# Patient Record
Sex: FEMALE | Race: WHITE | Employment: OTHER | URBAN - METROPOLITAN AREA
[De-identification: names, ages, dates, MRNs, and addresses within clinical notes are randomized per-mention and may not be internally consistent; named-entity substitution may affect disease eponyms.]

---

## 2017-06-23 PROBLEM — I89.0 LYMPHEDEMA OF BOTH LOWER EXTREMITIES: Status: ACTIVE | Noted: 2017-06-23

## 2017-07-10 ENCOUNTER — HOSPITAL ENCOUNTER (OUTPATIENT)
Dept: INFUSION THERAPY | Age: 82
Discharge: HOME | End: 2017-07-10
Attending: FAMILY MEDICINE | Admitting: FAMILY MEDICINE

## 2017-07-10 VITALS
WEIGHT: 193 LBS | HEART RATE: 68 BPM | HEIGHT: 65 IN | OXYGEN SATURATION: 97 % | DIASTOLIC BLOOD PRESSURE: 59 MMHG | RESPIRATION RATE: 18 BRPM | SYSTOLIC BLOOD PRESSURE: 140 MMHG | BODY MASS INDEX: 32.15 KG/M2 | TEMPERATURE: 98.5 F

## 2017-07-10 RX ORDER — ZOLEDRONIC ACID 5 MG/100ML
5 INJECTION, SOLUTION INTRAVENOUS ONCE
Status: COMPLETED | OUTPATIENT
Start: 2017-07-10 | End: 2017-07-10

## 2017-07-10 RX ORDER — SODIUM CHLORIDE 0.9 % (FLUSH) 0.9 %
10 SYRINGE (ML) INJECTION PRN
Status: DISCONTINUED | OUTPATIENT
Start: 2017-07-10 | End: 2017-07-11 | Stop reason: HOSPADM

## 2017-07-10 RX ADMIN — ZOLEDRONIC ACID 5 MG: 5 INJECTION, SOLUTION INTRAVENOUS at 13:30

## 2017-10-03 ENCOUNTER — HOSPITAL ENCOUNTER (OUTPATIENT)
Dept: WOMENS IMAGING | Age: 82
Discharge: HOME | End: 2017-10-03
Attending: FAMILY MEDICINE | Admitting: FAMILY MEDICINE

## 2017-10-03 DIAGNOSIS — M81.0 AGE-RELATED OSTEOPOROSIS WITHOUT CURRENT PATHOLOGICAL FRACTURE: ICD-10-CM

## 2017-10-03 DIAGNOSIS — M81.0 OSTEOPOROSIS, UNSPECIFIED OSTEOPOROSIS TYPE, UNSPECIFIED PATHOLOGICAL FRACTURE PRESENCE: ICD-10-CM

## 2017-10-03 DIAGNOSIS — Z12.31 SCREENING MAMMOGRAM, ENCOUNTER FOR: ICD-10-CM

## 2017-12-22 ENCOUNTER — HOSPITAL ENCOUNTER (OUTPATIENT)
Dept: PHYSICAL THERAPY | Age: 82
Discharge: HOME | End: 2017-12-31
Attending: PHYSICAL MEDICINE & REHABILITATION | Admitting: FAMILY MEDICINE

## 2017-12-22 NOTE — FLOWSHEET NOTE
Physical Therapy Daily Treatment Note  Date:  2017    Patient Name:  Jose Martin Diamond    :  1931  MRN: [id number]  Restrictions/Precautions:  none  Diagnosis: imbalance      Insurance/Certification information:  [facility] Medicare Aetna  Next Physician Visit:  Referring Physician:  Dr. Dina Nazario  Visit# / total visits:1  / 10                 Initial Pain level: 4 to 5/10 Back and left foot if she stands too long      Subjective:      Clinical Presentation: evolving:  Pt presents with decreased balance and unsteady gait that has resulted in several falls with fxs of her hips and ankle/ foot . Her malalignment of her left foot fracture greatly impairs her steadiness and gait. Bone is protruding from her foot. Custom shoe is uncomfortable. She also has sensory loss of her feet. Today she started with strengthening to her LE mm and balance exs. Any changes to Ambulatory Summary Sheet? Goals:     Long term goals  Time Frame for Long term goals : 60 days  Long term goal 1: no falls  Long term goal 2: indep with home program  Long term goal 3: improve steadiness of gait by 20%  Patient's goal:  Improve balance and no falls  Skilled PT activities: Date 17 Date Date Date     Outcome measure used          On visit#         Hip abd sitting  BLACK tband 2x10        LAQ        standing ankle sways front to back and side to side  10x, each , easily falls backwards, and substitutes by flexing her knees        Punches.  Reaching          Check custom shoes when she brings it in         Balance exs in bars                 asked to eat more healthy, ie protein breakfast and bedtime snack as she has episodes of lightheadedness advised        Nu step   Seat/arms 9  Level 2  6 min                                             Progress/goals assessment (every 10 visits) by  PT           HEP: Above exs      Objective   findings: See presentation      Provider interaction:        Response to

## 2018-01-01 ENCOUNTER — HOSPITAL ENCOUNTER (OUTPATIENT)
Dept: OTHER | Age: 83
Discharge: HOME | End: 2018-01-31
Attending: FAMILY MEDICINE | Admitting: FAMILY MEDICINE

## 2018-01-09 ENCOUNTER — HOSPITAL ENCOUNTER (OUTPATIENT)
Dept: PHYSICAL THERAPY | Age: 83
Discharge: HOME | End: 2018-01-09
Attending: PHYSICAL MEDICINE & REHABILITATION | Admitting: FAMILY MEDICINE

## 2018-01-09 NOTE — FLOWSHEET NOTE
Physical Therapy Daily Treatment Note  Date:  2018    Patient Name:  Mili Ornelas    :  1931  MRN: [id number]  Restrictions/Precautions:  none        Insurance/Certification information:  [facility] Medicare Aetna  Next Physician Visit:  Referring Physician:  Dr. Jinny Cordova  Visit# / total visits:  2  / 10                 Initial Pain level: 0/10     Subjective: Pt states the pain is not really her problem but her balance is. Clinical Presentation: evolving:  Pt presents with decreased balance and unsteady gait that has resulted in several falls with fxs of her hips and ankle/ foot . Her malalignment of her left foot fracture greatly impairs her steadiness and gait. Bone is protruding from her foot. Custom shoe is uncomfortable. She also has sensory loss of her feet. Today she started with strengthening to her LE mm and balance exs. Any changes to Ambulatory Summary Sheet? Goals:     Long term goals  Time Frame for Long term goals : 60 days  Long term goal 1: no falls  Long term goal 2: indep with home program  Long term goal 3: improve steadiness of gait by 20%  Patient's goal:  Improve balance and no falls  Skilled PT activities: Date 17 Date 18 #2 Date Date     Outcome measure used          On visit#         Hip abd sitting  BLACK tband 2x10 BLACK tband x 30 w /3 ct       LAQ  5 ct 2 x 10      standing ankle sways front to back and side to side  10x, each , easily falls backwards, and substitutes by flexing her knees X 10 with posterior LOB and compensation of flexing the knees while standing on blue foam       Punches.  Reaching   2 x 10 ea of cross punches at various levels and ball reach at several levels while standing on blue foam       Check custom shoes when she brings it in  Pt wearing custom stable shoes today       Balance exs in bars                 asked to eat more healthy, ie protein breakfast and bedtime snack as she has episodes of lightheadedness advised        Nu step   Seat/arms 9  Level 2  6 min Seat/arms 9  Level 2  12 min                                            Progress/goals assessment (every 10 visits) by  PT           HEP: Above exs New addition above with HO given     Objective   findings: See presentation Pt with several episodes of posterior LOB.   Some LOB to the R but nothing like posterior     Provider interaction:   Verbal and manual cueing on proper performance of the prescribed exercise or of the designated task     Response to intervention:   comfrtable, No pain     Plan for Next Session: Advance exs to her tolerance Advance exs to her tolerance       Modality/intervention used:  [x] Therapeutic Exercise  [] Modalities:  [] Therapeutic Activity     [] Ultrasound  [] Elec  Stim  [] Gait Training      [] Cervical Traction [] Lumbar Traction  [x] Neuromuscular Re-education    [] Cold/hotpack [] Iontophoresis   [x] Instruction in HEP      [] Vasopneumatic     [] Manual Therapy               [] Self care home management                    (    ) Dry needling    Post Tx Pain Ratin/10    Plan:(Fequency/duration/# of visits) 1 to 2 x a week for 10 visits  [x] Continue per plan of care [] Alter current plan   [] Plan of care initiated [] Hold pending MD visit [] Discharge         Time In: 3605  Time Out:1111  Timed Code Treatment Minutes: 46  Total Treatment Minutes:  46    Electronically signed by:  Blake Suárez 2018, 10:36 AM

## 2018-01-11 ENCOUNTER — HOSPITAL ENCOUNTER (OUTPATIENT)
Dept: PHYSICAL THERAPY | Age: 83
Discharge: HOME | End: 2018-01-11
Attending: PHYSICAL MEDICINE & REHABILITATION | Admitting: FAMILY MEDICINE

## 2018-01-11 NOTE — FLOWSHEET NOTE
Physical Therapy Daily Treatment Note  Date:  2018    Patient Name:  Clarissa Tipton    :  1931  MRN: [id number]  Restrictions/Precautions:  none        Insurance/Certification information:  [facility] Medicare Aetna  Next Physician Visit:  Referring Physician:  Dr. Kami Ching  Visit# / total visits: 3  / 10                 Initial Pain level: 0/10  Low back hurts     Subjective: Pt states  She has  More swelling with her legs and uses home use home massager--   Clinical Presentation: evolving:  Pt presents with decreased balance and unsteady gait that has resulted in several falls with fxs of her hips and ankle/ foot . Her malalignment of her left foot fracture greatly impairs her steadiness and gait. Bone is protruding from her foot. Custom shoe is uncomfortable. She also has sensory loss of her feet. Today she started with strengthening to her LE mm and balance exs. Any changes to Ambulatory Summary Sheet? Goals:     Long term goals  Time Frame for Long term goals : 60 days  Long term goal 1: no falls  Long term goal 2: indep with home program  Long term goal 3: improve steadiness of gait by 20%  Patient's goal:  Improve balance and no falls         Prefers the name : Gladis Jenkins PT activities: Date 17 Date 18 #2 Date  18 #3 Date     Outcome measure used          On visit#         Hip abd sitting  BLACK tband 2x10 BLACK tband x 30 w /3 ct Black 30x      LAQ  5 ct 2 x 10      standing ankle sways front to back and side to side  10x, each , easily falls backwards, and substitutes by flexing her knees X 10 with posterior LOB and compensation of flexing the knees while standing on blue foam Side to side 10x    Front to back 10x      Punches.  Reaching   2 x 10 ea of cross punches at various levels and ball reach at several levels while standing on blue foam Reach forward 5x , loses balance     Punches 20x loses balance 7 x       Check custom shoes when she

## 2018-01-18 ENCOUNTER — HOSPITAL ENCOUNTER (OUTPATIENT)
Dept: PHYSICAL THERAPY | Age: 83
Discharge: HOME | End: 2018-01-18
Attending: PHYSICAL MEDICINE & REHABILITATION | Admitting: FAMILY MEDICINE

## 2018-01-23 ENCOUNTER — HOSPITAL ENCOUNTER (OUTPATIENT)
Dept: PHYSICAL THERAPY | Age: 83
Discharge: HOME | End: 2018-01-23
Attending: PHYSICAL MEDICINE & REHABILITATION | Admitting: FAMILY MEDICINE

## 2018-01-23 NOTE — FLOWSHEET NOTE
Reach forward 5x , loses balance     Punches 20x loses balance 7 x  Reach forward 5x , loses balance     Punches 20x    Reach forward touching ball 5x , loses balance     Punches 20x     Check custom shoes when she brings it in  Pt wearing custom stable shoes today Wearing custom shoes, good shoes,  But pt does not like then        Balance exs in bars        Arm raises standing    10 x , easily loses balance 3 x.  x 10 reps Holding ball, arm raises x 10 reps, cw and ccw x 10 reps each   Sitting heel raises    10x sitting, unable to do this standing  10x sitting, unable to do this standing  x 10 reps sitting    toe raises holding on to bar    10x holding on bar 10x holding on bar 10x holding on bar    asked to eat more healthy, ie protein breakfast and bedtime snack as she has episodes of lightheadedness advised  No lightheadness since was medication was changed        Nu step   Seat/arms 9  Level 2  6 min Seat/arms 9  Level 2  12 min Seat/ 10  Arms 11   level 3   20  min Seat/ 10  Arms 11   level 3   15  min Seat/ 10  Arms 11   level 3   15  min          Sidestepping in parallel without UE support x 2 laps          Ball activities in parallel bars, standing tossing R<>L hand, bouncing ball R<>L, intermittent LOB. Attempted tossing ball up with 1 clap - pt unable Ball activities in parallel bars, standing tossing R<>L hand, bouncing ball, toss/catching with 1 clap and combo of bouncing and then toss with 1 clap. Progress/goals assessment (every 10 visits) by  PT            HEP: Above exs New addition above with HO given Added 4 new exs and advised pt to hold on or be close to counter so she can regain her balance. Continue with HEP as instructed continue   Objective   findings: See presentation Pt with several episodes of posterior LOB.   Some LOB to the R but nothing like posterior Increased swelling of legs, and advised to lie down 20  Min with legs elevated    Intermittent LOB with balance activities Pt loses balance easily posteriorly   Provider interaction:   Verbal and manual cueing on proper performance of the prescribed exercise or of the designated task Teaching, cues and close guard for balance exs  Monitoring to ensure safe and effective activity performance Monitoring to ensure safe and effective activity performance   Response to intervention:   comfrtable, No pain  No pain, min fatigue No pain, min fatigue   Plan for Next Session: Advance exs to her tolerance Advance exs to her tolerance Advance exs  Cont. Progress as tolerated Cont.  Progress as tolerated     Modality/intervention used:  [x] Therapeutic Exercise  [] Modalities:  [] Therapeutic Activity     [] Ultrasound  [] Elec  Stim  [] Gait Training      [] Cervical Traction [] Lumbar Traction  [x] Neuromuscular Re-education    [] Cold/hotpack [] Iontophoresis   [x] Instruction in HEP      [] Vasopneumatic     [] Manual Therapy               [] Self care home management                    (    ) Dry needling    Post Tx Pain Ratin/10    Plan:(Fequency/duration/# of visits) 1 to 2 x a week for 10 visits  [x] Continue per plan of care [] Alter current plan   [] Plan of care initiated [] Hold pending MD visit [] Discharge         Time In: 6422  Time Out:1200  Timed Code Treatment Minutes:  45  Total Treatment Minutes:  45    Electronically signed by:  Val Pinto 2018, 11:15 AM

## 2018-01-25 ENCOUNTER — HOSPITAL ENCOUNTER (OUTPATIENT)
Dept: PHYSICAL THERAPY | Age: 83
Discharge: HOME | End: 2018-01-25
Attending: PHYSICAL MEDICINE & REHABILITATION | Admitting: FAMILY MEDICINE

## 2018-01-25 NOTE — FLOWSHEET NOTE
Physical Therapy Daily Treatment Note  Date:  2018    Patient Name:  Rut Arzola by Violeta Yee    :  1931  MRN: [id number]  Restrictions/Precautions:  none        Insurance/Certification information:  [facility] Medicare Aetna  Next Physician Visit:  Referring Physician:  Dr. Ree Motley  Visit# / total visits: 6/ 10                 Initial Pain level: 0/10  Low back     Subjective: Pt states she is tired today. Clinical Presentation: evolving:  Pt presents with decreased balance and unsteady gait that has resulted in several falls with fxs of her hips and ankle/ foot . Her malalignment of her left foot fracture greatly impairs her steadiness and gait. Bone is protruding from her foot. Custom shoe is uncomfortable. She also has sensory loss of her feet. Today she started with strengthening to her LE mm and balance exs. Any changes to Ambulatory Summary Sheet?              Goals:     Long term goals  Time Frame for Long term goals : 60 days  Long term goal 1: no falls  Long term goal 2: indep with home program  Long term goal 3: improve steadiness of gait by 20%  Patient's goal:  Improve balance and no falls         Prefers the name : Bessie Rivera PT activities: Date 17 Date 18 #2 Date  18 #3 Date 18 (4) Date 18 (5) 18 #6     Outcome measure used          On visit#      No foot sensation and old left ankle fx     Hip abd sitting  BLACK tband 2x10 BLACK tband x 30 w /3 ct Black 30x Black 30x Black 30x 30x black     LAQ  5 ct 2 x 10  2x10 reps 2x10 R/L     standing ankle sways front to back and side to side  10x, each , easily falls backwards, and substitutes by flexing her knees X 10 with posterior LOB and compensation of flexing the knees while standing on blue foam Side to side 10x    Front to back 10x Side to side 10x    Front to back 10x Side to side 10x    Front to back 10x 10x eyes open   10x eyes closed with tactile cues  For both motions, very then toss with 1 clap. Ball tossing sitting 20x  Ball tossing   Standing 20x    Bouncing to PT while standing in bars. Did well 20x                            Progress/goals assessment (every 10 visits) by  PT             HEP: Above exs New addition above with HO given Added 4 new exs and advised pt to hold on or be close to counter so she can regain her balance. Continue with HEP as instructed continue Continue    Objective   findings: See presentation Pt with several episodes of posterior LOB. Some LOB to the R but nothing like posterior Increased swelling of legs, and advised to lie down 20  Min with legs elevated    Intermittent LOB with balance activities Pt loses balance easily posteriorly Pt has improved with doing more difficult balance exs    Provider interaction:   Verbal and manual cueing on proper performance of the prescribed exercise or of the designated task Teaching, cues and close guard for balance exs  Monitoring to ensure safe and effective activity performance Monitoring to ensure safe and effective activity performance Close guard for safety , and needs many cues   Response to intervention:   comfrtable, No pain  No pain, min fatigue No pain, min fatigue No pain min fatigue   Plan for Next Session: Advance exs to her tolerance Advance exs to her tolerance Advance exs  Cont. Progress as tolerated Cont.  Progress as tolerated Continue to progress     Modality/intervention used:  [x] Therapeutic Exercise  [] Modalities:  [] Therapeutic Activity     [] Ultrasound  [] Elec  Stim  [] Gait Training      [] Cervical Traction [] Lumbar Traction  [x] Neuromuscular Re-education    [] Cold/hotpack [] Iontophoresis   [x] Instruction in HEP      [] Vasopneumatic     [] Manual Therapy               [] Self care home management                    (    ) Dry needling    Post Tx Pain Ratin/10    Plan:(Fequency/duration/# of visits) 1 to 2 x a week for 10 visits  [x] Continue per plan of care [] Alter

## 2018-02-01 ENCOUNTER — HOSPITAL ENCOUNTER (OUTPATIENT)
Dept: OTHER | Age: 83
Discharge: HOME | End: 2018-02-23
Attending: FAMILY MEDICINE | Admitting: FAMILY MEDICINE

## 2018-02-15 ENCOUNTER — HOSPITAL ENCOUNTER (OUTPATIENT)
Dept: PHYSICAL THERAPY | Age: 83
Discharge: HOME | End: 2018-02-15
Attending: PHYSICAL MEDICINE & REHABILITATION | Admitting: FAMILY MEDICINE

## 2018-02-15 NOTE — FLOWSHEET NOTE
ensure safe and effective activity performance Monitoring to ensure safe and effective activity performance Close guard for safety , and needs many cues     Response to intervention:   comfrtable, No pain  No pain, min fatigue No pain, min fatigue No pain min fatigue No pain minimal fatigue Minimal increase in pain with each foot   Plan for Next Session: Advance exs to her tolerance Advance exs to her tolerance Advance exs  Cont. Progress as tolerated Cont.  Progress as tolerated Continue to progress Continue to progress Continue 2  More visits     Modality/intervention used:  [x] Therapeutic Exercise  [] Modalities:  [] Therapeutic Activity     [] Ultrasound  [] Elec  Stim  [] Gait Training      [] Cervical Traction [] Lumbar Traction  [x] Neuromuscular Re-education    [] Cold/hotpack [] Iontophoresis   [x] Instruction in HEP      [] Vasopneumatic     [] Manual Therapy               [] Self care home management                    (    ) Dry needling    Post Tx Pain Ratin/10    Plan:(Fequency/duration/# of visits) 1 to 2 x a week for 10 visits  [x] Continue per plan of care [] Alter current plan   [] Plan of care initiated [] Hold pending MD visit [] Discharge         Time In:   8306  Time Out: 1130Timed Code Treatment Minutes: 45 Total Treatment Minutes:   45  Electronically signed by:  Colt Tamez 2/15/2018, 11:02 AM

## 2018-02-22 ENCOUNTER — HOSPITAL ENCOUNTER (OUTPATIENT)
Dept: PHYSICAL THERAPY | Age: 83
Discharge: HOME | End: 2018-02-22
Attending: PHYSICAL MEDICINE & REHABILITATION | Admitting: FAMILY MEDICINE

## 2018-02-22 NOTE — FLOWSHEET NOTE
loses balance 3 x.  x 10 reps Holding ball, arm raises x 10 reps, cw and ccw x 10 reps each 10x arm raises   10x CW and CCW , pt easily loses balance and and forgets how to do exs      Sitting heel raises    10x sitting, unable to do this standing  10x sitting, unable to do this standing  x 10 reps sitting 10x each  10x  Toe raises 10x  10x   10x  10x   10x     toe raises holding on to bar    10x holding on bar 10x holding on bar 10x holding on bar   10x      asked to eat more healthy, ie protein breakfast and bedtime snack as she has episodes of lightheadedness advised  No lightheadness since was medication was changed    compliant        Nu step   Seat/arms 9  Level 2  6 min Seat/arms 9  Level 2  12 min Seat/ 10  Arms 11   level 3   20  min Seat/ 10  Arms 11   level 3   15  min Seat/ 10  Arms 11   level 3   15  min Seat/ 10  Arms 11   level 3   15  min     Seats 10   Arms  11   Level 3  13 min   Seat 11   Arms 11  Level 2  12 min           Sidestepping in parallel without UE support x 2 laps  2 laps 2 laps not holding on  2 laps not holding on          Ball activities in parallel bars, standing tossing R<>L hand, bouncing ball R<>L, intermittent LOB. Attempted tossing ball up with 1 clap - pt unable Ball activities in parallel bars, standing tossing R<>L hand, bouncing ball, toss/catching with 1 clap and combo of bouncing and then toss with 1 clap. Ball tossing sitting 20x  Ball tossing   Standing 20x    Bouncing to PT while standing in bars. Did well 20x  20x     10x  Step over and sideways over rolled pillowcase 5 x each , needs to lightly hold on when stepping forward, better balance on right foot                                  Progress/goals assessment (every 10 visits) by  PT                HEP: Above exs New addition above with HO given Added 4 new exs and advised pt to hold on or be close to counter so she can regain her balance.   Continue with HEP as instructed continue Continue  Continue Objective   findings: See presentation Pt with several episodes of posterior LOB. Some LOB to the R but nothing like posterior Increased swelling of legs, and advised to lie down 20  Min with legs elevated    Intermittent LOB with balance activities Pt loses balance easily posteriorly Pt has improved with doing more difficult balance exs  Pt continues to impve with balance   Walking is more painful due to collapse of joints in feet   Unable to do wt bearing exs    Provider interaction:   Verbal and manual cueing on proper performance of the prescribed exercise or of the designated task Teaching, cues and close guard for balance exs  Monitoring to ensure safe and effective activity performance Monitoring to ensure safe and effective activity performance Close guard for safety , and needs many cues   Pt is indep with home exs sitting    Response to intervention:   comfrtable, No pain  No pain, min fatigue No pain, min fatigue No pain min fatigue No pain minimal fatigue Minimal increase in pain with each foot    Plan for Next Session: Advance exs to her tolerance Advance exs to her tolerance Advance exs  Cont. Progress as tolerated Cont.  Progress as tolerated Continue to progress Continue to progress Continue 2  More visits Pt DC      Modality/intervention used:  [x] Therapeutic Exercise  [] Modalities:  [] Therapeutic Activity     [] Ultrasound  [] Elec  Stim  [] Gait Training      [] Cervical Traction [] Lumbar Traction  [x] Neuromuscular Re-education    [] Cold/hotpack [] Iontophoresis   [x] Instruction in HEP      [] Vasopneumatic     [] Manual Therapy               [] Self care home management                    (    ) Dry needling    Post Tx Pain Ratin/10    Plan:(Fequency/duration/# of visits) 1 to 2 x a week for 10 visits  [] Continue per plan of care [] Alter current plan   [] Plan of care initiated [] Hold pending MD visit [x] Discharge         Time In:   1300  Time Out: 1330Timed Code Treatment

## 2018-07-18 ENCOUNTER — HOSPITAL ENCOUNTER (OUTPATIENT)
Dept: ULTRASOUND IMAGING | Age: 83
Discharge: HOME | End: 2018-07-18
Attending: PODIATRIST | Admitting: PODIATRIST

## 2018-07-18 DIAGNOSIS — I82.409 ACUTE EMBOLISM AND THROMBOSIS OF DEEP VEIN OF LOWER EXTREMITY (HCC): ICD-10-CM

## 2018-07-18 DIAGNOSIS — I82.4Y1 DEEP VEIN THROMBOSIS (DVT) OF PROXIMAL VEIN OF RIGHT LOWER EXTREMITY, UNSPECIFIED CHRONICITY (HCC): ICD-10-CM

## 2018-09-01 ENCOUNTER — HOSPITAL ENCOUNTER (OUTPATIENT)
Dept: OTHER | Age: 83
Discharge: HOME | End: 2018-09-01
Attending: INTERNAL MEDICINE | Admitting: INTERNAL MEDICINE

## 2018-09-02 PROBLEM — S82.001A CLOSED DISPLACED FRACTURE OF RIGHT PATELLA: Status: ACTIVE | Noted: 2018-09-02

## 2018-09-02 PROBLEM — S82.041A DISPLACED COMMINUTED FRACTURE OF RIGHT PATELLA, INITIAL ENCOUNTER FOR CLOSED FRACTURE: Status: ACTIVE | Noted: 2018-09-02
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
## 2018-09-07 LAB
INR BLD: 1.49 INDEX
PROTHROMBIN TIME: 16.9 SECONDS (ref 9.12–12.5)

## 2018-09-11 LAB
ANION GAP SERPL CALCULATED.3IONS-SCNC: 15 MMOL/L (ref 4–16)
BUN BLDV-MCNC: 36 MG/DL (ref 6–23)
CALCIUM SERPL-MCNC: 9.2 MG/DL (ref 8.3–10.6)
CHLORIDE BLD-SCNC: 103 MMOL/L (ref 99–110)
CO2: 29 MMOL/L (ref 21–32)
CREAT SERPL-MCNC: 1 MG/DL (ref 0.6–1.1)
GFR AFRICAN AMERICAN: >60 ML/MIN/1.73M2
GFR NON-AFRICAN AMERICAN: 52 ML/MIN/1.73M2
GLUCOSE BLD-MCNC: 98 MG/DL (ref 70–99)
HCT VFR BLD CALC: 41.7 % (ref 37–47)
HEMOGLOBIN: 12.4 GM/DL (ref 12.5–16)
INR BLD: 2.25 INDEX
MCH RBC QN AUTO: 28.7 PG (ref 27–31)
MCHC RBC AUTO-ENTMCNC: 29.7 % (ref 32–36)
MCV RBC AUTO: 96.5 FL (ref 78–100)
PDW BLD-RTO: 14.5 % (ref 11.7–14.9)
PLATELET # BLD: 302 K/CU MM (ref 140–440)
PMV BLD AUTO: 11.2 FL (ref 7.5–11.1)
POTASSIUM SERPL-SCNC: 3.5 MMOL/L (ref 3.5–5.1)
PROTHROMBIN TIME: 25.5 SECONDS (ref 9.12–12.5)
RBC # BLD: 4.32 M/CU MM (ref 4.2–5.4)
SODIUM BLD-SCNC: 147 MMOL/L (ref 135–145)
WBC # BLD: 9.4 K/CU MM (ref 4–10.5)

## 2018-09-14 LAB
INR BLD: 3.7 INDEX
PROTHROMBIN TIME: 41.6 SECONDS (ref 9.12–12.5)

## 2018-09-18 LAB
INR BLD: 2.58 INDEX
PROTHROMBIN TIME: 29.1 SECONDS (ref 9.12–12.5)

## 2018-09-21 LAB
INR BLD: 2.16 INDEX
PROTHROMBIN TIME: 24.4 SECONDS (ref 9.12–12.5)

## 2018-09-25 ENCOUNTER — HOSPITAL ENCOUNTER (OUTPATIENT)
Age: 83
Discharge: HOME | End: 2018-09-25
Payer: MEDICARE

## 2018-09-25 LAB
INR BLD: 1.19 INDEX
PROTHROMBIN TIME: 13.5 SECONDS (ref 9.12–12.5)

## 2018-09-25 PROCEDURE — 85610 PROTHROMBIN TIME: CPT

## 2018-09-25 PROCEDURE — 36415 COLL VENOUS BLD VENIPUNCTURE: CPT

## 2018-09-28 ENCOUNTER — HOSPITAL ENCOUNTER (OUTPATIENT)
Age: 83
Setting detail: SPECIMEN
Discharge: HOME | End: 2018-09-28

## 2018-09-28 LAB
INR BLD: 1.2 INDEX
PROTHROMBIN TIME: 13.7 SECONDS (ref 9.12–12.5)

## 2018-09-28 PROCEDURE — 85610 PROTHROMBIN TIME: CPT

## 2018-09-28 PROCEDURE — 36415 COLL VENOUS BLD VENIPUNCTURE: CPT

## 2018-10-02 ENCOUNTER — HOSPITAL ENCOUNTER (OUTPATIENT)
Age: 83
Discharge: HOME | End: 2018-10-02

## 2018-10-02 LAB
ANION GAP SERPL CALCULATED.3IONS-SCNC: 11 MMOL/L (ref 4–16)
BUN BLDV-MCNC: 19 MG/DL (ref 6–23)
CALCIUM SERPL-MCNC: 8.8 MG/DL (ref 8.3–10.6)
CHLORIDE BLD-SCNC: 106 MMOL/L (ref 99–110)
CO2: 29 MMOL/L (ref 21–32)
CREAT SERPL-MCNC: 0.9 MG/DL (ref 0.6–1.1)
GFR AFRICAN AMERICAN: >60 ML/MIN/1.73M2
GFR NON-AFRICAN AMERICAN: 59 ML/MIN/1.73M2
GLUCOSE BLD-MCNC: 96 MG/DL (ref 70–99)
HCT VFR BLD CALC: 35.8 % (ref 37–47)
HEMOGLOBIN: 11 GM/DL (ref 12.5–16)
INR BLD: 1.22 INDEX
MCH RBC QN AUTO: 29.6 PG (ref 27–31)
MCHC RBC AUTO-ENTMCNC: 30.7 % (ref 32–36)
MCV RBC AUTO: 96.5 FL (ref 78–100)
PDW BLD-RTO: 14.9 % (ref 11.7–14.9)
PLATELET # BLD: 214 K/CU MM (ref 140–440)
PMV BLD AUTO: 11 FL (ref 7.5–11.1)
POTASSIUM SERPL-SCNC: 4.1 MMOL/L (ref 3.5–5.1)
PROTHROMBIN TIME: 13.9 SECONDS (ref 9.12–12.5)
RBC # BLD: 3.71 M/CU MM (ref 4.2–5.4)
SODIUM BLD-SCNC: 146 MMOL/L (ref 135–145)
WBC # BLD: 7.1 K/CU MM (ref 4–10.5)

## 2018-10-02 PROCEDURE — 85610 PROTHROMBIN TIME: CPT

## 2018-10-02 PROCEDURE — 85027 COMPLETE CBC AUTOMATED: CPT

## 2018-10-02 PROCEDURE — 80048 BASIC METABOLIC PNL TOTAL CA: CPT

## 2018-10-02 PROCEDURE — 36415 COLL VENOUS BLD VENIPUNCTURE: CPT

## 2018-10-05 ENCOUNTER — HOSPITAL ENCOUNTER (OUTPATIENT)
Age: 83
Setting detail: SPECIMEN
Discharge: HOME | End: 2018-10-05

## 2018-10-05 LAB
INR BLD: 1.38 INDEX
PROTHROMBIN TIME: 15.9 SECONDS (ref 9.12–12.5)

## 2018-10-05 PROCEDURE — 85610 PROTHROMBIN TIME: CPT

## 2018-10-05 PROCEDURE — 36415 COLL VENOUS BLD VENIPUNCTURE: CPT

## 2018-10-09 ENCOUNTER — HOSPITAL ENCOUNTER (OUTPATIENT)
Age: 83
Setting detail: SPECIMEN
Discharge: HOME | End: 2018-10-09

## 2018-10-09 LAB
INR BLD: 2.01 INDEX
PROTHROMBIN TIME: 22.8 SECONDS (ref 9.12–12.5)

## 2018-10-09 PROCEDURE — 36415 COLL VENOUS BLD VENIPUNCTURE: CPT

## 2018-10-09 PROCEDURE — 85610 PROTHROMBIN TIME: CPT

## 2018-10-12 ENCOUNTER — HOSPITAL ENCOUNTER (OUTPATIENT)
Age: 83
Setting detail: SPECIMEN
Discharge: HOME | End: 2018-10-12

## 2018-10-12 LAB
ANION GAP SERPL CALCULATED.3IONS-SCNC: 15 MMOL/L (ref 4–16)
BUN BLDV-MCNC: 44 MG/DL (ref 6–23)
CALCIUM SERPL-MCNC: 9.2 MG/DL (ref 8.3–10.6)
CHLORIDE BLD-SCNC: 97 MMOL/L (ref 99–110)
CO2: 31 MMOL/L (ref 21–32)
CREAT SERPL-MCNC: 1.2 MG/DL (ref 0.6–1.1)
GFR AFRICAN AMERICAN: 51 ML/MIN/1.73M2
GFR NON-AFRICAN AMERICAN: 42 ML/MIN/1.73M2
GLUCOSE BLD-MCNC: 99 MG/DL (ref 70–99)
INR BLD: 2.72 INDEX
POTASSIUM SERPL-SCNC: 3.5 MMOL/L (ref 3.5–5.1)
PROTHROMBIN TIME: 30.7 SECONDS (ref 9.12–12.5)
SODIUM BLD-SCNC: 143 MMOL/L (ref 135–145)

## 2018-10-12 PROCEDURE — 85610 PROTHROMBIN TIME: CPT

## 2018-10-12 PROCEDURE — 36415 COLL VENOUS BLD VENIPUNCTURE: CPT

## 2018-10-12 PROCEDURE — 80048 BASIC METABOLIC PNL TOTAL CA: CPT

## 2018-10-16 ENCOUNTER — HOSPITAL ENCOUNTER (OUTPATIENT)
Age: 83
Setting detail: SPECIMEN
Discharge: HOME | End: 2018-10-16

## 2018-10-16 LAB
ANION GAP SERPL CALCULATED.3IONS-SCNC: 14 MMOL/L (ref 4–16)
BUN BLDV-MCNC: 41 MG/DL (ref 6–23)
CALCIUM SERPL-MCNC: 9.3 MG/DL (ref 8.3–10.6)
CHLORIDE BLD-SCNC: 95 MMOL/L (ref 99–110)
CO2: 35 MMOL/L (ref 21–32)
CREAT SERPL-MCNC: 1.2 MG/DL (ref 0.6–1.1)
GFR AFRICAN AMERICAN: 51 ML/MIN/1.73M2
GFR NON-AFRICAN AMERICAN: 42 ML/MIN/1.73M2
GLUCOSE BLD-MCNC: 115 MG/DL (ref 70–99)
INR BLD: 3.03 INDEX
POTASSIUM SERPL-SCNC: 3.3 MMOL/L (ref 3.5–5.1)
PROTHROMBIN TIME: 34.2 SECONDS (ref 9.12–12.5)
SODIUM BLD-SCNC: 144 MMOL/L (ref 135–145)

## 2018-10-16 PROCEDURE — 36415 COLL VENOUS BLD VENIPUNCTURE: CPT

## 2018-10-16 PROCEDURE — 85610 PROTHROMBIN TIME: CPT

## 2018-10-16 PROCEDURE — 80048 BASIC METABOLIC PNL TOTAL CA: CPT

## 2018-10-19 ENCOUNTER — HOSPITAL ENCOUNTER (OUTPATIENT)
Age: 83
Setting detail: SPECIMEN
Discharge: HOME | End: 2018-10-19

## 2018-10-19 LAB
INR BLD: 2.57 INDEX
PROTHROMBIN TIME: 29 SECONDS (ref 9.12–12.5)

## 2018-10-19 PROCEDURE — 85610 PROTHROMBIN TIME: CPT

## 2018-10-19 PROCEDURE — 36415 COLL VENOUS BLD VENIPUNCTURE: CPT

## 2018-10-23 ENCOUNTER — HOSPITAL ENCOUNTER (OUTPATIENT)
Age: 83
Setting detail: SPECIMEN
Discharge: HOME | End: 2018-10-23

## 2018-10-23 LAB
ANION GAP SERPL CALCULATED.3IONS-SCNC: 14 MMOL/L (ref 4–16)
BUN BLDV-MCNC: 34 MG/DL (ref 6–23)
CALCIUM SERPL-MCNC: 9.3 MG/DL (ref 8.3–10.6)
CHLORIDE BLD-SCNC: 99 MMOL/L (ref 99–110)
CO2: 32 MMOL/L (ref 21–32)
CREAT SERPL-MCNC: 1.1 MG/DL (ref 0.6–1.1)
GFR AFRICAN AMERICAN: 57 ML/MIN/1.73M2
GFR NON-AFRICAN AMERICAN: 47 ML/MIN/1.73M2
GLUCOSE BLD-MCNC: 106 MG/DL (ref 70–99)
INR BLD: 2.52 INDEX
POTASSIUM SERPL-SCNC: 3.3 MMOL/L (ref 3.5–5.1)
PROTHROMBIN TIME: 28.5 SECONDS (ref 9.12–12.5)
SODIUM BLD-SCNC: 145 MMOL/L (ref 135–145)

## 2018-10-23 PROCEDURE — 85610 PROTHROMBIN TIME: CPT

## 2018-10-23 PROCEDURE — 36415 COLL VENOUS BLD VENIPUNCTURE: CPT

## 2018-10-23 PROCEDURE — 80048 BASIC METABOLIC PNL TOTAL CA: CPT

## 2018-10-26 ENCOUNTER — APPOINTMENT (OUTPATIENT)
Dept: GENERAL RADIOLOGY | Age: 83
End: 2018-10-26
Payer: MEDICARE

## 2018-10-26 ENCOUNTER — APPOINTMENT (OUTPATIENT)
Dept: CT IMAGING | Age: 83
End: 2018-10-26
Payer: MEDICARE

## 2018-10-26 ENCOUNTER — HOSPITAL ENCOUNTER (EMERGENCY)
Age: 83
Discharge: HOME | End: 2018-10-26
Attending: EMERGENCY MEDICINE
Payer: MEDICARE

## 2018-10-26 VITALS
HEART RATE: 66 BPM | TEMPERATURE: 97.7 F | HEIGHT: 65 IN | SYSTOLIC BLOOD PRESSURE: 141 MMHG | OXYGEN SATURATION: 98 % | DIASTOLIC BLOOD PRESSURE: 56 MMHG | BODY MASS INDEX: 32.49 KG/M2 | WEIGHT: 195 LBS | RESPIRATION RATE: 11 BRPM

## 2018-10-26 DIAGNOSIS — M79.605 LEFT LEG PAIN: ICD-10-CM

## 2018-10-26 DIAGNOSIS — R29.6 FREQUENT FALLS: Primary | ICD-10-CM

## 2018-10-26 LAB
ALBUMIN SERPL-MCNC: 4.3 GM/DL (ref 3.4–5)
ALP BLD-CCNC: 83 IU/L (ref 40–129)
ALT SERPL-CCNC: 12 U/L (ref 10–40)
ANION GAP SERPL CALCULATED.3IONS-SCNC: 14 MMOL/L (ref 4–16)
AST SERPL-CCNC: 26 IU/L (ref 15–37)
BASOPHILS ABSOLUTE: 0 K/CU MM
BASOPHILS RELATIVE PERCENT: 0.3 % (ref 0–1)
BILIRUB SERPL-MCNC: 0.7 MG/DL (ref 0–1)
BUN BLDV-MCNC: 39 MG/DL (ref 6–23)
CALCIUM SERPL-MCNC: 9.5 MG/DL (ref 8.3–10.6)
CHLORIDE BLD-SCNC: 97 MMOL/L (ref 99–110)
CO2: 30 MMOL/L (ref 21–32)
CREAT SERPL-MCNC: 1.3 MG/DL (ref 0.6–1.1)
DIFFERENTIAL TYPE: ABNORMAL
EOSINOPHILS ABSOLUTE: 0.1 K/CU MM
EOSINOPHILS RELATIVE PERCENT: 1 % (ref 0–3)
GFR AFRICAN AMERICAN: 47 ML/MIN/1.73M2
GFR NON-AFRICAN AMERICAN: 39 ML/MIN/1.73M2
GLUCOSE BLD-MCNC: 94 MG/DL (ref 70–99)
HCT VFR BLD CALC: 36.8 % (ref 37–47)
HEMOGLOBIN: 11.5 GM/DL (ref 12.5–16)
IMMATURE NEUTROPHIL %: 0.3 % (ref 0–0.43)
INR BLD: 2.14 INDEX
LYMPHOCYTES ABSOLUTE: 2.7 K/CU MM
LYMPHOCYTES RELATIVE PERCENT: 23 % (ref 24–44)
MCH RBC QN AUTO: 29.9 PG (ref 27–31)
MCHC RBC AUTO-ENTMCNC: 31.3 % (ref 32–36)
MCV RBC AUTO: 95.8 FL (ref 78–100)
MONOCYTES ABSOLUTE: 1.2 K/CU MM
MONOCYTES RELATIVE PERCENT: 10.5 % (ref 0–4)
NUCLEATED RBC %: 0 %
PDW BLD-RTO: 14.6 % (ref 11.7–14.9)
PLATELET # BLD: 193 K/CU MM (ref 140–440)
PMV BLD AUTO: 11.1 FL (ref 7.5–11.1)
POTASSIUM SERPL-SCNC: 3.5 MMOL/L (ref 3.5–5.1)
PROTHROMBIN TIME: 24.2 SECONDS (ref 9.12–12.5)
RBC # BLD: 3.84 M/CU MM (ref 4.2–5.4)
SEGMENTED NEUTROPHILS ABSOLUTE COUNT: 7.6 K/CU MM
SEGMENTED NEUTROPHILS RELATIVE PERCENT: 64.9 % (ref 36–66)
SODIUM BLD-SCNC: 141 MMOL/L (ref 135–145)
TOTAL IMMATURE NEUTOROPHIL: 0.04 K/CU MM
TOTAL NUCLEATED RBC: 0 K/CU MM
TOTAL PROTEIN: 7.1 GM/DL (ref 6.4–8.2)
WBC # BLD: 11.7 K/CU MM (ref 4–10.5)

## 2018-10-26 PROCEDURE — 36415 COLL VENOUS BLD VENIPUNCTURE: CPT

## 2018-10-26 PROCEDURE — 93005 ELECTROCARDIOGRAM TRACING: CPT | Performed by: EMERGENCY MEDICINE

## 2018-10-26 PROCEDURE — 70450 CT HEAD/BRAIN W/O DYE: CPT

## 2018-10-26 PROCEDURE — 99284 EMERGENCY DEPT VISIT MOD MDM: CPT

## 2018-10-26 PROCEDURE — 73560 X-RAY EXAM OF KNEE 1 OR 2: CPT

## 2018-10-26 PROCEDURE — 80053 COMPREHEN METABOLIC PANEL: CPT

## 2018-10-26 PROCEDURE — 73610 X-RAY EXAM OF ANKLE: CPT

## 2018-10-26 PROCEDURE — 73590 X-RAY EXAM OF LOWER LEG: CPT

## 2018-10-26 PROCEDURE — 96374 THER/PROPH/DIAG INJ IV PUSH: CPT

## 2018-10-26 PROCEDURE — 85025 COMPLETE CBC W/AUTO DIFF WBC: CPT

## 2018-10-26 PROCEDURE — 85610 PROTHROMBIN TIME: CPT

## 2018-10-26 ASSESSMENT — PAIN DESCRIPTION - ORIENTATION
ORIENTATION: LEFT
ORIENTATION: LEFT

## 2018-10-26 ASSESSMENT — PAIN DESCRIPTION - DESCRIPTORS: DESCRIPTORS: ACHING

## 2018-10-26 ASSESSMENT — PAIN DESCRIPTION - LOCATION
LOCATION: HEAD;LEG
LOCATION: LEG

## 2018-10-26 ASSESSMENT — PAIN DESCRIPTION - PAIN TYPE: TYPE: ACUTE PAIN

## 2018-10-26 ASSESSMENT — PAIN SCALES - GENERAL: PAINLEVEL_OUTOF10: 6

## 2018-10-26 NOTE — ED NOTES
CT Head WO Contrast   Status: Preliminary result   Order Providers     Alka Ave, MD          [facility] Radiologists     Read Date Phone Pager   Babita Jimenez Oct 26, 2018 [phone]    Radiation Dose Estimates     No radiation information found for this patient   Narrative   EXAMINATION:   CT OF THE HEAD WITHOUT CONTRAST  10/26/2018 2:46 pm       TECHNIQUE:   CT of the head was performed without the administration of intravenous   contrast. Dose modulation, iterative reconstruction, and/or weight based   adjustment of the mA/kV was utilized to reduce the radiation dose to as low   as reasonably achievable.       COMPARISON:   September 2, 2018       HISTORY:   ORDERING SYSTEM PROVIDED HISTORY: HEAD TRAUMA, CLOSED, MILD, GCS >= 13, NO   RISK FACTORS, NEURO EXAM NORMAL   TECHNOLOGIST PROVIDED HISTORY:   Has a \"code stroke\" or \"stroke alert\" been called? ->No   Ordering Physician Provided Reason for Exam: TRAUMA/ FALL, ON COUMADIN   Acuity: Acute   Type of Exam: Initial   Mechanism of Injury: PT STATES SHE FELL AND HIT HER HEAD YESTERDAY AND WAS   FOUND ON FLOOR TODAY   Relevant Medical/Surgical History: ON COUMADIN       FINDINGS:   BRAIN/VENTRICLES: There is no acute intracranial hemorrhage, mass effect or   midline shift.  No abnormal extra-axial fluid collection.  The gray-white   differentiation is maintained without evidence of an acute infarct.  There is   no evidence of hydrocephalus. Moderate periventricular and deep subcortical   white matter hypodensity is present.  Diffuse atrophy.       ORBITS: The visualized portion of the orbits demonstrate no acute abnormality.       SINUSES: The visualized paranasal sinuses and mastoid air cells demonstrate   no acute abnormality.       SOFT TISSUES/SKULL:  No acute abnormality of the visualized skull or soft   tissues.           Impression   No acute intracranial abnormality.       Age related changes including chronic small vessel ischemic disease

## 2018-10-26 NOTE — DISCHARGE INSTR - COC
Continuity of Care Form    Patient Name: Dillan Cobos   :  1931  MRN:  [id number]    Admit date:  10/26/2018  Discharge date:  10/26/2018    Code Status Order: DNR  Advance Directives:     Admitting Physician:  No admitting provider for patient encounter.   PCP: Trang Plasencia MD    Discharging Nurse: [facility] Unit/Room#: ED26/ED-26  Discharging Unit Phone Number: [phone]    Emergency Contact:   Extended Emergency Contact Information  Primary Emergency Contact: [facility]  Address: [address] [place] [address] Phone: [phone]  Mobile Phone: [phone]  Relation: Child    Past Surgical History:  Past Surgical History:   Procedure Laterality Date    APPENDECTOMY      BLADDER SUSPENSION  's    BREAST CYST EXCISION Right     Benign    COLONOSCOPY  Last Done In     Polpys Removed In Past    DENTAL SURGERY      Teeth Extracted In Past    DILATION AND CURETTAGE OF UTERUS      \"2 Or 3 \" Prior To DUANE In     FRACTURE SURGERY Left 12    Broken Left Femur With Hardware    HYSTERECTOMY, TOTAL ABDOMINAL      JOINT REPLACEMENT Right 14    Total Right Knee    OTHER SURGICAL HISTORY  11/23/15    Robotic Assisted Laparoscopic Sacrocolpopexy    OTHER SURGICAL HISTORY Right 2018    orif right patella    OVARIAN CYST SURGERY Right     \"Right\"    SKIN CANCER EXCISION      Skin Cancer Excised Back Of Right Leg    TOE AMPUTATION Right  Or     Right Foot Little Toe    TOE AMPUTATION Right 13    Second Toe    TONSILLECTOMY      TOTAL HIP ARTHROPLASTY Right 2016       Immunization History:   Immunization History   Administered Date(s) Administered    Pneumococcal Conjugate 7-valent 2007       Active Problems:  Patient Active Problem List   Diagnosis Code    Closed fracture of right hip ([address] S72.001A    HTN (hypertension) I10    Vaginal vault

## 2018-10-26 NOTE — ED NOTES
Pt placed on monitor, in gown and repositioned in bed for comfort. Call light within reach and instructed to place on light with any needs or concerns. No needs identified from patient or family at this time.       Cheryl Thornton RN  10/26/18 [id number]

## 2018-10-26 NOTE — ED PROVIDER NOTES
In 1972 Left Breast Area, Second Time In 2000 Right Lower Abdomen\"    Skin Cancer Excised Back Of Right Leg 2000's    Teeth missing     Upper And Lower    Urinary incontinence     Wears dentures     Full Upper    Wears glasses      Past Surgical History:   Procedure Laterality Date    APPENDECTOMY  1941    BLADDER SUSPENSION  1990's    BREAST CYST EXCISION Right 1955    Benign    COLONOSCOPY  Last Done In 2000's    Polpys Removed In Past    DENTAL SURGERY      Teeth Extracted In Past    DILATION AND CURETTAGE OF UTERUS      \"2 Or 3 \" Prior To DUANE In 1970's    FRACTURE SURGERY Left 7-7-12    Broken Left Femur With Hardware    HYSTERECTOMY, TOTAL ABDOMINAL  1970's    JOINT REPLACEMENT Right 12-2-14    Total Right Knee    OTHER SURGICAL HISTORY  11/23/15    Robotic Assisted Laparoscopic Sacrocolpopexy    OTHER SURGICAL HISTORY Right 09/04/2018    orif right patella    OVARIAN CYST SURGERY Right 1957    \"Right\"    SKIN CANCER EXCISION  2000's    Skin Cancer Excised Back Of Right Leg    TOE AMPUTATION Right 1990's Or 2000's    Right Foot Little Toe    TOE AMPUTATION Right 5-23-13    Second Toe    TONSILLECTOMY  1962    TOTAL HIP ARTHROPLASTY Right 08/21/2016     Family History   Problem Relation Age of Onset    Kidney Disease Mother     Heart Disease Mother         Heart Attack     Early Death Father 44        \"Muster Gas\"    Heart Disease Sister         Heart Attack    Early Death Brother 46    Diabetes Brother     Early Death Sister 2    Cancer Brother         Prostate Cancer    Kidney Disease Brother     Diabetes Brother     Diabetes Brother     Heart Disease Brother     Diabetes Son      Social History     Social History    Marital status:      Spouse name: N/A    Number of children: N/A    Years of education: N/A     Occupational History    Not on file.      Social History Main Topics    Smoking status: Never Smoker    Smokeless tobacco: Never Used    Alcohol use No

## 2018-10-26 NOTE — CARE COORDINATION
LSW was consulted for this pt to assist with d/c planning. LSW went to room #26 and pt in CT/x-ray. Pt's dgt, Sarah Sylvester, and grandson in room. Juanita Ben explained Pt's right leg is still casted related to 2 surgeries involving a fractured patella. Pt was transferred from [facility] to [place] on 9/9/18. Pt was released from [place] 10/24 d/t insurance running out. Pt had [address] 1st visit today. Pt has fallen twice since being home and requires 2 people to lift her. EMS needed to be called for assistance. Juanita Canelaabimbolanilsa explained pt lives alone and they are exploring AL via [place] but need to sell pt's house 1st. LSW noted pt requires 3 day in-pt qualifying stay to be able to return to SNF. LSW to call [place] and Juanita Contreras noted RN from [facility] Arturo Kunz already called this am and if pt returns, she will be private pay. La [facility] [place] [facility] call [facility]SW concerning pt.    1441 LSW received t/c from [place]. She explained she submitted for a precert for pt earlier today after [address] nurse contacted her. However, unlikely pt will be approved and may not have approval/denail until Monday. Pt was d/c'd 10/24 from [place] and was informed she could private pay (PP) $224.00/day to stay @ . Also, pt was referred to [address] [place] [address] and [facility] for PP option. Once pt's cast is removed, pt may be approved for SNF-PT. Otherwise, unless there is something new/acute, pt's insurance unlikely to cover pt's stay. 46 LSW informed Dr. Donovan Samuels of above information. Dr. Donovan Samuels and this LSW then spoke to pt, her dgt and grandson about options:  1) Return home w/[facility] and Utilize PP HH or PP @ [place]. LSW discussed costs for [place] PP. Pt 's dgt and grandson both believe pt should utilize [place] and pt agreed. LSW then phoned Junior and arranged for pt to return to [place]. Junior will need ARIELLE, AVS and meds faxed. Also, she noted pt will be required to pay for 1st 7 seven days (1638.00) up front.  LSW notified pt and family of this

## 2018-10-28 PROCEDURE — 93010 ELECTROCARDIOGRAM REPORT: CPT | Performed by: INTERNAL MEDICINE

## 2018-10-31 LAB
EKG ATRIAL RATE: 68 BPM
EKG DIAGNOSIS: NORMAL
EKG P AXIS: 40 DEGREES
EKG P-R INTERVAL: 174 MS
EKG Q-T INTERVAL: 450 MS
EKG QRS DURATION: 80 MS
EKG QTC CALCULATION (BAZETT): 478 MS
EKG R AXIS: 41 DEGREES
EKG T AXIS: 50 DEGREES
EKG VENTRICULAR RATE: 68 BPM

## 2018-11-02 ENCOUNTER — HOSPITAL ENCOUNTER (OUTPATIENT)
Age: 83
Discharge: HOME | End: 2018-11-02

## 2018-11-02 LAB
INR BLD: 4.55 INDEX
PROTHROMBIN TIME: 51.1 SECONDS (ref 9.12–12.5)

## 2018-11-02 PROCEDURE — 36415 COLL VENOUS BLD VENIPUNCTURE: CPT

## 2018-11-02 PROCEDURE — 85610 PROTHROMBIN TIME: CPT

## 2018-11-06 ENCOUNTER — HOSPITAL ENCOUNTER (OUTPATIENT)
Age: 83
Discharge: HOME | End: 2018-11-06

## 2018-11-06 LAB
ANION GAP SERPL CALCULATED.3IONS-SCNC: 13 MMOL/L (ref 4–16)
BUN BLDV-MCNC: 18 MG/DL (ref 6–23)
CALCIUM SERPL-MCNC: 8.8 MG/DL (ref 8.3–10.6)
CHLORIDE BLD-SCNC: 103 MMOL/L (ref 99–110)
CO2: 29 MMOL/L (ref 21–32)
CREAT SERPL-MCNC: 0.8 MG/DL (ref 0.6–1.1)
GFR AFRICAN AMERICAN: >60 ML/MIN/1.73M2
GFR NON-AFRICAN AMERICAN: >60 ML/MIN/1.73M2
GLUCOSE BLD-MCNC: 101 MG/DL (ref 70–99)
INR BLD: 1.51 INDEX
POTASSIUM SERPL-SCNC: 3.5 MMOL/L (ref 3.5–5.1)
PROTHROMBIN TIME: 17.1 SECONDS (ref 9.12–12.5)
SODIUM BLD-SCNC: 145 MMOL/L (ref 135–145)

## 2018-11-06 PROCEDURE — 85610 PROTHROMBIN TIME: CPT

## 2018-11-06 PROCEDURE — 80048 BASIC METABOLIC PNL TOTAL CA: CPT

## 2018-11-06 PROCEDURE — 36415 COLL VENOUS BLD VENIPUNCTURE: CPT

## 2018-11-09 ENCOUNTER — HOSPITAL ENCOUNTER (OUTPATIENT)
Age: 83
Discharge: HOME | End: 2018-11-09
Payer: MEDICARE

## 2018-11-09 LAB
INR BLD: 1.37 INDEX
PROTHROMBIN TIME: 15.6 SECONDS (ref 9.12–12.5)

## 2018-11-09 PROCEDURE — 85610 PROTHROMBIN TIME: CPT

## 2018-11-09 PROCEDURE — 36415 COLL VENOUS BLD VENIPUNCTURE: CPT

## 2018-11-13 ENCOUNTER — HOSPITAL ENCOUNTER (OUTPATIENT)
Age: 83
Discharge: HOME | End: 2018-11-13

## 2018-11-13 LAB
ANION GAP SERPL CALCULATED.3IONS-SCNC: 16 MMOL/L (ref 4–16)
BUN BLDV-MCNC: 16 MG/DL (ref 6–23)
CALCIUM SERPL-MCNC: 8.8 MG/DL (ref 8.3–10.6)
CHLORIDE BLD-SCNC: 104 MMOL/L (ref 99–110)
CO2: 27 MMOL/L (ref 21–32)
CREAT SERPL-MCNC: 0.8 MG/DL (ref 0.6–1.1)
GFR AFRICAN AMERICAN: >60 ML/MIN/1.73M2
GFR NON-AFRICAN AMERICAN: >60 ML/MIN/1.73M2
GLUCOSE BLD-MCNC: 104 MG/DL (ref 70–99)
INR BLD: 1.51 INDEX
POTASSIUM SERPL-SCNC: 3.8 MMOL/L (ref 3.5–5.1)
PROTHROMBIN TIME: 17.1 SECONDS (ref 9.12–12.5)
SODIUM BLD-SCNC: 147 MMOL/L (ref 135–145)

## 2018-11-13 PROCEDURE — 85610 PROTHROMBIN TIME: CPT

## 2018-11-13 PROCEDURE — 80048 BASIC METABOLIC PNL TOTAL CA: CPT

## 2018-11-13 PROCEDURE — 36415 COLL VENOUS BLD VENIPUNCTURE: CPT

## 2018-11-20 ENCOUNTER — HOSPITAL ENCOUNTER (OUTPATIENT)
Age: 83
Discharge: HOME | End: 2018-11-20

## 2018-11-20 LAB
ANION GAP SERPL CALCULATED.3IONS-SCNC: 13 MMOL/L (ref 4–16)
BUN BLDV-MCNC: 20 MG/DL (ref 6–23)
CALCIUM SERPL-MCNC: 9 MG/DL (ref 8.3–10.6)
CHLORIDE BLD-SCNC: 105 MMOL/L (ref 99–110)
CO2: 29 MMOL/L (ref 21–32)
CREAT SERPL-MCNC: 0.9 MG/DL (ref 0.6–1.1)
GFR AFRICAN AMERICAN: >60 ML/MIN/1.73M2
GFR NON-AFRICAN AMERICAN: 59 ML/MIN/1.73M2
GLUCOSE BLD-MCNC: 95 MG/DL (ref 70–99)
INR BLD: 2.09 INDEX
POTASSIUM SERPL-SCNC: 4 MMOL/L (ref 3.5–5.1)
PROTHROMBIN TIME: 23.6 SECONDS (ref 9.12–12.5)
SODIUM BLD-SCNC: 147 MMOL/L (ref 135–145)

## 2018-11-20 PROCEDURE — 36415 COLL VENOUS BLD VENIPUNCTURE: CPT

## 2018-11-20 PROCEDURE — 85610 PROTHROMBIN TIME: CPT

## 2018-11-20 PROCEDURE — 80048 BASIC METABOLIC PNL TOTAL CA: CPT

## 2018-11-27 ENCOUNTER — HOSPITAL ENCOUNTER (OUTPATIENT)
Age: 83
Discharge: HOME | End: 2018-11-27

## 2018-11-27 LAB
INR BLD: 2.24 INDEX
PROTHROMBIN TIME: 25.8 SECONDS (ref 9.12–12.5)

## 2018-11-27 PROCEDURE — 36415 COLL VENOUS BLD VENIPUNCTURE: CPT

## 2018-11-27 PROCEDURE — 85610 PROTHROMBIN TIME: CPT

## 2019-08-14 ENCOUNTER — HOSPITAL ENCOUNTER (OUTPATIENT)
Dept: WOMENS IMAGING | Age: 84
Discharge: HOME | End: 2019-08-14
Payer: MEDICARE

## 2019-08-14 DIAGNOSIS — Z12.31 ENCOUNTER FOR SCREENING MAMMOGRAM FOR BREAST CANCER: ICD-10-CM

## 2019-08-14 PROCEDURE — 77063 BREAST TOMOSYNTHESIS BI: CPT

## 2020-01-01 ENCOUNTER — HOSPITAL ENCOUNTER (OUTPATIENT)
Age: 85
Setting detail: SPECIMEN
Discharge: HOME | End: 2020-01-24

## 2020-01-01 ENCOUNTER — HOSPITAL ENCOUNTER (OUTPATIENT)
Age: 85
Setting detail: SPECIMEN
Discharge: HOME | End: 2020-02-06
Payer: MEDICARE

## 2020-01-01 ENCOUNTER — APPOINTMENT (OUTPATIENT)
Dept: CT IMAGING | Age: 85
DRG: 554 | End: 2020-01-01
Payer: MEDICARE

## 2020-01-01 ENCOUNTER — HOSPITAL ENCOUNTER (INPATIENT)
Age: 85
LOS: 2 days | DRG: 951 | End: 2020-10-16
Attending: FAMILY MEDICINE | Admitting: FAMILY MEDICINE
Payer: COMMERCIAL

## 2020-01-01 ENCOUNTER — OFFICE VISIT (OUTPATIENT)
Dept: INFECTIOUS DISEASES | Age: 85
End: 2020-01-01
Payer: MEDICARE

## 2020-01-01 ENCOUNTER — HOSPITAL ENCOUNTER (OUTPATIENT)
Age: 85
Setting detail: OBSERVATION
Discharge: NURSING, REHAB OR LONG-TERM CARE | End: 2020-01-06
Attending: EMERGENCY MEDICINE | Admitting: INTERNAL MEDICINE
Payer: MEDICARE

## 2020-01-01 ENCOUNTER — HOSPITAL ENCOUNTER (OUTPATIENT)
Age: 85
Setting detail: SPECIMEN
Discharge: HOME | End: 2020-01-14

## 2020-01-01 ENCOUNTER — APPOINTMENT (OUTPATIENT)
Dept: CT IMAGING | Age: 85
DRG: 871 | End: 2020-01-01
Payer: MEDICARE

## 2020-01-01 ENCOUNTER — APPOINTMENT (OUTPATIENT)
Dept: GENERAL RADIOLOGY | Age: 85
DRG: 554 | End: 2020-01-01
Payer: MEDICARE

## 2020-01-01 ENCOUNTER — APPOINTMENT (OUTPATIENT)
Dept: GENERAL RADIOLOGY | Age: 85
End: 2020-01-01
Payer: MEDICARE

## 2020-01-01 ENCOUNTER — OFFICE VISIT (OUTPATIENT)
Dept: BARIATRICS/WEIGHT MGMT | Age: 85
End: 2020-01-01
Payer: MEDICARE

## 2020-01-01 ENCOUNTER — APPOINTMENT (OUTPATIENT)
Dept: CT IMAGING | Age: 85
DRG: 683 | End: 2020-01-01
Payer: MEDICARE

## 2020-01-01 ENCOUNTER — HOSPITAL ENCOUNTER (OUTPATIENT)
Dept: MRI IMAGING | Age: 85
Discharge: HOME | End: 2020-05-19
Payer: MEDICARE

## 2020-01-01 ENCOUNTER — HOSPITAL ENCOUNTER (OUTPATIENT)
Age: 85
Setting detail: SPECIMEN
Discharge: HOME | End: 2020-02-21

## 2020-01-01 ENCOUNTER — APPOINTMENT (OUTPATIENT)
Dept: CT IMAGING | Age: 85
End: 2020-01-01
Payer: MEDICARE

## 2020-01-01 ENCOUNTER — APPOINTMENT (OUTPATIENT)
Dept: GENERAL RADIOLOGY | Age: 85
DRG: 683 | End: 2020-01-01
Payer: MEDICARE

## 2020-01-01 ENCOUNTER — HOSPITAL ENCOUNTER (OUTPATIENT)
Age: 85
Setting detail: SPECIMEN
Discharge: HOME | End: 2020-02-14

## 2020-01-01 ENCOUNTER — HOSPITAL ENCOUNTER (OUTPATIENT)
Age: 85
Setting detail: SPECIMEN
Discharge: HOME | End: 2020-01-21

## 2020-01-01 ENCOUNTER — HOSPITAL ENCOUNTER (INPATIENT)
Age: 85
LOS: 1 days | Discharge: NURSING, REHAB OR LONG-TERM CARE | DRG: 683 | End: 2020-02-03
Attending: EMERGENCY MEDICINE | Admitting: INTERNAL MEDICINE
Payer: MEDICARE

## 2020-01-01 ENCOUNTER — HOSPITAL ENCOUNTER (INPATIENT)
Age: 85
LOS: 3 days | Discharge: NURSING, REHAB OR LONG-TERM CARE | DRG: 554 | End: 2020-05-30
Attending: INTERNAL MEDICINE | Admitting: INTERNAL MEDICINE
Payer: MEDICARE

## 2020-01-01 ENCOUNTER — HOSPITAL ENCOUNTER (OUTPATIENT)
Age: 85
Setting detail: SPECIMEN
Discharge: HOME | End: 2020-02-05
Payer: MEDICARE

## 2020-01-01 ENCOUNTER — HOSPITAL ENCOUNTER (OUTPATIENT)
Age: 85
Setting detail: SPECIMEN
Discharge: HOME | End: 2020-01-17

## 2020-01-01 ENCOUNTER — ANESTHESIA (OUTPATIENT)
Dept: OPERATING ROOM | Age: 85
DRG: 554 | End: 2020-01-01
Payer: MEDICARE

## 2020-01-01 ENCOUNTER — TELEPHONE (OUTPATIENT)
Dept: BARIATRICS/WEIGHT MGMT | Age: 85
End: 2020-01-01

## 2020-01-01 ENCOUNTER — HOSPITAL ENCOUNTER (OUTPATIENT)
Age: 85
Setting detail: SPECIMEN
Discharge: HOME | End: 2020-01-10

## 2020-01-01 ENCOUNTER — TELEPHONE (OUTPATIENT)
Dept: SURGERY | Age: 85
End: 2020-01-01

## 2020-01-01 ENCOUNTER — HOSPITAL ENCOUNTER (OUTPATIENT)
Age: 85
Setting detail: SPECIMEN
Discharge: HOME | End: 2020-01-07
Payer: MEDICARE

## 2020-01-01 ENCOUNTER — HOSPITAL ENCOUNTER (OUTPATIENT)
Age: 85
Setting detail: SPECIMEN
Discharge: HOME | End: 2020-02-11

## 2020-01-01 ENCOUNTER — HOSPITAL ENCOUNTER (OUTPATIENT)
Age: 85
Setting detail: SPECIMEN
Discharge: HOME | End: 2020-02-18

## 2020-01-01 ENCOUNTER — HOSPITAL ENCOUNTER (OUTPATIENT)
Age: 85
Setting detail: SPECIMEN
Discharge: HOME | End: 2020-02-07

## 2020-01-01 ENCOUNTER — ANESTHESIA EVENT (OUTPATIENT)
Dept: OPERATING ROOM | Age: 85
DRG: 554 | End: 2020-01-01
Payer: MEDICARE

## 2020-01-01 ENCOUNTER — HOSPITAL ENCOUNTER (OUTPATIENT)
Age: 85
Setting detail: SPECIMEN
Discharge: HOME | End: 2020-02-04

## 2020-01-01 ENCOUNTER — APPOINTMENT (OUTPATIENT)
Dept: ULTRASOUND IMAGING | Age: 85
End: 2020-01-01
Payer: MEDICARE

## 2020-01-01 ENCOUNTER — APPOINTMENT (OUTPATIENT)
Dept: GENERAL RADIOLOGY | Age: 85
DRG: 871 | End: 2020-01-01
Payer: MEDICARE

## 2020-01-01 ENCOUNTER — HOSPITAL ENCOUNTER (INPATIENT)
Age: 85
LOS: 2 days | Discharge: HOSPICE | DRG: 871 | End: 2020-10-14
Attending: EMERGENCY MEDICINE | Admitting: INTERNAL MEDICINE
Payer: MEDICARE

## 2020-01-01 ENCOUNTER — HOSPITAL ENCOUNTER (OUTPATIENT)
Age: 85
Setting detail: SPECIMEN
Discharge: HOME | End: 2020-02-19

## 2020-01-01 ENCOUNTER — HOSPITAL ENCOUNTER (INPATIENT)
Age: 85
LOS: 11 days | Discharge: NURSING, REHAB OR LONG-TERM CARE | DRG: 603 | End: 2020-06-10
Attending: PHYSICAL MEDICINE & REHABILITATION | Admitting: PHYSICAL MEDICINE & REHABILITATION
Payer: MEDICARE

## 2020-01-01 VITALS
SYSTOLIC BLOOD PRESSURE: 158 MMHG | HEIGHT: 65 IN | DIASTOLIC BLOOD PRESSURE: 68 MMHG | TEMPERATURE: 98.4 F | OXYGEN SATURATION: 97 % | RESPIRATION RATE: 16 BRPM | HEART RATE: 84 BPM | BODY MASS INDEX: 35.49 KG/M2 | WEIGHT: 213 LBS

## 2020-01-01 VITALS
HEART RATE: 78 BPM | DIASTOLIC BLOOD PRESSURE: 61 MMHG | WEIGHT: 204 LBS | OXYGEN SATURATION: 94 % | RESPIRATION RATE: 16 BRPM | BODY MASS INDEX: 33.99 KG/M2 | TEMPERATURE: 98.6 F | SYSTOLIC BLOOD PRESSURE: 135 MMHG | HEIGHT: 65 IN

## 2020-01-01 VITALS
HEART RATE: 115 BPM | OXYGEN SATURATION: 65 % | DIASTOLIC BLOOD PRESSURE: 61 MMHG | RESPIRATION RATE: 26 BRPM | BODY MASS INDEX: 33.86 KG/M2 | WEIGHT: 203.48 LBS | TEMPERATURE: 98.5 F | SYSTOLIC BLOOD PRESSURE: 140 MMHG

## 2020-01-01 VITALS — HEART RATE: 72 BPM | SYSTOLIC BLOOD PRESSURE: 137 MMHG | TEMPERATURE: 98 F | DIASTOLIC BLOOD PRESSURE: 59 MMHG

## 2020-01-01 VITALS
WEIGHT: 211.64 LBS | DIASTOLIC BLOOD PRESSURE: 87 MMHG | HEART RATE: 118 BPM | BODY MASS INDEX: 35.26 KG/M2 | SYSTOLIC BLOOD PRESSURE: 189 MMHG | TEMPERATURE: 97.7 F | OXYGEN SATURATION: 85 % | RESPIRATION RATE: 30 BRPM | HEIGHT: 65 IN

## 2020-01-01 VITALS
BODY MASS INDEX: 33.84 KG/M2 | OXYGEN SATURATION: 92 % | RESPIRATION RATE: 16 BRPM | HEART RATE: 60 BPM | SYSTOLIC BLOOD PRESSURE: 164 MMHG | DIASTOLIC BLOOD PRESSURE: 56 MMHG | HEIGHT: 65 IN | WEIGHT: 203.1 LBS | TEMPERATURE: 98 F

## 2020-01-01 VITALS
DIASTOLIC BLOOD PRESSURE: 52 MMHG | RESPIRATION RATE: 14 BRPM | OXYGEN SATURATION: 99 % | SYSTOLIC BLOOD PRESSURE: 117 MMHG

## 2020-01-01 VITALS
WEIGHT: 213 LBS | OXYGEN SATURATION: 92 % | TEMPERATURE: 98.2 F | RESPIRATION RATE: 12 BRPM | DIASTOLIC BLOOD PRESSURE: 78 MMHG | SYSTOLIC BLOOD PRESSURE: 148 MMHG | HEART RATE: 104 BPM | HEIGHT: 65 IN | BODY MASS INDEX: 35.49 KG/M2

## 2020-01-01 VITALS
DIASTOLIC BLOOD PRESSURE: 70 MMHG | BODY MASS INDEX: 35.82 KG/M2 | WEIGHT: 215 LBS | RESPIRATION RATE: 18 BRPM | OXYGEN SATURATION: 94 % | TEMPERATURE: 97.7 F | SYSTOLIC BLOOD PRESSURE: 149 MMHG | HEIGHT: 65 IN | HEART RATE: 87 BPM

## 2020-01-01 VITALS
SYSTOLIC BLOOD PRESSURE: 164 MMHG | RESPIRATION RATE: 16 BRPM | BODY MASS INDEX: 37.57 KG/M2 | WEIGHT: 225.5 LBS | HEART RATE: 89 BPM | OXYGEN SATURATION: 94 % | DIASTOLIC BLOOD PRESSURE: 75 MMHG | TEMPERATURE: 98.9 F | HEIGHT: 65 IN

## 2020-01-01 VITALS
DIASTOLIC BLOOD PRESSURE: 76 MMHG | HEART RATE: 77 BPM | SYSTOLIC BLOOD PRESSURE: 142 MMHG | BODY MASS INDEX: 36.49 KG/M2 | OXYGEN SATURATION: 97 % | WEIGHT: 219 LBS | HEIGHT: 65 IN | TEMPERATURE: 98.2 F

## 2020-01-01 VITALS — SYSTOLIC BLOOD PRESSURE: 162 MMHG | TEMPERATURE: 97.3 F | HEART RATE: 89 BPM | DIASTOLIC BLOOD PRESSURE: 68 MMHG

## 2020-01-01 LAB
A/G RATIO: 1.6
ADENOVIRUS DETECTION BY PCR: NOT DETECTED
ALBUMIN SERPL-MCNC: 3.2 GM/DL (ref 3.4–5)
ALBUMIN SERPL-MCNC: 3.2 GM/DL (ref 3.4–5)
ALBUMIN SERPL-MCNC: 3.4 GM/DL (ref 3.4–5)
ALBUMIN SERPL-MCNC: 3.7 GM/DL (ref 3.4–5)
ALBUMIN SERPL-MCNC: 3.8 G/DL
ALBUMIN SERPL-MCNC: 3.8 GM/DL (ref 3.4–5)
ALBUMIN SERPL-MCNC: 3.8 GM/DL (ref 3.4–5)
ALBUMIN SERPL-MCNC: 3.9 GM/DL (ref 3.4–5)
ALBUMIN SERPL-MCNC: 3.9 GM/DL (ref 3.4–5)
ALBUMIN SERPL-MCNC: 4 GM/DL (ref 3.4–5)
ALBUMIN SERPL-MCNC: 4.3 GM/DL (ref 3.4–5)
ALP BLD-CCNC: 112 IU/L (ref 40–128)
ALP BLD-CCNC: 112 IU/L (ref 40–129)
ALP BLD-CCNC: 128 IU/L (ref 40–128)
ALP BLD-CCNC: 128 IU/L (ref 40–129)
ALP BLD-CCNC: 144 IU/L (ref 40–129)
ALP BLD-CCNC: 159 IU/L (ref 40–128)
ALP BLD-CCNC: 67 IU/L (ref 40–128)
ALP BLD-CCNC: 68 IU/L (ref 40–129)
ALP BLD-CCNC: 70 IU/L (ref 40–129)
ALP BLD-CCNC: 82 IU/L (ref 40–129)
ALP BLD-CCNC: 87 U/L
ALP BLD-CCNC: 89 IU/L (ref 40–129)
ALP BLD-CCNC: 92 IU/L (ref 40–129)
ALT SERPL-CCNC: 12 U/L (ref 10–40)
ALT SERPL-CCNC: 14 U/L
ALT SERPL-CCNC: 14 U/L (ref 10–40)
ALT SERPL-CCNC: 14 U/L (ref 10–40)
ALT SERPL-CCNC: 17 U/L (ref 10–40)
ALT SERPL-CCNC: 20 U/L (ref 10–40)
ALT SERPL-CCNC: 20 U/L (ref 10–40)
ALT SERPL-CCNC: 25 U/L (ref 10–40)
ALT SERPL-CCNC: 25 U/L (ref 10–40)
ALT SERPL-CCNC: 29 U/L (ref 10–40)
ALT SERPL-CCNC: 30 U/L (ref 10–40)
ANION GAP SERPL CALCULATED.3IONS-SCNC: 10 MMOL/L (ref 4–16)
ANION GAP SERPL CALCULATED.3IONS-SCNC: 11 MMOL/L (ref 4–16)
ANION GAP SERPL CALCULATED.3IONS-SCNC: 11 MMOL/L (ref 4–16)
ANION GAP SERPL CALCULATED.3IONS-SCNC: 12 MMOL/L (ref 4–16)
ANION GAP SERPL CALCULATED.3IONS-SCNC: 13 MMOL/L (ref 4–16)
ANION GAP SERPL CALCULATED.3IONS-SCNC: 14 MMOL/L (ref 4–16)
ANION GAP SERPL CALCULATED.3IONS-SCNC: 14 MMOL/L (ref 4–16)
ANION GAP SERPL CALCULATED.3IONS-SCNC: 15 MMOL/L (ref 4–16)
ANISOCYTOSIS: ABNORMAL
APTT: 35 SECONDS (ref 25.1–37.1)
APTT: 37.4 SECONDS (ref 25.1–37.1)
APTT: 38.3 SECONDS (ref 25.1–37.1)
AST SERPL-CCNC: 20 IU/L (ref 15–37)
AST SERPL-CCNC: 21 IU/L (ref 15–37)
AST SERPL-CCNC: 25 IU/L (ref 15–37)
AST SERPL-CCNC: 26 IU/L (ref 15–37)
AST SERPL-CCNC: 28 U/L
AST SERPL-CCNC: 33 IU/L (ref 15–37)
AST SERPL-CCNC: 42 IU/L (ref 15–37)
AST SERPL-CCNC: 46 IU/L (ref 15–37)
AST SERPL-CCNC: 46 IU/L (ref 15–37)
AST SERPL-CCNC: 47 IU/L (ref 15–37)
AST SERPL-CCNC: 47 IU/L (ref 15–37)
AST SERPL-CCNC: 67 IU/L (ref 15–37)
AST SERPL-CCNC: 74 IU/L (ref 15–37)
BACTERIA: ABNORMAL /HPF
BACTERIA: ABNORMAL /HPF
BACTERIA: NEGATIVE /HPF
BANDED NEUTROPHILS ABSOLUTE COUNT: 0.44 K/CU MM
BANDED NEUTROPHILS ABSOLUTE COUNT: 1.08 K/CU MM
BANDED NEUTROPHILS ABSOLUTE COUNT: 1.62 K/CU MM
BANDED NEUTROPHILS RELATIVE PERCENT: 10 % (ref 5–11)
BANDED NEUTROPHILS RELATIVE PERCENT: 4 % (ref 5–11)
BANDED NEUTROPHILS RELATIVE PERCENT: 7 % (ref 5–11)
BASE EXCESS MIXED: 4.5 (ref 0–2.3)
BASOPHILS ABSOLUTE: 0 K/CU MM
BASOPHILS ABSOLUTE: 0 K/CU MM
BASOPHILS ABSOLUTE: 0.1 /ΜL
BASOPHILS ABSOLUTE: 0.1 K/CU MM
BASOPHILS ABSOLUTE: 0.1 K/CU MM
BASOPHILS RELATIVE PERCENT: 0.3 % (ref 0–1)
BASOPHILS RELATIVE PERCENT: 0.4 % (ref 0–1)
BASOPHILS RELATIVE PERCENT: 0.4 % (ref 0–1)
BASOPHILS RELATIVE PERCENT: 0.6 % (ref 0–1)
BASOPHILS RELATIVE PERCENT: 1 %
BILIRUB SERPL-MCNC: 0.3 MG/DL (ref 0–1)
BILIRUB SERPL-MCNC: 0.4 MG/DL (ref 0–1)
BILIRUB SERPL-MCNC: 0.5 MG/DL (ref 0.1–1.4)
BILIRUB SERPL-MCNC: 0.5 MG/DL (ref 0–1)
BILIRUB SERPL-MCNC: 0.7 MG/DL (ref 0–1)
BILIRUB SERPL-MCNC: 1.1 MG/DL (ref 0–1)
BILIRUB SERPL-MCNC: 1.2 MG/DL (ref 0–1)
BILIRUB SERPL-MCNC: 1.3 MG/DL (ref 0–1)
BILIRUB SERPL-MCNC: 1.3 MG/DL (ref 0–1)
BILIRUBIN DIRECT: 0.1 MG/DL
BILIRUBIN DIRECT: 0.3 MG/DL (ref 0–0.3)
BILIRUBIN DIRECT: 0.6 MG/DL (ref 0–0.3)
BILIRUBIN DIRECT: 0.6 MG/DL (ref 0–0.3)
BILIRUBIN URINE: NEGATIVE MG/DL
BILIRUBIN, INDIRECT: 0
BILIRUBIN, INDIRECT: 0.4 MG/DL (ref 0–0.7)
BILIRUBIN, INDIRECT: 0.6 MG/DL (ref 0–0.7)
BILIRUBIN, INDIRECT: 0.7 MG/DL (ref 0–0.7)
BLASTS ABSOLUTE COUNT: 0.16 K/CU MM
BLASTS RELATIVE PERCENT: 1 %
BLOOD, URINE: ABNORMAL
BLOOD, URINE: ABNORMAL
BLOOD, URINE: NEGATIVE
BORDETELLA PARAPERTUSSIS BY PCR: NOT DETECTED
BORDETELLA PERTUSSIS PCR: NOT DETECTED
BUN BLDV-MCNC: 10 MG/DL (ref 6–23)
BUN BLDV-MCNC: 14 MG/DL
BUN BLDV-MCNC: 15 MG/DL (ref 6–23)
BUN BLDV-MCNC: 16 MG/DL (ref 6–23)
BUN BLDV-MCNC: 16 MG/DL (ref 6–23)
BUN BLDV-MCNC: 17 MG/DL (ref 6–23)
BUN BLDV-MCNC: 18 MG/DL (ref 6–23)
BUN BLDV-MCNC: 20 MG/DL (ref 6–23)
BUN BLDV-MCNC: 21 MG/DL (ref 6–23)
BUN BLDV-MCNC: 23 MG/DL (ref 6–23)
BUN BLDV-MCNC: 24 MG/DL (ref 6–23)
BURR CELLS: ABNORMAL
C-REACTIVE PROTEIN: 35.4
CALCIUM SERPL-MCNC: 7.4 MG/DL (ref 8.3–10.6)
CALCIUM SERPL-MCNC: 7.9 MG/DL (ref 8.3–10.6)
CALCIUM SERPL-MCNC: 8.3 MG/DL (ref 8.3–10.6)
CALCIUM SERPL-MCNC: 8.4 MG/DL
CALCIUM SERPL-MCNC: 8.4 MG/DL (ref 8.3–10.6)
CALCIUM SERPL-MCNC: 8.6 MG/DL (ref 8.3–10.6)
CALCIUM SERPL-MCNC: 8.7 MG/DL (ref 8.3–10.6)
CALCIUM SERPL-MCNC: 8.7 MG/DL (ref 8.3–10.6)
CALCIUM SERPL-MCNC: 8.8 MG/DL (ref 8.3–10.6)
CALCIUM SERPL-MCNC: 9 MG/DL (ref 8.3–10.6)
CALCIUM SERPL-MCNC: 9.1 MG/DL (ref 8.3–10.6)
CALCIUM SERPL-MCNC: 9.2 MG/DL (ref 8.3–10.6)
CALCIUM SERPL-MCNC: 9.4 MG/DL (ref 8.3–10.6)
CHLAMYDOPHILA PNEUMONIA PCR: NOT DETECTED
CHLORIDE BLD-SCNC: 100 MMOL/L (ref 99–110)
CHLORIDE BLD-SCNC: 100 MMOL/L (ref 99–110)
CHLORIDE BLD-SCNC: 101 MMOL/L (ref 99–110)
CHLORIDE BLD-SCNC: 102 MMOL/L (ref 99–110)
CHLORIDE BLD-SCNC: 103 MMOL/L (ref 99–110)
CHLORIDE BLD-SCNC: 103 MMOL/L (ref 99–110)
CHLORIDE BLD-SCNC: 104 MMOL/L (ref 99–110)
CHLORIDE BLD-SCNC: 105 MMOL/L (ref 99–110)
CHLORIDE BLD-SCNC: 106 MMOL/L
CHLORIDE BLD-SCNC: 107 MMOL/L (ref 99–110)
CHLORIDE BLD-SCNC: 96 MMOL/L (ref 99–110)
CLARITY: ABNORMAL
CLARITY: CLEAR
CO2: 23 MMOL/L (ref 21–32)
CO2: 24 MMOL/L
CO2: 24 MMOL/L (ref 21–32)
CO2: 25 MMOL/L (ref 21–32)
CO2: 26 MMOL/L (ref 21–32)
CO2: 27 MMOL/L (ref 21–32)
CO2: 27 MMOL/L (ref 21–32)
COLOR: ABNORMAL
COLOR: ABNORMAL
COLOR: YELLOW
COMMENT: ABNORMAL
CORONAVIRUS 229E PCR: NOT DETECTED
CORONAVIRUS HKU1 PCR: NOT DETECTED
CORONAVIRUS NL63 PCR: NOT DETECTED
CORONAVIRUS OC43 PCR: NOT DETECTED
CREAT SERPL-MCNC: 0.5 MG/DL (ref 0.6–1.1)
CREAT SERPL-MCNC: 0.5 MG/DL (ref 0.6–1.1)
CREAT SERPL-MCNC: 0.7 MG/DL (ref 0.6–1.1)
CREAT SERPL-MCNC: 0.7 MG/DL (ref 0.6–1.1)
CREAT SERPL-MCNC: 0.8 MG/DL
CREAT SERPL-MCNC: 0.8 MG/DL (ref 0.6–1.1)
CREAT SERPL-MCNC: 0.9 MG/DL (ref 0.6–1.1)
CREAT SERPL-MCNC: 0.9 MG/DL (ref 0.6–1.1)
CREAT SERPL-MCNC: 1 MG/DL (ref 0.6–1.1)
CREAT SERPL-MCNC: 1 MG/DL (ref 0.6–1.1)
CREAT SERPL-MCNC: 1.2 MG/DL (ref 0.6–1.1)
CREAT SERPL-MCNC: 1.3 MG/DL (ref 0.6–1.1)
CULTURE: ABNORMAL
CULTURE: NORMAL
D DIMER: 2265 NG/ML(DDU)
D DIMER: 3060 NG/ML(DDU)
D DIMER: 5241 NG/ML(DDU)
DIFFERENTIAL TYPE: ABNORMAL
EKG ATRIAL RATE: 64 BPM
EKG ATRIAL RATE: 68 BPM
EKG ATRIAL RATE: 78 BPM
EKG DIAGNOSIS: NORMAL
EKG P AXIS: 47 DEGREES
EKG P AXIS: 55 DEGREES
EKG P AXIS: 66 DEGREES
EKG P-R INTERVAL: 144 MS
EKG P-R INTERVAL: 154 MS
EKG P-R INTERVAL: 164 MS
EKG Q-T INTERVAL: 410 MS
EKG Q-T INTERVAL: 422 MS
EKG Q-T INTERVAL: 442 MS
EKG QRS DURATION: 74 MS
EKG QRS DURATION: 82 MS
EKG QRS DURATION: 84 MS
EKG QTC CALCULATION (BAZETT): 448 MS
EKG QTC CALCULATION (BAZETT): 455 MS
EKG QTC CALCULATION (BAZETT): 467 MS
EKG R AXIS: 54 DEGREES
EKG R AXIS: 55 DEGREES
EKG R AXIS: 56 DEGREES
EKG T AXIS: 22 DEGREES
EKG T AXIS: 41 DEGREES
EKG T AXIS: 49 DEGREES
EKG VENTRICULAR RATE: 64 BPM
EKG VENTRICULAR RATE: 68 BPM
EKG VENTRICULAR RATE: 78 BPM
EOSINOPHILS ABSOLUTE: 0.1 K/CU MM
EOSINOPHILS ABSOLUTE: 0.2 K/CU MM
EOSINOPHILS ABSOLUTE: 0.6 /ΜL
EOSINOPHILS RELATIVE PERCENT: 0.7 % (ref 0–3)
EOSINOPHILS RELATIVE PERCENT: 1.5 % (ref 0–3)
EOSINOPHILS RELATIVE PERCENT: 1.6 % (ref 0–3)
EOSINOPHILS RELATIVE PERCENT: 2.3 % (ref 0–3)
EOSINOPHILS RELATIVE PERCENT: 7.3 %
ERYTHROCYTE SEDIMENTATION RATE: 60 MM/HR (ref 0–30)
FERRITIN: 257 NG/ML (ref 15–150)
FIBRINOGEN LEVEL: 211 MG/DL (ref 196.9–442.1)
FIBRINOGEN LEVEL: 240 MG/DL (ref 196.9–442.1)
FIBRINOGEN LEVEL: 267 MG/DL (ref 196.9–442.1)
GFR AFRICAN AMERICAN: 47 ML/MIN/1.73M2
GFR AFRICAN AMERICAN: 51 ML/MIN/1.73M2
GFR AFRICAN AMERICAN: >60 ML/MIN/1.73M2
GFR CALCULATED: 82
GFR NON-AFRICAN AMERICAN: 39 ML/MIN/1.73M2
GFR NON-AFRICAN AMERICAN: 42 ML/MIN/1.73M2
GFR NON-AFRICAN AMERICAN: 52 ML/MIN/1.73M2
GFR NON-AFRICAN AMERICAN: 52 ML/MIN/1.73M2
GFR NON-AFRICAN AMERICAN: 59 ML/MIN/1.73M2
GFR NON-AFRICAN AMERICAN: 59 ML/MIN/1.73M2
GFR NON-AFRICAN AMERICAN: >60 ML/MIN/1.73M2
GIANT PLATELETS: ABNORMAL
GLOBULIN: 0
GLUCOSE BLD-MCNC: 103 MG/DL (ref 70–99)
GLUCOSE BLD-MCNC: 104 MG/DL (ref 70–99)
GLUCOSE BLD-MCNC: 112 MG/DL (ref 70–99)
GLUCOSE BLD-MCNC: 119 MG/DL (ref 70–99)
GLUCOSE BLD-MCNC: 122 MG/DL (ref 70–99)
GLUCOSE BLD-MCNC: 134 MG/DL (ref 70–99)
GLUCOSE BLD-MCNC: 137 MG/DL (ref 70–99)
GLUCOSE BLD-MCNC: 144 MG/DL (ref 70–99)
GLUCOSE BLD-MCNC: 192 MG/DL (ref 70–99)
GLUCOSE BLD-MCNC: 202 MG/DL (ref 70–99)
GLUCOSE BLD-MCNC: 218 MG/DL (ref 70–99)
GLUCOSE BLD-MCNC: 89 MG/DL
GLUCOSE BLD-MCNC: 93 MG/DL (ref 70–99)
GLUCOSE, URINE: NEGATIVE MG/DL
HCO3 VENOUS: 28.4 MMOL/L (ref 19–25)
HCT VFR BLD CALC: 31.4 % (ref 37–47)
HCT VFR BLD CALC: 33.1 % (ref 36–46)
HCT VFR BLD CALC: 33.9 % (ref 37–47)
HCT VFR BLD CALC: 35.4 % (ref 37–47)
HCT VFR BLD CALC: 35.7 % (ref 37–47)
HCT VFR BLD CALC: 36.9 % (ref 37–47)
HCT VFR BLD CALC: 37.5 % (ref 37–47)
HCT VFR BLD CALC: 37.8 % (ref 37–47)
HCT VFR BLD CALC: 40.1 % (ref 37–47)
HCT VFR BLD CALC: 41.8 % (ref 37–47)
HCT VFR BLD CALC: 41.8 % (ref 37–47)
HCT VFR BLD CALC: 43.5 % (ref 37–47)
HEMOGLOBIN: 10.2 G/DL (ref 12–16)
HEMOGLOBIN: 10.5 GM/DL (ref 12.5–16)
HEMOGLOBIN: 10.6 GM/DL (ref 12.5–16)
HEMOGLOBIN: 11 GM/DL (ref 12.5–16)
HEMOGLOBIN: 11.1 GM/DL (ref 12.5–16)
HEMOGLOBIN: 11.2 GM/DL (ref 12.5–16)
HEMOGLOBIN: 11.2 GM/DL (ref 12.5–16)
HEMOGLOBIN: 12.1 GM/DL (ref 12.5–16)
HEMOGLOBIN: 12.4 GM/DL (ref 12.5–16)
HEMOGLOBIN: 13.2 GM/DL (ref 12.5–16)
HEMOGLOBIN: 9.3 GM/DL (ref 12.5–16)
HEMOGLOBIN: 9.9 GM/DL (ref 12.5–16)
HIGH SENSITIVE C-REACTIVE PROTEIN: 14.7 MG/L
HIGH SENSITIVE C-REACTIVE PROTEIN: 16.4 MG/L
HIGH SENSITIVE C-REACTIVE PROTEIN: 18.8 MG/L
HIGH SENSITIVE C-REACTIVE PROTEIN: 30.3 MG/L
HUMAN METAPNEUMOVIRUS PCR: NOT DETECTED
IMMATURE NEUTROPHIL %: 0.5 % (ref 0–0.43)
IMMATURE NEUTROPHIL %: 0.5 % (ref 0–0.43)
IMMATURE NEUTROPHIL %: 0.6 % (ref 0–0.43)
IMMATURE NEUTROPHIL %: 0.8 % (ref 0–0.43)
INFLUENZA A BY PCR: NOT DETECTED
INFLUENZA A H1 (2009) PCR: NOT DETECTED
INFLUENZA A H1 PANDEMIC PCR: NOT DETECTED
INFLUENZA A H3 PCR: NOT DETECTED
INFLUENZA B BY PCR: NOT DETECTED
INR BLD: 1.17 INDEX
INR BLD: 1.23 INDEX
INR BLD: 1.25 INDEX
INR BLD: 1.55 INDEX
INR BLD: 1.66 INDEX
INR BLD: 1.71 INDEX
INR BLD: 1.75 INDEX
INR BLD: 1.88 INDEX
INR BLD: 1.89 INDEX
INR BLD: 1.9 INDEX
INR BLD: 2.01 INDEX
INR BLD: 2.06 INDEX
INR BLD: 2.16 INDEX
INR BLD: 2.25 INDEX
INR BLD: 2.25 INDEX
INR BLD: 2.28 INDEX
INR BLD: 2.29 INDEX
INR BLD: 3.03 INDEX
INR BLD: 3.23 INDEX
INR BLD: 3.83 INDEX
KETONES, URINE: NEGATIVE MG/DL
LACTATE DEHYDROGENASE: 650 IU/L (ref 120–246)
LACTATE: 1.1 MMOL/L (ref 0.4–2)
LACTATE: 1.2 MMOL/L (ref 0.4–2)
LACTIC ACID, SEPSIS: 1.4 MMOL/L (ref 0.5–1.9)
LACTIC ACID, SEPSIS: 1.7 MMOL/L (ref 0.5–1.9)
LACTIC ACID, SEPSIS: 2.3 MMOL/L (ref 0.5–1.9)
LACTIC ACID, SEPSIS: 3 MMOL/L (ref 0.5–1.9)
LEGIONELLA URINARY AG: NEGATIVE
LEUKOCYTE ESTERASE, URINE: ABNORMAL
LEUKOCYTE ESTERASE, URINE: NEGATIVE
LIPASE: 55 IU/L (ref 13–60)
LYMPHOCYTES ABSOLUTE: 0.6 K/CU MM
LYMPHOCYTES ABSOLUTE: 1 K/CU MM
LYMPHOCYTES ABSOLUTE: 1.4 K/CU MM
LYMPHOCYTES ABSOLUTE: 1.6 K/CU MM
LYMPHOCYTES ABSOLUTE: 2 K/CU MM
LYMPHOCYTES ABSOLUTE: 2.1 K/CU MM
LYMPHOCYTES ABSOLUTE: 2.1 K/CU MM
LYMPHOCYTES ABSOLUTE: 2.2 /ΜL
LYMPHOCYTES RELATIVE PERCENT: 13 % (ref 24–44)
LYMPHOCYTES RELATIVE PERCENT: 13 % (ref 24–44)
LYMPHOCYTES RELATIVE PERCENT: 15 % (ref 24–44)
LYMPHOCYTES RELATIVE PERCENT: 15.5 % (ref 24–44)
LYMPHOCYTES RELATIVE PERCENT: 16.7 % (ref 24–44)
LYMPHOCYTES RELATIVE PERCENT: 26 %
LYMPHOCYTES RELATIVE PERCENT: 5 % (ref 24–44)
LYMPHOCYTES RELATIVE PERCENT: 9.6 % (ref 24–44)
Lab: ABNORMAL
Lab: ABNORMAL
Lab: NORMAL
MAGNESIUM: 2 MG/DL (ref 1.8–2.4)
MAGNESIUM: 2 MG/DL (ref 1.8–2.4)
MAGNESIUM: 2.1 MG/DL (ref 1.8–2.4)
MCH RBC QN AUTO: 23.5 PG (ref 27–31)
MCH RBC QN AUTO: 23.6 PG (ref 27–31)
MCH RBC QN AUTO: 24 PG (ref 27–31)
MCH RBC QN AUTO: 25.6 PG
MCH RBC QN AUTO: 26.5 PG (ref 27–31)
MCH RBC QN AUTO: 26.5 PG (ref 27–31)
MCH RBC QN AUTO: 27.5 PG (ref 27–31)
MCH RBC QN AUTO: 27.5 PG (ref 27–31)
MCH RBC QN AUTO: 27.6 PG (ref 27–31)
MCH RBC QN AUTO: 27.6 PG (ref 27–31)
MCH RBC QN AUTO: 27.8 PG (ref 27–31)
MCH RBC QN AUTO: 27.9 PG (ref 27–31)
MCHC RBC AUTO-ENTMCNC: 27.4 % (ref 32–36)
MCHC RBC AUTO-ENTMCNC: 28.9 % (ref 32–36)
MCHC RBC AUTO-ENTMCNC: 29.2 % (ref 32–36)
MCHC RBC AUTO-ENTMCNC: 29.4 % (ref 32–36)
MCHC RBC AUTO-ENTMCNC: 29.6 % (ref 32–36)
MCHC RBC AUTO-ENTMCNC: 29.6 % (ref 32–36)
MCHC RBC AUTO-ENTMCNC: 29.7 % (ref 32–36)
MCHC RBC AUTO-ENTMCNC: 29.9 % (ref 32–36)
MCHC RBC AUTO-ENTMCNC: 29.9 % (ref 32–36)
MCHC RBC AUTO-ENTMCNC: 30.1 % (ref 32–36)
MCHC RBC AUTO-ENTMCNC: 30.3 % (ref 32–36)
MCHC RBC AUTO-ENTMCNC: 30.9 G/DL
MCV RBC AUTO: 101.3 FL (ref 78–100)
MCV RBC AUTO: 79.2 FL (ref 78–100)
MCV RBC AUTO: 80.4 FL (ref 78–100)
MCV RBC AUTO: 81.2 FL (ref 78–100)
MCV RBC AUTO: 82.6 FL
MCV RBC AUTO: 89.5 FL (ref 78–100)
MCV RBC AUTO: 90.6 FL (ref 78–100)
MCV RBC AUTO: 91.8 FL (ref 78–100)
MCV RBC AUTO: 91.9 FL (ref 78–100)
MCV RBC AUTO: 92.7 FL (ref 78–100)
MCV RBC AUTO: 93.1 FL (ref 78–100)
MCV RBC AUTO: 93.5 FL (ref 78–100)
METAMYELOCYTES ABSOLUTE COUNT: 0.11 K/CU MM
METAMYELOCYTES ABSOLUTE COUNT: 0.31 K/CU MM
METAMYELOCYTES ABSOLUTE COUNT: 0.32 K/CU MM
METAMYELOCYTES PERCENT: 1 %
METAMYELOCYTES PERCENT: 2 %
METAMYELOCYTES PERCENT: 2 %
MONOCYTES ABSOLUTE: 0.1 K/CU MM
MONOCYTES ABSOLUTE: 0.3 K/CU MM
MONOCYTES ABSOLUTE: 0.8 /ΜL
MONOCYTES ABSOLUTE: 0.8 K/CU MM
MONOCYTES ABSOLUTE: 1 K/CU MM
MONOCYTES ABSOLUTE: 1.1 K/CU MM
MONOCYTES ABSOLUTE: 1.3 K/CU MM
MONOCYTES ABSOLUTE: 1.6 K/CU MM
MONOCYTES RELATIVE PERCENT: 1 % (ref 0–4)
MONOCYTES RELATIVE PERCENT: 10 % (ref 0–4)
MONOCYTES RELATIVE PERCENT: 10.2 % (ref 0–4)
MONOCYTES RELATIVE PERCENT: 11.2 % (ref 0–4)
MONOCYTES RELATIVE PERCENT: 2 % (ref 0–4)
MONOCYTES RELATIVE PERCENT: 7.6 % (ref 0–4)
MONOCYTES RELATIVE PERCENT: 9.9 %
MONOCYTES RELATIVE PERCENT: 9.9 % (ref 0–4)
MUCUS: ABNORMAL HPF
MYCOPLASMA PNEUMONIAE PCR: NOT DETECTED
MYELOCYTE PERCENT: 2 %
MYELOCYTE PERCENT: 2 %
MYELOCYTES ABSOLUTE COUNT: 0.31 K/CU MM
MYELOCYTES ABSOLUTE COUNT: 0.32 K/CU MM
NEUTROPHILS ABSOLUTE: 4.7 /ΜL
NEUTROPHILS RELATIVE PERCENT: 55.8 %
NITRITE URINE, QUANTITATIVE: NEGATIVE
NITRITE URINE, QUANTITATIVE: POSITIVE
NUCLEATED RBC %: 0 %
O2 SAT, VEN: 95.5 % (ref 50–70)
PARAINFLUENZA 1 PCR: NOT DETECTED
PARAINFLUENZA 2 PCR: NOT DETECTED
PARAINFLUENZA 3 PCR: NOT DETECTED
PARAINFLUENZA 4 PCR: NOT DETECTED
PCO2, VEN: 39 MMHG (ref 38–52)
PDW BLD-RTO: 15.1 % (ref 11.7–14.9)
PDW BLD-RTO: 15.4 % (ref 11.7–14.9)
PDW BLD-RTO: 15.8 % (ref 11.7–14.9)
PDW BLD-RTO: 15.8 % (ref 11.7–14.9)
PDW BLD-RTO: 15.9 % (ref 11.7–14.9)
PDW BLD-RTO: 16.9 % (ref 11.7–14.9)
PDW BLD-RTO: 17.5 % (ref 11.7–14.9)
PDW BLD-RTO: 17.6 % (ref 11.7–14.9)
PDW BLD-RTO: 17.7 %
PH VENOUS: 7.47 (ref 7.32–7.42)
PH, URINE: 5 (ref 5–8)
PH, URINE: 6 (ref 5–8)
PH, URINE: 7 (ref 5–8)
PH, URINE: 9 (ref 5–8)
PH, URINE: 9 (ref 5–8)
PHOSPHORUS: 1.7 MG/DL (ref 2.5–4.9)
PHOSPHORUS: 2.1 MG/DL (ref 2.5–4.9)
PLATELET # BLD: 212 K/CU MM (ref 140–440)
PLATELET # BLD: 234 K/CU MM (ref 140–440)
PLATELET # BLD: 255 K/CU MM (ref 140–440)
PLATELET # BLD: 278 K/ΜL
PLATELET # BLD: 284 K/CU MM (ref 140–440)
PLATELET # BLD: 285 K/CU MM (ref 140–440)
PLATELET # BLD: 309 K/CU MM (ref 140–440)
PLATELET # BLD: 318 K/CU MM (ref 140–440)
PLATELET # BLD: 323 K/CU MM (ref 140–440)
PLATELET # BLD: 340 K/CU MM (ref 140–440)
PLATELET # BLD: 389 K/CU MM (ref 140–440)
PLATELET # BLD: 397 K/CU MM (ref 140–440)
PLT MORPHOLOGY: ABNORMAL
PMV BLD AUTO: 10 FL (ref 7.5–11.1)
PMV BLD AUTO: 10.3 FL (ref 7.5–11.1)
PMV BLD AUTO: 10.3 FL (ref 7.5–11.1)
PMV BLD AUTO: 10.6 FL (ref 7.5–11.1)
PMV BLD AUTO: 10.8 FL (ref 7.5–11.1)
PMV BLD AUTO: 10.9 FL (ref 7.5–11.1)
PMV BLD AUTO: 11 FL (ref 7.5–11.1)
PMV BLD AUTO: 8.4 FL
PMV BLD AUTO: 9.6 FL (ref 7.5–11.1)
PMV BLD AUTO: 9.9 FL (ref 7.5–11.1)
PO2, VEN: 258 MMHG (ref 28–48)
POC CREATININE: 0.8 MG/DL (ref 0.6–1.1)
POLYCHROMASIA: ABNORMAL
POLYCHROMASIA: ABNORMAL
POTASSIUM SERPL-SCNC: 3 MMOL/L (ref 3.5–5.1)
POTASSIUM SERPL-SCNC: 3.5 MMOL/L (ref 3.5–5.1)
POTASSIUM SERPL-SCNC: 3.5 MMOL/L (ref 3.5–5.1)
POTASSIUM SERPL-SCNC: 3.6 MMOL/L (ref 3.5–5.1)
POTASSIUM SERPL-SCNC: 3.8 MMOL/L (ref 3.5–5.1)
POTASSIUM SERPL-SCNC: 3.9 MMOL/L
POTASSIUM SERPL-SCNC: 4.3 MMOL/L (ref 3.5–5.1)
POTASSIUM SERPL-SCNC: 4.4 MMOL/L (ref 3.5–5.1)
POTASSIUM SERPL-SCNC: 4.5 MMOL/L (ref 3.5–5.1)
POTASSIUM SERPL-SCNC: 4.9 MMOL/L (ref 3.5–5.1)
POTASSIUM SERPL-SCNC: 4.9 MMOL/L (ref 3.5–5.1)
PRO-BNP: 1464 PG/ML
PRO-BNP: 213.6 PG/ML
PRO-BNP: 277.5 PG/ML
PRO-BNP: 706 PG/ML
PROCALCITONIN: 0.31
PROMYELOCYTES ABSOLUTE COUNT: 0.15 K/CU MM
PROMYELOCYTES ABSOLUTE COUNT: 2.59 K/CU MM
PROMYELOCYTES PERCENT: 1 %
PROMYELOCYTES PERCENT: 16 %
PROTEIN TOTAL: 6.2 G/DL
PROTEIN UA: NEGATIVE MG/DL
PROTHROMBIN TIME: 14.2 SECONDS (ref 11.7–14.5)
PROTHROMBIN TIME: 14.9 SECONDS (ref 11.7–14.5)
PROTHROMBIN TIME: 15.2 SECONDS (ref 11.7–14.5)
PROTHROMBIN TIME: 18.8 SECONDS (ref 11.7–14.5)
PROTHROMBIN TIME: 20.2 SECONDS (ref 11.7–14.5)
PROTHROMBIN TIME: 20.8 SECONDS (ref 11.7–14.5)
PROTHROMBIN TIME: 21.3 SECONDS (ref 11.7–14.5)
PROTHROMBIN TIME: 22.9 SECONDS (ref 11.7–14.5)
PROTHROMBIN TIME: 23 SECONDS (ref 11.7–14.5)
PROTHROMBIN TIME: 23.2 SECONDS (ref 11.7–14.5)
PROTHROMBIN TIME: 24.5 SECONDS (ref 11.7–14.5)
PROTHROMBIN TIME: 25.1 SECONDS (ref 11.7–14.5)
PROTHROMBIN TIME: 26.4 SECONDS (ref 11.7–14.5)
PROTHROMBIN TIME: 27.5 SECONDS (ref 11.7–14.5)
PROTHROMBIN TIME: 27.5 SECONDS (ref 11.7–14.5)
PROTHROMBIN TIME: 27.8 SECONDS (ref 11.7–14.5)
PROTHROMBIN TIME: 27.9 SECONDS (ref 11.7–14.5)
PROTHROMBIN TIME: 37.1 SECONDS (ref 11.7–14.5)
PROTHROMBIN TIME: 39.5 SECONDS (ref 11.7–14.5)
PROTHROMBIN TIME: 47 SECONDS (ref 11.7–14.5)
RAPID INFLUENZA  B AGN: NEGATIVE
RAPID INFLUENZA A AGN: NEGATIVE
RBC # BLD: 3.51 M/CU MM (ref 4.2–5.4)
RBC # BLD: 3.74 M/CU MM (ref 4.2–5.4)
RBC # BLD: 3.85 M/CU MM (ref 4.2–5.4)
RBC # BLD: 3.96 M/CU MM (ref 4.2–5.4)
RBC # BLD: 4.01 10^6/ΜL
RBC # BLD: 4.01 M/CU MM (ref 4.2–5.4)
RBC # BLD: 4.02 M/CU MM (ref 4.2–5.4)
RBC # BLD: 4.06 M/CU MM (ref 4.2–5.4)
RBC # BLD: 4.44 M/CU MM (ref 4.2–5.4)
RBC # BLD: 4.51 M/CU MM (ref 4.2–5.4)
RBC # BLD: 5.15 M/CU MM (ref 4.2–5.4)
RBC # BLD: 5.49 M/CU MM (ref 4.2–5.4)
RBC URINE: 1 /HPF (ref 0–6)
RBC URINE: 1 /HPF (ref 0–6)
RBC URINE: 2 /HPF (ref 0–6)
RBC URINE: <1 /HPF (ref 0–6)
RBC URINE: <1 /HPF (ref 0–6)
RHINOVIRUS ENTEROVIRUS PCR: NOT DETECTED
RSV PCR: NOT DETECTED
SARS-COV-2, NAAT: DETECTED
SARS-COV-2, NAAT: NOT DETECTED
SARS-COV-2: ABNORMAL
SARS-COV-2: NOT DETECTED
SEDIMENTATION RATE, ERYTHROCYTE: 34
SEGMENTED NEUTROPHILS ABSOLUTE COUNT: 11.2 K/CU MM
SEGMENTED NEUTROPHILS ABSOLUTE COUNT: 6.3 K/CU MM
SEGMENTED NEUTROPHILS ABSOLUTE COUNT: 7.5 K/CU MM
SEGMENTED NEUTROPHILS ABSOLUTE COUNT: 7.8 K/CU MM
SEGMENTED NEUTROPHILS ABSOLUTE COUNT: 8.4 K/CU MM
SEGMENTED NEUTROPHILS ABSOLUTE COUNT: 9.1 K/CU MM
SEGMENTED NEUTROPHILS ABSOLUTE COUNT: 9.8 K/CU MM
SEGMENTED NEUTROPHILS RELATIVE PERCENT: 46 % (ref 36–66)
SEGMENTED NEUTROPHILS RELATIVE PERCENT: 69.6 % (ref 36–66)
SEGMENTED NEUTROPHILS RELATIVE PERCENT: 70.5 % (ref 36–66)
SEGMENTED NEUTROPHILS RELATIVE PERCENT: 73 % (ref 36–66)
SEGMENTED NEUTROPHILS RELATIVE PERCENT: 75 % (ref 36–66)
SEGMENTED NEUTROPHILS RELATIVE PERCENT: 79 % (ref 36–66)
SEGMENTED NEUTROPHILS RELATIVE PERCENT: 89 % (ref 36–66)
SODIUM BLD-SCNC: 134 MMOL/L (ref 135–145)
SODIUM BLD-SCNC: 136 MMOL/L (ref 135–145)
SODIUM BLD-SCNC: 138 MMOL/L (ref 135–145)
SODIUM BLD-SCNC: 139 MMOL/L (ref 135–145)
SODIUM BLD-SCNC: 140 MMOL/L (ref 135–145)
SODIUM BLD-SCNC: 141 MMOL/L (ref 135–145)
SODIUM BLD-SCNC: 141 MMOL/L (ref 135–145)
SODIUM BLD-SCNC: 142 MMOL/L
SODIUM BLD-SCNC: 142 MMOL/L (ref 135–145)
SODIUM BLD-SCNC: 142 MMOL/L (ref 135–145)
SODIUM BLD-SCNC: 143 MMOL/L (ref 135–145)
SODIUM BLD-SCNC: 143 MMOL/L (ref 135–145)
SODIUM BLD-SCNC: 145 MMOL/L (ref 135–145)
SOURCE: ABNORMAL
SOURCE: NORMAL
SOURCE: NORMAL
SPECIFIC GRAVITY UA: 1.01 (ref 1–1.03)
SPECIFIC GRAVITY UA: 1.02 (ref 1–1.03)
SPECIMEN: ABNORMAL
SPECIMEN: ABNORMAL
SPECIMEN: NORMAL
SQUAMOUS EPITHELIAL: 1 /HPF
SQUAMOUS EPITHELIAL: <1 /HPF
STREP PNEUMONIAE ANTIGEN: NORMAL
TOTAL CK: 585 IU/L (ref 26–140)
TOTAL COLONY COUNT: ABNORMAL
TOTAL IMMATURE NEUTOROPHIL: 0.05 K/CU MM
TOTAL IMMATURE NEUTOROPHIL: 0.05 K/CU MM
TOTAL IMMATURE NEUTOROPHIL: 0.07 K/CU MM
TOTAL IMMATURE NEUTOROPHIL: 0.08 K/CU MM
TOTAL NUCLEATED RBC: 0 K/CU MM
TOTAL PROTEIN: 5.2 GM/DL (ref 6.4–8.2)
TOTAL PROTEIN: 5.2 GM/DL (ref 6.4–8.2)
TOTAL PROTEIN: 5.7 GM/DL (ref 6.4–8.2)
TOTAL PROTEIN: 5.9 GM/DL (ref 6.4–8.2)
TOTAL PROTEIN: 6 GM/DL (ref 6.4–8.2)
TOTAL PROTEIN: 6.2 GM/DL (ref 6.4–8.2)
TOTAL PROTEIN: 6.3 GM/DL (ref 6.4–8.2)
TOTAL PROTEIN: 6.3 GM/DL (ref 6.4–8.2)
TOTAL PROTEIN: 6.8 GM/DL (ref 6.4–8.2)
TOTAL PROTEIN: 6.9 GM/DL (ref 6.4–8.2)
TOTAL PROTEIN: 7.1 GM/DL (ref 6.4–8.2)
TOTAL PROTEIN: 7.2 GM/DL (ref 6.4–8.2)
TRICHOMONAS: ABNORMAL /HPF
TROPONIN T: <0.01 NG/ML
TSH HIGH SENSITIVITY: 3.87 UIU/ML (ref 0.27–4.2)
UROBILINOGEN, URINE: NORMAL MG/DL (ref 0.2–1)
WBC # BLD: 10.4 K/CU MM (ref 4–10.5)
WBC # BLD: 10.6 K/CU MM (ref 4–10.5)
WBC # BLD: 11.1 K/CU MM (ref 4–10.5)
WBC # BLD: 12.7 K/CU MM (ref 4–10.5)
WBC # BLD: 12.9 K/CU MM (ref 4–10.5)
WBC # BLD: 15.4 K/CU MM (ref 4–10.5)
WBC # BLD: 16.2 K/CU MM (ref 4–10.5)
WBC # BLD: 8.3 K/CU MM (ref 4–10.5)
WBC # BLD: 8.4 10^3/ML
WBC # BLD: 8.8 K/CU MM (ref 4–10.5)
WBC # BLD: 9.1 K/CU MM (ref 4–10.5)
WBC # BLD: 9.7 K/CU MM (ref 4–10.5)
WBC # BLD: ABNORMAL 10*3/UL
WBC UA: 1 /HPF (ref 0–5)
WBC UA: 2 /HPF (ref 0–5)
WBC UA: 9 /HPF (ref 0–5)
WBC UA: <1 /HPF (ref 0–5)
WBC UA: <1 /HPF (ref 0–5)

## 2020-01-01 PROCEDURE — 94761 N-INVAS EAR/PLS OXIMETRY MLT: CPT

## 2020-01-01 PROCEDURE — 1036F TOBACCO NON-USER: CPT | Performed by: INTERNAL MEDICINE

## 2020-01-01 PROCEDURE — G0378 HOSPITAL OBSERVATION PER HR: HCPCS

## 2020-01-01 PROCEDURE — 83605 ASSAY OF LACTIC ACID: CPT

## 2020-01-01 PROCEDURE — 6370000000 HC RX 637 (ALT 250 FOR IP): Performed by: NURSE PRACTITIONER

## 2020-01-01 PROCEDURE — 85610 PROTHROMBIN TIME: CPT

## 2020-01-01 PROCEDURE — 4040F PNEUMOC VAC/ADMIN/RCVD: CPT | Performed by: SURGERY

## 2020-01-01 PROCEDURE — 81001 URINALYSIS AUTO W/SCOPE: CPT

## 2020-01-01 PROCEDURE — 85025 COMPLETE CBC W/AUTO DIFF WBC: CPT

## 2020-01-01 PROCEDURE — 6360000002 HC RX W HCPCS: Performed by: OBSTETRICS & GYNECOLOGY

## 2020-01-01 PROCEDURE — 96367 TX/PROPH/DG ADDL SEQ IV INF: CPT

## 2020-01-01 PROCEDURE — 6360000002 HC RX W HCPCS: Performed by: NURSE PRACTITIONER

## 2020-01-01 PROCEDURE — 6360000002 HC RX W HCPCS: Performed by: PHYSICIAN ASSISTANT

## 2020-01-01 PROCEDURE — 97535 SELF CARE MNGMENT TRAINING: CPT

## 2020-01-01 PROCEDURE — 2709999900 HC NON-CHARGEABLE SUPPLY: Performed by: SURGERY

## 2020-01-01 PROCEDURE — U0002 COVID-19 LAB TEST NON-CDC: HCPCS

## 2020-01-01 PROCEDURE — XW033E5 INTRODUCTION OF REMDESIVIR ANTI-INFECTIVE INTO PERIPHERAL VEIN, PERCUTANEOUS APPROACH, NEW TECHNOLOGY GROUP 5: ICD-10-PCS | Performed by: INTERNAL MEDICINE

## 2020-01-01 PROCEDURE — 72170 X-RAY EXAM OF PELVIS: CPT

## 2020-01-01 PROCEDURE — 6370000000 HC RX 637 (ALT 250 FOR IP): Performed by: INTERNAL MEDICINE

## 2020-01-01 PROCEDURE — 99285 EMERGENCY DEPT VISIT HI MDM: CPT

## 2020-01-01 PROCEDURE — 73060 X-RAY EXAM OF HUMERUS: CPT

## 2020-01-01 PROCEDURE — 1123F ACP DISCUSS/DSCN MKR DOCD: CPT | Performed by: SURGERY

## 2020-01-01 PROCEDURE — 99223 1ST HOSP IP/OBS HIGH 75: CPT | Performed by: INTERNAL MEDICINE

## 2020-01-01 PROCEDURE — 99213 OFFICE O/P EST LOW 20 MIN: CPT | Performed by: SURGERY

## 2020-01-01 PROCEDURE — 1111F DSCHRG MED/CURRENT MED MERGE: CPT | Performed by: SURGERY

## 2020-01-01 PROCEDURE — 1036F TOBACCO NON-USER: CPT | Performed by: SURGERY

## 2020-01-01 PROCEDURE — 1090F PRES/ABSN URINE INCON ASSESS: CPT | Performed by: SURGERY

## 2020-01-01 PROCEDURE — 2500000003 HC RX 250 WO HCPCS: Performed by: PHYSICIAN ASSISTANT

## 2020-01-01 PROCEDURE — 99232 SBSQ HOSP IP/OBS MODERATE 35: CPT | Performed by: NURSE PRACTITIONER

## 2020-01-01 PROCEDURE — 83880 ASSAY OF NATRIURETIC PEPTIDE: CPT

## 2020-01-01 PROCEDURE — 96365 THER/PROPH/DIAG IV INF INIT: CPT

## 2020-01-01 PROCEDURE — 99231 SBSQ HOSP IP/OBS SF/LOW 25: CPT | Performed by: OBSTETRICS & GYNECOLOGY

## 2020-01-01 PROCEDURE — 6370000000 HC RX 637 (ALT 250 FOR IP): Performed by: SURGERY

## 2020-01-01 PROCEDURE — 36415 COLL VENOUS BLD VENIPUNCTURE: CPT

## 2020-01-01 PROCEDURE — 2580000003 HC RX 258: Performed by: PHYSICAL MEDICINE & REHABILITATION

## 2020-01-01 PROCEDURE — 86141 C-REACTIVE PROTEIN HS: CPT

## 2020-01-01 PROCEDURE — 85027 COMPLETE CBC AUTOMATED: CPT

## 2020-01-01 PROCEDURE — G8417 CALC BMI ABV UP PARAM F/U: HCPCS | Performed by: INTERNAL MEDICINE

## 2020-01-01 PROCEDURE — 76937 US GUIDE VASCULAR ACCESS: CPT

## 2020-01-01 PROCEDURE — 80053 COMPREHEN METABOLIC PANEL: CPT

## 2020-01-01 PROCEDURE — 1090F PRES/ABSN URINE INCON ASSESS: CPT | Performed by: INTERNAL MEDICINE

## 2020-01-01 PROCEDURE — 6360000002 HC RX W HCPCS: Performed by: PHYSICAL MEDICINE & REHABILITATION

## 2020-01-01 PROCEDURE — 82565 ASSAY OF CREATININE: CPT

## 2020-01-01 PROCEDURE — 2580000003 HC RX 258: Performed by: NURSE PRACTITIONER

## 2020-01-01 PROCEDURE — 97166 OT EVAL MOD COMPLEX 45 MIN: CPT

## 2020-01-01 PROCEDURE — 84443 ASSAY THYROID STIM HORMONE: CPT

## 2020-01-01 PROCEDURE — 71045 X-RAY EXAM CHEST 1 VIEW: CPT

## 2020-01-01 PROCEDURE — 1200000000 HC SEMI PRIVATE

## 2020-01-01 PROCEDURE — 93005 ELECTROCARDIOGRAM TRACING: CPT | Performed by: PHYSICIAN ASSISTANT

## 2020-01-01 PROCEDURE — 83735 ASSAY OF MAGNESIUM: CPT

## 2020-01-01 PROCEDURE — 86901 BLOOD TYPING SEROLOGIC RH(D): CPT

## 2020-01-01 PROCEDURE — 72131 CT LUMBAR SPINE W/O DYE: CPT

## 2020-01-01 PROCEDURE — 6360000002 HC RX W HCPCS: Performed by: INTERNAL MEDICINE

## 2020-01-01 PROCEDURE — 2580000003 HC RX 258: Performed by: HOSPITALIST

## 2020-01-01 PROCEDURE — 97162 PT EVAL MOD COMPLEX 30 MIN: CPT

## 2020-01-01 PROCEDURE — 87086 URINE CULTURE/COLONY COUNT: CPT

## 2020-01-01 PROCEDURE — 94150 VITAL CAPACITY TEST: CPT

## 2020-01-01 PROCEDURE — 10060 I&D ABSCESS SIMPLE/SINGLE: CPT | Performed by: SURGERY

## 2020-01-01 PROCEDURE — 82248 BILIRUBIN DIRECT: CPT

## 2020-01-01 PROCEDURE — 2500000003 HC RX 250 WO HCPCS: Performed by: NURSE PRACTITIONER

## 2020-01-01 PROCEDURE — 93005 ELECTROCARDIOGRAM TRACING: CPT | Performed by: EMERGENCY MEDICINE

## 2020-01-01 PROCEDURE — 97530 THERAPEUTIC ACTIVITIES: CPT

## 2020-01-01 PROCEDURE — 1250000000 HC SEMI PRIVATE HOSPICE R&B

## 2020-01-01 PROCEDURE — 6360000002 HC RX W HCPCS: Performed by: SURGERY

## 2020-01-01 PROCEDURE — 82728 ASSAY OF FERRITIN: CPT

## 2020-01-01 PROCEDURE — 1123F ACP DISCUSS/DSCN MKR DOCD: CPT | Performed by: INTERNAL MEDICINE

## 2020-01-01 PROCEDURE — 2580000003 HC RX 258: Performed by: SURGERY

## 2020-01-01 PROCEDURE — 2580000003 HC RX 258: Performed by: INTERNAL MEDICINE

## 2020-01-01 PROCEDURE — 6360000002 HC RX W HCPCS: Performed by: FAMILY MEDICINE

## 2020-01-01 PROCEDURE — 72125 CT NECK SPINE W/O DYE: CPT

## 2020-01-01 PROCEDURE — 6360000002 HC RX W HCPCS: Performed by: STUDENT IN AN ORGANIZED HEALTH CARE EDUCATION/TRAINING PROGRAM

## 2020-01-01 PROCEDURE — 2500000003 HC RX 250 WO HCPCS: Performed by: INTERNAL MEDICINE

## 2020-01-01 PROCEDURE — 6370000000 HC RX 637 (ALT 250 FOR IP): Performed by: PHYSICAL MEDICINE & REHABILITATION

## 2020-01-01 PROCEDURE — 0202U NFCT DS 22 TRGT SARS-COV-2: CPT

## 2020-01-01 PROCEDURE — 93010 ELECTROCARDIOGRAM REPORT: CPT | Performed by: INTERNAL MEDICINE

## 2020-01-01 PROCEDURE — 80048 BASIC METABOLIC PNL TOTAL CA: CPT

## 2020-01-01 PROCEDURE — 71046 X-RAY EXAM CHEST 2 VIEWS: CPT

## 2020-01-01 PROCEDURE — 99214 OFFICE O/P EST MOD 30 MIN: CPT | Performed by: SURGERY

## 2020-01-01 PROCEDURE — 85730 THROMBOPLASTIN TIME PARTIAL: CPT

## 2020-01-01 PROCEDURE — 84100 ASSAY OF PHOSPHORUS: CPT

## 2020-01-01 PROCEDURE — 85384 FIBRINOGEN ACTIVITY: CPT

## 2020-01-01 PROCEDURE — 96366 THER/PROPH/DIAG IV INF ADDON: CPT

## 2020-01-01 PROCEDURE — 1280000000 HC REHAB R&B

## 2020-01-01 PROCEDURE — 82805 BLOOD GASES W/O2 SATURATION: CPT

## 2020-01-01 PROCEDURE — 99222 1ST HOSP IP/OBS MODERATE 55: CPT | Performed by: OBSTETRICS & GYNECOLOGY

## 2020-01-01 PROCEDURE — 87186 SC STD MICRODIL/AGAR DIL: CPT

## 2020-01-01 PROCEDURE — 70450 CT HEAD/BRAIN W/O DYE: CPT

## 2020-01-01 PROCEDURE — 71275 CT ANGIOGRAPHY CHEST: CPT

## 2020-01-01 PROCEDURE — 73560 X-RAY EXAM OF KNEE 1 OR 2: CPT

## 2020-01-01 PROCEDURE — G8427 DOCREV CUR MEDS BY ELIG CLIN: HCPCS | Performed by: SURGERY

## 2020-01-01 PROCEDURE — 2580000003 HC RX 258: Performed by: STUDENT IN AN ORGANIZED HEALTH CARE EDUCATION/TRAINING PROGRAM

## 2020-01-01 PROCEDURE — 87077 CULTURE AEROBIC IDENTIFY: CPT

## 2020-01-01 PROCEDURE — 2700000000 HC OXYGEN THERAPY PER DAY

## 2020-01-01 PROCEDURE — 84300 ASSAY OF URINE SODIUM: CPT

## 2020-01-01 PROCEDURE — 97542 WHEELCHAIR MNGMENT TRAINING: CPT

## 2020-01-01 PROCEDURE — 87040 BLOOD CULTURE FOR BACTERIA: CPT

## 2020-01-01 PROCEDURE — 96375 TX/PRO/DX INJ NEW DRUG ADDON: CPT

## 2020-01-01 PROCEDURE — 97116 GAIT TRAINING THERAPY: CPT

## 2020-01-01 PROCEDURE — 4040F PNEUMOC VAC/ADMIN/RCVD: CPT | Performed by: INTERNAL MEDICINE

## 2020-01-01 PROCEDURE — 51702 INSERT TEMP BLADDER CATH: CPT

## 2020-01-01 PROCEDURE — 2720000010 HC SURG SUPPLY STERILE: Performed by: SURGERY

## 2020-01-01 PROCEDURE — 1111F DSCHRG MED/CURRENT MED MERGE: CPT | Performed by: INTERNAL MEDICINE

## 2020-01-01 PROCEDURE — 10180 I&D COMPLEX PO WOUND INFCTJ: CPT | Performed by: SURGERY

## 2020-01-01 PROCEDURE — 3600000012 HC SURGERY LEVEL 2 ADDTL 15MIN: Performed by: SURGERY

## 2020-01-01 PROCEDURE — 97110 THERAPEUTIC EXERCISES: CPT

## 2020-01-01 PROCEDURE — 6360000002 HC RX W HCPCS: Performed by: HOSPITALIST

## 2020-01-01 PROCEDURE — 80076 HEPATIC FUNCTION PANEL: CPT

## 2020-01-01 PROCEDURE — 85379 FIBRIN DEGRADATION QUANT: CPT

## 2020-01-01 PROCEDURE — 84484 ASSAY OF TROPONIN QUANT: CPT

## 2020-01-01 PROCEDURE — G8427 DOCREV CUR MEDS BY ELIG CLIN: HCPCS | Performed by: INTERNAL MEDICINE

## 2020-01-01 PROCEDURE — 99214 OFFICE O/P EST MOD 30 MIN: CPT | Performed by: INTERNAL MEDICINE

## 2020-01-01 PROCEDURE — 99221 1ST HOSP IP/OBS SF/LOW 40: CPT | Performed by: SURGERY

## 2020-01-01 PROCEDURE — 82550 ASSAY OF CK (CPK): CPT

## 2020-01-01 PROCEDURE — G8417 CALC BMI ABV UP PARAM F/U: HCPCS | Performed by: SURGERY

## 2020-01-01 PROCEDURE — 83615 LACTATE (LD) (LDH) ENZYME: CPT

## 2020-01-01 PROCEDURE — 73630 X-RAY EXAM OF FOOT: CPT

## 2020-01-01 PROCEDURE — 73720 MRI LWR EXTREMITY W/O&W/DYE: CPT

## 2020-01-01 PROCEDURE — 96374 THER/PROPH/DIAG INJ IV PUSH: CPT

## 2020-01-01 PROCEDURE — 85007 BL SMEAR W/DIFF WBC COUNT: CPT

## 2020-01-01 PROCEDURE — 6360000004 HC RX CONTRAST MEDICATION: Performed by: PODIATRIST

## 2020-01-01 PROCEDURE — 6360000002 HC RX W HCPCS: Performed by: EMERGENCY MEDICINE

## 2020-01-01 PROCEDURE — 97163 PT EVAL HIGH COMPLEX 45 MIN: CPT

## 2020-01-01 PROCEDURE — 6370000000 HC RX 637 (ALT 250 FOR IP): Performed by: PHYSICIAN ASSISTANT

## 2020-01-01 PROCEDURE — 84145 PROCALCITONIN (PCT): CPT

## 2020-01-01 PROCEDURE — 99232 SBSQ HOSP IP/OBS MODERATE 35: CPT | Performed by: SURGERY

## 2020-01-01 PROCEDURE — 2580000003 HC RX 258: Performed by: ANESTHESIOLOGY

## 2020-01-01 PROCEDURE — 84520 ASSAY OF UREA NITROGEN: CPT

## 2020-01-01 PROCEDURE — 85652 RBC SED RATE AUTOMATED: CPT

## 2020-01-01 PROCEDURE — 87071 CULTURE AEROBIC QUANT OTHER: CPT

## 2020-01-01 PROCEDURE — 99231 SBSQ HOSP IP/OBS SF/LOW 25: CPT | Performed by: SURGERY

## 2020-01-01 PROCEDURE — 2060000000 HC ICU INTERMEDIATE R&B

## 2020-01-01 PROCEDURE — 88304 TISSUE EXAM BY PATHOLOGIST: CPT

## 2020-01-01 PROCEDURE — 3700000000 HC ANESTHESIA ATTENDED CARE: Performed by: SURGERY

## 2020-01-01 PROCEDURE — 99223 1ST HOSP IP/OBS HIGH 75: CPT | Performed by: PHYSICAL MEDICINE & REHABILITATION

## 2020-01-01 PROCEDURE — 99239 HOSP IP/OBS DSCHRG MGMT >30: CPT | Performed by: PHYSICAL MEDICINE & REHABILITATION

## 2020-01-01 PROCEDURE — 73030 X-RAY EXAM OF SHOULDER: CPT

## 2020-01-01 PROCEDURE — 99211 OFF/OP EST MAY X REQ PHY/QHP: CPT

## 2020-01-01 PROCEDURE — 87449 NOS EACH ORGANISM AG IA: CPT

## 2020-01-01 PROCEDURE — 99213 OFFICE O/P EST LOW 20 MIN: CPT

## 2020-01-01 PROCEDURE — 36569 INSJ PICC 5 YR+ W/O IMAGING: CPT

## 2020-01-01 PROCEDURE — A9579 GAD-BASE MR CONTRAST NOS,1ML: HCPCS | Performed by: PODIATRIST

## 2020-01-01 PROCEDURE — 83690 ASSAY OF LIPASE: CPT

## 2020-01-01 PROCEDURE — 99232 SBSQ HOSP IP/OBS MODERATE 35: CPT | Performed by: PHYSICAL MEDICINE & REHABILITATION

## 2020-01-01 PROCEDURE — 6360000002 HC RX W HCPCS: Performed by: NURSE ANESTHETIST, CERTIFIED REGISTERED

## 2020-01-01 PROCEDURE — APPSS60 APP SPLIT SHARED TIME 46-60 MINUTES: Performed by: NURSE PRACTITIONER

## 2020-01-01 PROCEDURE — 20205 DEEP MUSCLE BIOPSY: CPT | Performed by: SURGERY

## 2020-01-01 PROCEDURE — 2580000003 HC RX 258: Performed by: PHYSICIAN ASSISTANT

## 2020-01-01 PROCEDURE — 87804 INFLUENZA ASSAY W/OPTIC: CPT

## 2020-01-01 PROCEDURE — C1751 CATH, INF, PER/CENT/MIDLINE: HCPCS

## 2020-01-01 PROCEDURE — 82570 ASSAY OF URINE CREATININE: CPT

## 2020-01-01 PROCEDURE — 93880 EXTRACRANIAL BILAT STUDY: CPT

## 2020-01-01 PROCEDURE — 02HV33Z INSERTION OF INFUSION DEVICE INTO SUPERIOR VENA CAVA, PERCUTANEOUS APPROACH: ICD-10-PCS | Performed by: SURGERY

## 2020-01-01 PROCEDURE — 6360000004 HC RX CONTRAST MEDICATION: Performed by: EMERGENCY MEDICINE

## 2020-01-01 PROCEDURE — B548ZZA ULTRASONOGRAPHY OF SUPERIOR VENA CAVA, GUIDANCE: ICD-10-PCS | Performed by: SURGERY

## 2020-01-01 PROCEDURE — 87899 AGENT NOS ASSAY W/OPTIC: CPT

## 2020-01-01 PROCEDURE — G8428 CUR MEDS NOT DOCUMENT: HCPCS | Performed by: SURGERY

## 2020-01-01 PROCEDURE — 87073 CULTURE BACTERIA ANAEROBIC: CPT

## 2020-01-01 PROCEDURE — 87081 CULTURE SCREEN ONLY: CPT

## 2020-01-01 PROCEDURE — 3600000002 HC SURGERY LEVEL 2 BASE: Performed by: SURGERY

## 2020-01-01 PROCEDURE — 87088 URINE BACTERIA CULTURE: CPT

## 2020-01-01 PROCEDURE — 3700000001 HC ADD 15 MINUTES (ANESTHESIA): Performed by: SURGERY

## 2020-01-01 PROCEDURE — 0JBR0ZX EXCISION OF LEFT FOOT SUBCUTANEOUS TISSUE AND FASCIA, OPEN APPROACH, DIAGNOSTIC: ICD-10-PCS | Performed by: SURGERY

## 2020-01-01 PROCEDURE — 99024 POSTOP FOLLOW-UP VISIT: CPT | Performed by: SURGERY

## 2020-01-01 RX ORDER — SODIUM CHLORIDE 0.9 % (FLUSH) 0.9 %
10 SYRINGE (ML) INJECTION EVERY 12 HOURS SCHEDULED
Status: DISCONTINUED | OUTPATIENT
Start: 2020-01-01 | End: 2020-01-01

## 2020-01-01 RX ORDER — AMLODIPINE BESYLATE 5 MG/1
5 TABLET ORAL DAILY
Qty: 30 TABLET | Refills: 0
Start: 2020-01-01

## 2020-01-01 RX ORDER — SODIUM CHLORIDE 0.9 % (FLUSH) 0.9 %
10 SYRINGE (ML) INJECTION PRN
Status: CANCELLED | OUTPATIENT
Start: 2020-01-01

## 2020-01-01 RX ORDER — ACETAMINOPHEN 160 MG
TABLET,DISINTEGRATING ORAL
Qty: 1 BOTTLE | Refills: 0
Start: 2020-01-01

## 2020-01-01 RX ORDER — ACETAMINOPHEN 160 MG
TABLET,DISINTEGRATING ORAL PRN
Status: DISCONTINUED | OUTPATIENT
Start: 2020-01-01 | End: 2020-01-01 | Stop reason: HOSPADM

## 2020-01-01 RX ORDER — SENNA AND DOCUSATE SODIUM 50; 8.6 MG/1; MG/1
2 TABLET, FILM COATED ORAL 2 TIMES DAILY
Status: DISCONTINUED | OUTPATIENT
Start: 2020-01-01 | End: 2020-01-01

## 2020-01-01 RX ORDER — VANCOMYCIN HYDROCHLORIDE 1 G/200ML
1000 INJECTION, SOLUTION INTRAVENOUS ONCE
Status: COMPLETED | OUTPATIENT
Start: 2020-01-01 | End: 2020-01-01

## 2020-01-01 RX ORDER — MAGNESIUM HYDROXIDE 1200 MG/15ML
LIQUID ORAL CONTINUOUS PRN
Status: COMPLETED | OUTPATIENT
Start: 2020-01-01 | End: 2020-01-01

## 2020-01-01 RX ORDER — PREGABALIN 75 MG/1
150 CAPSULE ORAL 2 TIMES DAILY
Status: DISCONTINUED | OUTPATIENT
Start: 2020-01-01 | End: 2020-01-01 | Stop reason: HOSPADM

## 2020-01-01 RX ORDER — PANTOPRAZOLE SODIUM 40 MG/1
40 TABLET, DELAYED RELEASE ORAL EVERY MORNING
Status: DISCONTINUED | OUTPATIENT
Start: 2020-01-01 | End: 2020-01-01 | Stop reason: HOSPADM

## 2020-01-01 RX ORDER — MORPHINE SULFATE 2 MG/ML
2 INJECTION, SOLUTION INTRAMUSCULAR; INTRAVENOUS
Status: CANCELLED | OUTPATIENT
Start: 2020-01-01

## 2020-01-01 RX ORDER — ACETAMINOPHEN 160 MG
TABLET,DISINTEGRATING ORAL PRN
Status: CANCELLED | OUTPATIENT
Start: 2020-01-01

## 2020-01-01 RX ORDER — ACETAMINOPHEN 650 MG
TABLET, EXTENDED RELEASE ORAL PRN
Status: DISCONTINUED | OUTPATIENT
Start: 2020-01-01 | End: 2020-01-01 | Stop reason: HOSPADM

## 2020-01-01 RX ORDER — ONDANSETRON 4 MG/1
4 TABLET, ORALLY DISINTEGRATING ORAL EVERY 8 HOURS PRN
Status: DISCONTINUED | OUTPATIENT
Start: 2020-01-01 | End: 2020-01-01 | Stop reason: HOSPADM

## 2020-01-01 RX ORDER — MORPHINE SULFATE 4 MG/ML
4 INJECTION, SOLUTION INTRAMUSCULAR; INTRAVENOUS
Status: DISCONTINUED | OUTPATIENT
Start: 2020-01-01 | End: 2020-01-01 | Stop reason: HOSPADM

## 2020-01-01 RX ORDER — ACETAMINOPHEN 650 MG
TABLET, EXTENDED RELEASE ORAL PRN
Status: CANCELLED | OUTPATIENT
Start: 2020-01-01

## 2020-01-01 RX ORDER — LIDOCAINE HYDROCHLORIDE 10 MG/ML
5 INJECTION, SOLUTION EPIDURAL; INFILTRATION; INTRACAUDAL; PERINEURAL ONCE
Status: DISCONTINUED | OUTPATIENT
Start: 2020-01-01 | End: 2020-01-01

## 2020-01-01 RX ORDER — GLYCOPYRROLATE 0.2 MG/ML
0.2 INJECTION INTRAMUSCULAR; INTRAVENOUS EVERY 4 HOURS PRN
Status: DISCONTINUED | OUTPATIENT
Start: 2020-01-01 | End: 2020-10-17 | Stop reason: HOSPADM

## 2020-01-01 RX ORDER — ACETAMINOPHEN 650 MG/1
650 SUPPOSITORY RECTAL EVERY 6 HOURS PRN
Status: DISCONTINUED | OUTPATIENT
Start: 2020-01-01 | End: 2020-10-17 | Stop reason: HOSPADM

## 2020-01-01 RX ORDER — LIDOCAINE 4 G/G
1 PATCH TOPICAL DAILY
Qty: 7 PATCH | Refills: 0 | Status: ON HOLD | OUTPATIENT
Start: 2020-01-01 | End: 2020-01-01 | Stop reason: HOSPADM

## 2020-01-01 RX ORDER — FUROSEMIDE 20 MG/1
20 TABLET ORAL DAILY
COMMUNITY

## 2020-01-01 RX ORDER — TRAMADOL HYDROCHLORIDE 50 MG/1
25 TABLET ORAL EVERY 6 HOURS PRN
Status: DISCONTINUED | OUTPATIENT
Start: 2020-01-01 | End: 2020-01-01 | Stop reason: HOSPADM

## 2020-01-01 RX ORDER — HEPARIN SODIUM 5000 [USP'U]/ML
5000 INJECTION, SOLUTION INTRAVENOUS; SUBCUTANEOUS EVERY 8 HOURS SCHEDULED
Status: DISCONTINUED | OUTPATIENT
Start: 2020-01-01 | End: 2020-01-01

## 2020-01-01 RX ORDER — PIPERACILLIN SODIUM, TAZOBACTAM SODIUM 3; .375 G/15ML; G/15ML
3.38 INJECTION, POWDER, LYOPHILIZED, FOR SOLUTION INTRAVENOUS ONCE
Status: DISCONTINUED | OUTPATIENT
Start: 2020-01-01 | End: 2020-01-01 | Stop reason: SDUPTHER

## 2020-01-01 RX ORDER — ONDANSETRON 2 MG/ML
4 INJECTION INTRAMUSCULAR; INTRAVENOUS EVERY 6 HOURS PRN
Status: DISCONTINUED | OUTPATIENT
Start: 2020-01-01 | End: 2020-01-01 | Stop reason: HOSPADM

## 2020-01-01 RX ORDER — SODIUM CHLORIDE 0.9 % (FLUSH) 0.9 %
10 SYRINGE (ML) INJECTION 2 TIMES DAILY
Status: DISCONTINUED | OUTPATIENT
Start: 2020-01-01 | End: 2020-01-01

## 2020-01-01 RX ORDER — MORPHINE SULFATE 4 MG/ML
4 INJECTION, SOLUTION INTRAMUSCULAR; INTRAVENOUS EVERY 6 HOURS SCHEDULED
Status: DISCONTINUED | OUTPATIENT
Start: 2020-01-01 | End: 2020-10-17 | Stop reason: HOSPADM

## 2020-01-01 RX ORDER — HALOPERIDOL 5 MG/ML
1 INJECTION INTRAMUSCULAR
Status: DISCONTINUED | OUTPATIENT
Start: 2020-01-01 | End: 2020-10-17 | Stop reason: HOSPADM

## 2020-01-01 RX ORDER — LACTOBACILLUS RHAMNOSUS GG 10B CELL
1 CAPSULE ORAL DAILY
Status: DISCONTINUED | OUTPATIENT
Start: 2020-01-01 | End: 2020-01-01

## 2020-01-01 RX ORDER — AMLODIPINE BESYLATE 10 MG/1
10 TABLET ORAL DAILY
Status: DISCONTINUED | OUTPATIENT
Start: 2020-01-01 | End: 2020-01-01 | Stop reason: HOSPADM

## 2020-01-01 RX ORDER — MORPHINE SULFATE 2 MG/ML
1 INJECTION, SOLUTION INTRAMUSCULAR; INTRAVENOUS EVERY 4 HOURS PRN
Status: DISCONTINUED | OUTPATIENT
Start: 2020-01-01 | End: 2020-01-01

## 2020-01-01 RX ORDER — SODIUM CHLORIDE 0.9 % (FLUSH) 0.9 %
10 SYRINGE (ML) INJECTION EVERY 12 HOURS SCHEDULED
Status: DISCONTINUED | OUTPATIENT
Start: 2020-01-01 | End: 2020-01-01 | Stop reason: HOSPADM

## 2020-01-01 RX ORDER — FUROSEMIDE 40 MG/1
80 TABLET ORAL EVERY MORNING
Status: DISCONTINUED | OUTPATIENT
Start: 2020-01-01 | End: 2020-01-01 | Stop reason: HOSPADM

## 2020-01-01 RX ORDER — SENNA AND DOCUSATE SODIUM 50; 8.6 MG/1; MG/1
1 TABLET, FILM COATED ORAL DAILY
Status: DISCONTINUED | OUTPATIENT
Start: 2020-01-01 | End: 2020-01-01

## 2020-01-01 RX ORDER — FUROSEMIDE 20 MG/1
20 TABLET ORAL DAILY
Status: DISCONTINUED | OUTPATIENT
Start: 2020-01-01 | End: 2020-01-01

## 2020-01-01 RX ORDER — POLYETHYLENE GLYCOL 3350 17 G/17G
17 POWDER, FOR SOLUTION ORAL DAILY PRN
Status: DISCONTINUED | OUTPATIENT
Start: 2020-01-01 | End: 2020-01-01 | Stop reason: HOSPADM

## 2020-01-01 RX ORDER — LORAZEPAM 2 MG/ML
0.5 INJECTION INTRAMUSCULAR
Status: DISCONTINUED | OUTPATIENT
Start: 2020-01-01 | End: 2020-10-17 | Stop reason: HOSPADM

## 2020-01-01 RX ORDER — MORPHINE SULFATE 4 MG/ML
4 INJECTION, SOLUTION INTRAMUSCULAR; INTRAVENOUS
Status: CANCELLED | OUTPATIENT
Start: 2020-01-01

## 2020-01-01 RX ORDER — ZOLEDRONIC ACID 5 MG/100ML
5 INJECTION, SOLUTION INTRAVENOUS ONCE
Status: DISCONTINUED | OUTPATIENT
Start: 2020-01-01 | End: 2020-01-01

## 2020-01-01 RX ORDER — MORPHINE SULFATE 2 MG/ML
2 INJECTION, SOLUTION INTRAMUSCULAR; INTRAVENOUS
Status: DISCONTINUED | OUTPATIENT
Start: 2020-01-01 | End: 2020-10-17 | Stop reason: HOSPADM

## 2020-01-01 RX ORDER — MORPHINE SULFATE 4 MG/ML
4 INJECTION, SOLUTION INTRAMUSCULAR; INTRAVENOUS
Status: DISCONTINUED | OUTPATIENT
Start: 2020-01-01 | End: 2020-01-01

## 2020-01-01 RX ORDER — MORPHINE SULFATE 2 MG/ML
2 INJECTION, SOLUTION INTRAMUSCULAR; INTRAVENOUS
Status: DISCONTINUED | OUTPATIENT
Start: 2020-01-01 | End: 2020-01-01

## 2020-01-01 RX ORDER — PANTOPRAZOLE SODIUM 40 MG/1
40 TABLET, DELAYED RELEASE ORAL EVERY MORNING
Status: DISCONTINUED | OUTPATIENT
Start: 2020-01-01 | End: 2020-01-01

## 2020-01-01 RX ORDER — GLYCOPYRROLATE 0.2 MG/ML
0.2 INJECTION INTRAMUSCULAR; INTRAVENOUS EVERY 4 HOURS PRN
Status: DISCONTINUED | OUTPATIENT
Start: 2020-01-01 | End: 2020-01-01 | Stop reason: HOSPADM

## 2020-01-01 RX ORDER — 0.9 % SODIUM CHLORIDE 0.9 %
30 INTRAVENOUS SOLUTION INTRAVENOUS ONCE
Status: COMPLETED | OUTPATIENT
Start: 2020-01-01 | End: 2020-01-01

## 2020-01-01 RX ORDER — HYDROCHLOROTHIAZIDE 12.5 MG/1
12.5 TABLET ORAL DAILY
Status: DISCONTINUED | OUTPATIENT
Start: 2020-01-01 | End: 2020-01-01

## 2020-01-01 RX ORDER — METHYLPREDNISOLONE SODIUM SUCCINATE 40 MG/ML
40 INJECTION, POWDER, LYOPHILIZED, FOR SOLUTION INTRAMUSCULAR; INTRAVENOUS DAILY
Status: DISCONTINUED | OUTPATIENT
Start: 2020-01-01 | End: 2020-01-01

## 2020-01-01 RX ORDER — ACETAMINOPHEN 500 MG
1000 TABLET ORAL EVERY 6 HOURS PRN
Status: DISCONTINUED | OUTPATIENT
Start: 2020-01-01 | End: 2020-01-01 | Stop reason: HOSPADM

## 2020-01-01 RX ORDER — ACETAMINOPHEN 650 MG/1
650 SUPPOSITORY RECTAL EVERY 6 HOURS PRN
Status: CANCELLED | OUTPATIENT
Start: 2020-01-01

## 2020-01-01 RX ORDER — CEPHALEXIN 500 MG/1
500 CAPSULE ORAL 4 TIMES DAILY
Qty: 20 CAPSULE | Refills: 0 | Status: SHIPPED | OUTPATIENT
Start: 2020-01-01 | End: 2020-01-01

## 2020-01-01 RX ORDER — 0.9 % SODIUM CHLORIDE 0.9 %
30 INTRAVENOUS SOLUTION INTRAVENOUS PRN
Status: DISCONTINUED | OUTPATIENT
Start: 2020-01-01 | End: 2020-01-01

## 2020-01-01 RX ORDER — DOCUSATE SODIUM 100 MG/1
100 CAPSULE, LIQUID FILLED ORAL 2 TIMES DAILY
Status: DISCONTINUED | OUTPATIENT
Start: 2020-01-01 | End: 2020-01-01 | Stop reason: HOSPADM

## 2020-01-01 RX ORDER — OXYCODONE HYDROCHLORIDE AND ACETAMINOPHEN 5; 325 MG/1; MG/1
1 TABLET ORAL EVERY 4 HOURS PRN
Status: DISCONTINUED | OUTPATIENT
Start: 2020-01-01 | End: 2020-01-01 | Stop reason: HOSPADM

## 2020-01-01 RX ORDER — PREGABALIN 75 MG/1
150 CAPSULE ORAL 2 TIMES DAILY
Status: CANCELLED | OUTPATIENT
Start: 2020-01-01

## 2020-01-01 RX ORDER — ACETAMINOPHEN 650 MG/1
650 SUPPOSITORY RECTAL EVERY 6 HOURS PRN
Status: DISCONTINUED | OUTPATIENT
Start: 2020-01-01 | End: 2020-01-01

## 2020-01-01 RX ORDER — MORPHINE SULFATE 2 MG/ML
2 INJECTION, SOLUTION INTRAMUSCULAR; INTRAVENOUS
Status: DISCONTINUED | OUTPATIENT
Start: 2020-01-01 | End: 2020-01-01 | Stop reason: HOSPADM

## 2020-01-01 RX ORDER — HYDROCHLOROTHIAZIDE 12.5 MG/1
12.5 TABLET ORAL DAILY
COMMUNITY

## 2020-01-01 RX ORDER — WARFARIN SODIUM 5 MG/1
5 TABLET ORAL DAILY
Status: DISCONTINUED | OUTPATIENT
Start: 2020-01-01 | End: 2020-01-01 | Stop reason: HOSPADM

## 2020-01-01 RX ORDER — AMLODIPINE BESYLATE 5 MG/1
5 TABLET ORAL DAILY
Status: CANCELLED | OUTPATIENT
Start: 2020-01-01

## 2020-01-01 RX ORDER — ATENOLOL 25 MG/1
25 TABLET ORAL EVERY MORNING
Status: DISCONTINUED | OUTPATIENT
Start: 2020-01-01 | End: 2020-01-01 | Stop reason: HOSPADM

## 2020-01-01 RX ORDER — SODIUM CHLORIDE, SODIUM LACTATE, POTASSIUM CHLORIDE, CALCIUM CHLORIDE 600; 310; 30; 20 MG/100ML; MG/100ML; MG/100ML; MG/100ML
INJECTION, SOLUTION INTRAVENOUS CONTINUOUS
Status: DISCONTINUED | OUTPATIENT
Start: 2020-01-01 | End: 2020-01-01

## 2020-01-01 RX ORDER — ACETAMINOPHEN 80 MG
TABLET,CHEWABLE ORAL
Status: DISPENSED
Start: 2020-01-01 | End: 2020-01-01

## 2020-01-01 RX ORDER — LACTOBACILLUS RHAMNOSUS GG 10B CELL
1 CAPSULE ORAL DAILY
COMMUNITY
Start: 2020-01-01

## 2020-01-01 RX ORDER — ACETAMINOPHEN 650 MG
TABLET, EXTENDED RELEASE ORAL
Qty: 1 BOTTLE | Refills: 0
Start: 2020-01-01 | End: 2020-01-01

## 2020-01-01 RX ORDER — PREGABALIN 75 MG/1
150 CAPSULE ORAL 2 TIMES DAILY
Status: DISCONTINUED | OUTPATIENT
Start: 2020-01-01 | End: 2020-01-01

## 2020-01-01 RX ORDER — LIDOCAINE 4 G/G
1 PATCH TOPICAL DAILY
Status: DISCONTINUED | OUTPATIENT
Start: 2020-01-01 | End: 2020-01-01 | Stop reason: HOSPADM

## 2020-01-01 RX ORDER — WARFARIN SODIUM 2.5 MG/1
5 TABLET ORAL DAILY
Status: DISCONTINUED | OUTPATIENT
Start: 2020-01-01 | End: 2020-01-01 | Stop reason: HOSPADM

## 2020-01-01 RX ORDER — DEXAMETHASONE SODIUM PHOSPHATE 4 MG/ML
6 INJECTION, SOLUTION INTRA-ARTICULAR; INTRALESIONAL; INTRAMUSCULAR; INTRAVENOUS; SOFT TISSUE DAILY
Status: DISCONTINUED | OUTPATIENT
Start: 2020-01-01 | End: 2020-01-01

## 2020-01-01 RX ORDER — LORAZEPAM 2 MG/ML
0.5 INJECTION INTRAMUSCULAR
Status: DISCONTINUED | OUTPATIENT
Start: 2020-01-01 | End: 2020-01-01 | Stop reason: HOSPADM

## 2020-01-01 RX ORDER — TRAMADOL HYDROCHLORIDE 50 MG/1
50 TABLET ORAL ONCE
Status: COMPLETED | OUTPATIENT
Start: 2020-01-01 | End: 2020-01-01

## 2020-01-01 RX ORDER — ACETAMINOPHEN 325 MG/1
650 TABLET ORAL EVERY 4 HOURS PRN
Status: DISCONTINUED | OUTPATIENT
Start: 2020-01-01 | End: 2020-01-01 | Stop reason: HOSPADM

## 2020-01-01 RX ORDER — AMLODIPINE BESYLATE 5 MG/1
5 TABLET ORAL DAILY
Status: DISCONTINUED | OUTPATIENT
Start: 2020-01-01 | End: 2020-01-01 | Stop reason: HOSPADM

## 2020-01-01 RX ORDER — SENNA AND DOCUSATE SODIUM 50; 8.6 MG/1; MG/1
1 TABLET, FILM COATED ORAL DAILY
Status: DISCONTINUED | OUTPATIENT
Start: 2020-01-01 | End: 2020-01-01 | Stop reason: HOSPADM

## 2020-01-01 RX ORDER — AMLODIPINE BESYLATE 5 MG/1
5 TABLET ORAL DAILY
Status: DISCONTINUED | OUTPATIENT
Start: 2020-01-01 | End: 2020-01-01

## 2020-01-01 RX ORDER — LOPERAMIDE HYDROCHLORIDE 2 MG/1
2 CAPSULE ORAL 4 TIMES DAILY PRN
Status: DISCONTINUED | OUTPATIENT
Start: 2020-01-01 | End: 2020-01-01 | Stop reason: HOSPADM

## 2020-01-01 RX ORDER — SENNA AND DOCUSATE SODIUM 50; 8.6 MG/1; MG/1
2 TABLET, FILM COATED ORAL 2 TIMES DAILY
Status: CANCELLED | OUTPATIENT
Start: 2020-01-01

## 2020-01-01 RX ORDER — LIDOCAINE HYDROCHLORIDE 10 MG/ML
5 INJECTION, SOLUTION EPIDURAL; INFILTRATION; INTRACAUDAL; PERINEURAL ONCE
Status: COMPLETED | OUTPATIENT
Start: 2020-01-01 | End: 2020-01-01

## 2020-01-01 RX ORDER — ACETAMINOPHEN 325 MG/1
650 TABLET ORAL EVERY 6 HOURS PRN
Status: DISCONTINUED | OUTPATIENT
Start: 2020-01-01 | End: 2020-01-01

## 2020-01-01 RX ORDER — LORAZEPAM 2 MG/ML
1 INJECTION INTRAMUSCULAR ONCE
Status: COMPLETED | OUTPATIENT
Start: 2020-01-01 | End: 2020-01-01

## 2020-01-01 RX ORDER — TRAMADOL HYDROCHLORIDE 50 MG/1
25 TABLET ORAL EVERY 6 HOURS PRN
Qty: 6 TABLET | Refills: 0 | Status: SHIPPED | OUTPATIENT
Start: 2020-01-01 | End: 2020-01-01

## 2020-01-01 RX ORDER — DEXAMETHASONE SODIUM PHOSPHATE 10 MG/ML
8 INJECTION, SOLUTION INTRAMUSCULAR; INTRAVENOUS EVERY 6 HOURS
Status: DISCONTINUED | OUTPATIENT
Start: 2020-01-01 | End: 2020-01-01 | Stop reason: ALTCHOICE

## 2020-01-01 RX ORDER — LACTOBACILLUS RHAMNOSUS GG 10B CELL
1 CAPSULE ORAL DAILY
Status: DISCONTINUED | OUTPATIENT
Start: 2020-01-01 | End: 2020-01-01 | Stop reason: HOSPADM

## 2020-01-01 RX ORDER — SODIUM CHLORIDE 0.9 % (FLUSH) 0.9 %
10 SYRINGE (ML) INJECTION PRN
Status: DISCONTINUED | OUTPATIENT
Start: 2020-01-01 | End: 2020-01-01 | Stop reason: HOSPADM

## 2020-01-01 RX ORDER — BISACODYL 10 MG
10 SUPPOSITORY, RECTAL RECTAL DAILY PRN
Status: DISCONTINUED | OUTPATIENT
Start: 2020-01-01 | End: 2020-01-01 | Stop reason: HOSPADM

## 2020-01-01 RX ORDER — LIDOCAINE HYDROCHLORIDE 20 MG/ML
INJECTION, SOLUTION INTRAVENOUS PRN
Status: DISCONTINUED | OUTPATIENT
Start: 2020-01-01 | End: 2020-01-01 | Stop reason: SDUPTHER

## 2020-01-01 RX ORDER — CEPHALEXIN 250 MG/1
250 CAPSULE ORAL 4 TIMES DAILY
Qty: 28 CAPSULE | Refills: 0 | Status: SHIPPED | OUTPATIENT
Start: 2020-01-01 | End: 2020-01-01

## 2020-01-01 RX ORDER — PANTOPRAZOLE SODIUM 40 MG/1
40 TABLET, DELAYED RELEASE ORAL EVERY MORNING
Status: CANCELLED | OUTPATIENT
Start: 2020-01-01

## 2020-01-01 RX ORDER — LORAZEPAM 2 MG/ML
0.5 INJECTION INTRAMUSCULAR
Status: CANCELLED | OUTPATIENT
Start: 2020-01-01

## 2020-01-01 RX ORDER — GREEN TEA/HOODIA GORDONII 315-12.5MG
1 CAPSULE ORAL 3 TIMES DAILY
Qty: 90 TABLET | Refills: 0 | Status: SHIPPED | OUTPATIENT
Start: 2020-01-01 | End: 2020-01-01

## 2020-01-01 RX ORDER — CEPHALEXIN 250 MG/1
250 CAPSULE ORAL EVERY 6 HOURS SCHEDULED
Status: DISCONTINUED | OUTPATIENT
Start: 2020-01-01 | End: 2020-01-01 | Stop reason: HOSPADM

## 2020-01-01 RX ORDER — HALOPERIDOL 5 MG/ML
1 INJECTION INTRAMUSCULAR
Status: DISCONTINUED | OUTPATIENT
Start: 2020-01-01 | End: 2020-01-01 | Stop reason: HOSPADM

## 2020-01-01 RX ORDER — SENNA AND DOCUSATE SODIUM 50; 8.6 MG/1; MG/1
2 TABLET, FILM COATED ORAL 2 TIMES DAILY
Status: DISCONTINUED | OUTPATIENT
Start: 2020-01-01 | End: 2020-01-01 | Stop reason: HOSPADM

## 2020-01-01 RX ORDER — SENNA AND DOCUSATE SODIUM 50; 8.6 MG/1; MG/1
1 TABLET, FILM COATED ORAL DAILY
COMMUNITY
Start: 2020-01-01

## 2020-01-01 RX ORDER — HALOPERIDOL 5 MG/ML
1 INJECTION INTRAMUSCULAR
Status: CANCELLED | OUTPATIENT
Start: 2020-01-01

## 2020-01-01 RX ORDER — SODIUM CHLORIDE 0.9 % (FLUSH) 0.9 %
10 SYRINGE (ML) INJECTION PRN
Status: DISCONTINUED | OUTPATIENT
Start: 2020-01-01 | End: 2020-10-17 | Stop reason: HOSPADM

## 2020-01-01 RX ORDER — CEFAZOLIN SODIUM 1 G/50ML
1 INJECTION, SOLUTION INTRAVENOUS EVERY 8 HOURS
Status: DISCONTINUED | OUTPATIENT
Start: 2020-01-01 | End: 2020-01-01 | Stop reason: HOSPADM

## 2020-01-01 RX ORDER — GLYCOPYRROLATE 0.2 MG/ML
0.2 INJECTION INTRAMUSCULAR; INTRAVENOUS EVERY 4 HOURS PRN
Status: CANCELLED | OUTPATIENT
Start: 2020-01-01

## 2020-01-01 RX ORDER — LORAZEPAM 2 MG/ML
1 INJECTION INTRAMUSCULAR EVERY 6 HOURS PRN
Status: DISCONTINUED | OUTPATIENT
Start: 2020-01-01 | End: 2020-01-01

## 2020-01-01 RX ORDER — PREGABALIN 150 MG/1
150 CAPSULE ORAL 2 TIMES DAILY
Qty: 60 CAPSULE | Refills: 0 | Status: SHIPPED | OUTPATIENT
Start: 2020-01-01 | End: 2022-10-29

## 2020-01-01 RX ORDER — ACETAMINOPHEN 650 MG/1
650 SUPPOSITORY RECTAL EVERY 6 HOURS PRN
Status: DISCONTINUED | OUTPATIENT
Start: 2020-01-01 | End: 2020-01-01 | Stop reason: HOSPADM

## 2020-01-01 RX ORDER — POLYETHYLENE GLYCOL 3350 17 G/17G
17 POWDER, FOR SOLUTION ORAL DAILY PRN
Status: CANCELLED | OUTPATIENT
Start: 2020-01-01

## 2020-01-01 RX ORDER — SODIUM CHLORIDE 0.9 % (FLUSH) 0.9 %
10 SYRINGE (ML) INJECTION PRN
Status: DISCONTINUED | OUTPATIENT
Start: 2020-01-01 | End: 2020-01-01

## 2020-01-01 RX ORDER — MORPHINE SULFATE 4 MG/ML
4 INJECTION, SOLUTION INTRAMUSCULAR; INTRAVENOUS
Status: DISCONTINUED | OUTPATIENT
Start: 2020-01-01 | End: 2020-10-17 | Stop reason: HOSPADM

## 2020-01-01 RX ORDER — LORAZEPAM 2 MG/ML
1 INJECTION INTRAMUSCULAR EVERY 8 HOURS SCHEDULED
Status: DISCONTINUED | OUTPATIENT
Start: 2020-01-01 | End: 2020-10-17 | Stop reason: HOSPADM

## 2020-01-01 RX ORDER — ACETAMINOPHEN 500 MG
1000 TABLET ORAL EVERY 6 HOURS PRN
Status: DISCONTINUED | OUTPATIENT
Start: 2020-01-01 | End: 2020-01-01

## 2020-01-01 RX ORDER — PROPOFOL 10 MG/ML
INJECTION, EMULSION INTRAVENOUS PRN
Status: DISCONTINUED | OUTPATIENT
Start: 2020-01-01 | End: 2020-01-01 | Stop reason: SDUPTHER

## 2020-01-01 RX ADMIN — PANTOPRAZOLE SODIUM 40 MG: 40 TABLET, DELAYED RELEASE ORAL at 06:38

## 2020-01-01 RX ADMIN — SENNOSIDES AND DOCUSATE SODIUM 1 TABLET: 8.6; 5 TABLET ORAL at 08:25

## 2020-01-01 RX ADMIN — PIPERACILLIN AND TAZOBACTAM 3.38 G: 3; .375 INJECTION, POWDER, LYOPHILIZED, FOR SOLUTION INTRAVENOUS at 17:03

## 2020-01-01 RX ADMIN — WARFARIN SODIUM 5 MG: 2.5 TABLET ORAL at 17:41

## 2020-01-01 RX ADMIN — CEFEPIME HYDROCHLORIDE 2 G: 2 INJECTION, POWDER, FOR SOLUTION INTRAVENOUS at 23:13

## 2020-01-01 RX ADMIN — PREGABALIN 150 MG: 75 CAPSULE ORAL at 20:11

## 2020-01-01 RX ADMIN — AMLODIPINE BESYLATE 5 MG: 5 TABLET ORAL at 09:39

## 2020-01-01 RX ADMIN — SODIUM CHLORIDE, PRESERVATIVE FREE 10 ML: 5 INJECTION INTRAVENOUS at 08:42

## 2020-01-01 RX ADMIN — ACETAMINOPHEN 1000 MG: 500 TABLET ORAL at 08:46

## 2020-01-01 RX ADMIN — PROPOFOL 50 MG: 10 INJECTION, EMULSION INTRAVENOUS at 14:38

## 2020-01-01 RX ADMIN — CEFEPIME HYDROCHLORIDE 1 G: 1 INJECTION, POWDER, FOR SOLUTION INTRAMUSCULAR; INTRAVENOUS at 01:48

## 2020-01-01 RX ADMIN — PREGABALIN 150 MG: 75 CAPSULE ORAL at 21:07

## 2020-01-01 RX ADMIN — CEFEPIME HYDROCHLORIDE 1 G: 1 INJECTION, POWDER, FOR SOLUTION INTRAMUSCULAR; INTRAVENOUS at 00:46

## 2020-01-01 RX ADMIN — PANTOPRAZOLE SODIUM 40 MG: 40 TABLET, DELAYED RELEASE ORAL at 06:20

## 2020-01-01 RX ADMIN — AMLODIPINE BESYLATE 5 MG: 5 TABLET ORAL at 08:25

## 2020-01-01 RX ADMIN — MORPHINE SULFATE 2 MG: 2 INJECTION, SOLUTION INTRAMUSCULAR; INTRAVENOUS at 10:17

## 2020-01-01 RX ADMIN — HYDRALAZINE HYDROCHLORIDE 10 MG: 20 INJECTION INTRAMUSCULAR; INTRAVENOUS at 18:54

## 2020-01-01 RX ADMIN — LOPERAMIDE HYDROCHLORIDE 2 MG: 2 CAPSULE ORAL at 09:50

## 2020-01-01 RX ADMIN — MORPHINE SULFATE 4 MG: 4 INJECTION, SOLUTION INTRAMUSCULAR; INTRAVENOUS at 18:22

## 2020-01-01 RX ADMIN — PIPERACILLIN AND TAZOBACTAM 3.38 G: 3; .375 INJECTION, POWDER, LYOPHILIZED, FOR SOLUTION INTRAVENOUS at 09:36

## 2020-01-01 RX ADMIN — LORAZEPAM 1 MG: 2 INJECTION INTRAMUSCULAR; INTRAVENOUS at 09:08

## 2020-01-01 RX ADMIN — AMLODIPINE BESYLATE 5 MG: 5 TABLET ORAL at 09:38

## 2020-01-01 RX ADMIN — ENOXAPARIN SODIUM 40 MG: 40 INJECTION SUBCUTANEOUS at 10:05

## 2020-01-01 RX ADMIN — SODIUM CHLORIDE, PRESERVATIVE FREE 10 ML: 5 INJECTION INTRAVENOUS at 17:41

## 2020-01-01 RX ADMIN — ENOXAPARIN SODIUM 40 MG: 40 INJECTION SUBCUTANEOUS at 09:06

## 2020-01-01 RX ADMIN — LORAZEPAM 1 MG: 2 INJECTION INTRAMUSCULAR; INTRAVENOUS at 13:40

## 2020-01-01 RX ADMIN — METHYLPREDNISOLONE SODIUM SUCCINATE 40 MG: 40 INJECTION, POWDER, FOR SOLUTION INTRAMUSCULAR; INTRAVENOUS at 10:04

## 2020-01-01 RX ADMIN — PIPERACILLIN AND TAZOBACTAM 3.38 G: 3; .375 INJECTION, POWDER, LYOPHILIZED, FOR SOLUTION INTRAVENOUS at 09:37

## 2020-01-01 RX ADMIN — Medication 1 CAPSULE: at 08:42

## 2020-01-01 RX ADMIN — DOCUSATE SODIUM 100 MG: 100 CAPSULE, LIQUID FILLED ORAL at 10:11

## 2020-01-01 RX ADMIN — LORAZEPAM 0.5 MG: 2 INJECTION INTRAMUSCULAR; INTRAVENOUS at 18:22

## 2020-01-01 RX ADMIN — PREGABALIN 150 MG: 75 CAPSULE ORAL at 21:34

## 2020-01-01 RX ADMIN — ENOXAPARIN SODIUM 40 MG: 100 INJECTION SUBCUTANEOUS at 09:58

## 2020-01-01 RX ADMIN — PIPERACILLIN AND TAZOBACTAM 4.5 G: 4; .5 INJECTION, POWDER, LYOPHILIZED, FOR SOLUTION INTRAVENOUS at 21:41

## 2020-01-01 RX ADMIN — SODIUM CHLORIDE, PRESERVATIVE FREE 10 ML: 5 INJECTION INTRAVENOUS at 21:36

## 2020-01-01 RX ADMIN — AMLODIPINE BESYLATE 5 MG: 5 TABLET ORAL at 09:06

## 2020-01-01 RX ADMIN — PIPERACILLIN AND TAZOBACTAM 4.5 G: 4; .5 INJECTION, POWDER, LYOPHILIZED, FOR SOLUTION INTRAVENOUS at 16:22

## 2020-01-01 RX ADMIN — PIPERACILLIN AND TAZOBACTAM 3.38 G: 3; .375 INJECTION, POWDER, LYOPHILIZED, FOR SOLUTION INTRAVENOUS at 17:41

## 2020-01-01 RX ADMIN — MORPHINE SULFATE 4 MG: 4 INJECTION, SOLUTION INTRAMUSCULAR; INTRAVENOUS at 21:42

## 2020-01-01 RX ADMIN — SODIUM CHLORIDE, PRESERVATIVE FREE 10 ML: 5 INJECTION INTRAVENOUS at 20:47

## 2020-01-01 RX ADMIN — MORPHINE SULFATE 2 MG: 2 INJECTION, SOLUTION INTRAMUSCULAR; INTRAVENOUS at 10:10

## 2020-01-01 RX ADMIN — PANTOPRAZOLE SODIUM 40 MG: 40 TABLET, DELAYED RELEASE ORAL at 05:23

## 2020-01-01 RX ADMIN — ENOXAPARIN SODIUM 40 MG: 40 INJECTION SUBCUTANEOUS at 09:39

## 2020-01-01 RX ADMIN — REMDESIVIR 100 MG: 100 INJECTION, POWDER, LYOPHILIZED, FOR SOLUTION INTRAVENOUS at 14:17

## 2020-01-01 RX ADMIN — LORAZEPAM 0.5 MG: 2 INJECTION INTRAMUSCULAR; INTRAVENOUS at 23:54

## 2020-01-01 RX ADMIN — PANTOPRAZOLE SODIUM 40 MG: 40 TABLET, DELAYED RELEASE ORAL at 06:00

## 2020-01-01 RX ADMIN — Medication 1 CAPSULE: at 09:50

## 2020-01-01 RX ADMIN — CEFEPIME HYDROCHLORIDE 2 G: 2 INJECTION, POWDER, FOR SOLUTION INTRAVENOUS at 23:50

## 2020-01-01 RX ADMIN — HEPARIN SODIUM 5000 UNITS: 5000 INJECTION, SOLUTION INTRAVENOUS; SUBCUTANEOUS at 01:37

## 2020-01-01 RX ADMIN — MORPHINE SULFATE 2 MG: 2 INJECTION, SOLUTION INTRAMUSCULAR; INTRAVENOUS at 10:14

## 2020-01-01 RX ADMIN — MORPHINE SULFATE 2 MG: 2 INJECTION, SOLUTION INTRAMUSCULAR; INTRAVENOUS at 23:50

## 2020-01-01 RX ADMIN — LORAZEPAM 1 MG: 2 INJECTION INTRAMUSCULAR; INTRAVENOUS at 00:47

## 2020-01-01 RX ADMIN — GADOTERIDOL 20 ML: 279.3 INJECTION, SOLUTION INTRAVENOUS at 11:40

## 2020-01-01 RX ADMIN — ENOXAPARIN SODIUM 40 MG: 40 INJECTION SUBCUTANEOUS at 08:42

## 2020-01-01 RX ADMIN — PANTOPRAZOLE SODIUM 40 MG: 40 TABLET, DELAYED RELEASE ORAL at 05:47

## 2020-01-01 RX ADMIN — ACETAMINOPHEN 650 MG: 325 TABLET ORAL at 08:29

## 2020-01-01 RX ADMIN — Medication 1 CAPSULE: at 07:52

## 2020-01-01 RX ADMIN — LORAZEPAM 1 MG: 2 INJECTION INTRAMUSCULAR; INTRAVENOUS at 03:01

## 2020-01-01 RX ADMIN — SENNOSIDES AND DOCUSATE SODIUM 2 TABLET: 8.6; 5 TABLET ORAL at 21:07

## 2020-01-01 RX ADMIN — SODIUM CHLORIDE, PRESERVATIVE FREE 10 ML: 5 INJECTION INTRAVENOUS at 10:12

## 2020-01-01 RX ADMIN — SODIUM CHLORIDE, PRESERVATIVE FREE 10 ML: 5 INJECTION INTRAVENOUS at 09:47

## 2020-01-01 RX ADMIN — PIPERACILLIN AND TAZOBACTAM 3.38 G: 3; .375 INJECTION, POWDER, LYOPHILIZED, FOR SOLUTION INTRAVENOUS at 01:36

## 2020-01-01 RX ADMIN — AMLODIPINE BESYLATE 10 MG: 10 TABLET ORAL at 08:47

## 2020-01-01 RX ADMIN — LORAZEPAM 1 MG: 2 INJECTION INTRAMUSCULAR; INTRAVENOUS at 10:11

## 2020-01-01 RX ADMIN — Medication 1 CAPSULE: at 08:37

## 2020-01-01 RX ADMIN — ATENOLOL 25 MG: 25 TABLET ORAL at 10:11

## 2020-01-01 RX ADMIN — PREGABALIN 150 MG: 75 CAPSULE ORAL at 20:52

## 2020-01-01 RX ADMIN — CEFAZOLIN SODIUM 1 G: 1 INJECTION, SOLUTION INTRAVENOUS at 10:18

## 2020-01-01 RX ADMIN — SODIUM CHLORIDE, PRESERVATIVE FREE 10 ML: 5 INJECTION INTRAVENOUS at 20:36

## 2020-01-01 RX ADMIN — PIPERACILLIN AND TAZOBACTAM 3.38 G: 3; .375 INJECTION, POWDER, LYOPHILIZED, FOR SOLUTION INTRAVENOUS at 09:11

## 2020-01-01 RX ADMIN — ENOXAPARIN SODIUM 40 MG: 40 INJECTION SUBCUTANEOUS at 09:37

## 2020-01-01 RX ADMIN — SODIUM CHLORIDE, PRESERVATIVE FREE 10 ML: 5 INJECTION INTRAVENOUS at 21:34

## 2020-01-01 RX ADMIN — LORAZEPAM 1 MG: 2 INJECTION INTRAMUSCULAR; INTRAVENOUS at 03:31

## 2020-01-01 RX ADMIN — LORAZEPAM 1 MG: 2 INJECTION INTRAMUSCULAR; INTRAVENOUS at 10:04

## 2020-01-01 RX ADMIN — ACETAMINOPHEN 650 MG: 325 TABLET ORAL at 09:09

## 2020-01-01 RX ADMIN — MORPHINE SULFATE 2 MG: 2 INJECTION, SOLUTION INTRAMUSCULAR; INTRAVENOUS at 20:30

## 2020-01-01 RX ADMIN — CEFEPIME HYDROCHLORIDE 2 G: 2 INJECTION, POWDER, FOR SOLUTION INTRAVENOUS at 12:13

## 2020-01-01 RX ADMIN — LORAZEPAM 1 MG: 2 INJECTION INTRAMUSCULAR; INTRAVENOUS at 14:50

## 2020-01-01 RX ADMIN — DEXAMETHASONE SODIUM PHOSPHATE 6 MG: 4 INJECTION, SOLUTION INTRAMUSCULAR; INTRAVENOUS at 09:08

## 2020-01-01 RX ADMIN — ENOXAPARIN SODIUM 40 MG: 40 INJECTION SUBCUTANEOUS at 08:37

## 2020-01-01 RX ADMIN — Medication 1 CAPSULE: at 09:07

## 2020-01-01 RX ADMIN — PANTOPRAZOLE SODIUM 40 MG: 40 TABLET, DELAYED RELEASE ORAL at 05:37

## 2020-01-01 RX ADMIN — PIPERACILLIN AND TAZOBACTAM 3.38 G: 3; .375 INJECTION, POWDER, FOR SOLUTION INTRAVENOUS at 14:43

## 2020-01-01 RX ADMIN — DOCUSATE SODIUM 100 MG: 100 CAPSULE, LIQUID FILLED ORAL at 21:36

## 2020-01-01 RX ADMIN — MORPHINE SULFATE 1 MG: 2 INJECTION, SOLUTION INTRAMUSCULAR; INTRAVENOUS at 06:07

## 2020-01-01 RX ADMIN — AMLODIPINE BESYLATE 5 MG: 5 TABLET ORAL at 09:57

## 2020-01-01 RX ADMIN — PANTOPRAZOLE SODIUM 40 MG: 40 TABLET, DELAYED RELEASE ORAL at 05:02

## 2020-01-01 RX ADMIN — PIPERACILLIN AND TAZOBACTAM 3.38 G: 3; .375 INJECTION, POWDER, LYOPHILIZED, FOR SOLUTION INTRAVENOUS at 02:08

## 2020-01-01 RX ADMIN — PREGABALIN 150 MG: 75 CAPSULE ORAL at 08:42

## 2020-01-01 RX ADMIN — FUROSEMIDE 80 MG: 40 TABLET ORAL at 10:11

## 2020-01-01 RX ADMIN — SODIUM CHLORIDE 1000 ML: 9 INJECTION, SOLUTION INTRAVENOUS at 08:12

## 2020-01-01 RX ADMIN — ACETAMINOPHEN 650 MG: 325 TABLET ORAL at 09:50

## 2020-01-01 RX ADMIN — LORAZEPAM 1 MG: 2 INJECTION INTRAMUSCULAR; INTRAVENOUS at 09:03

## 2020-01-01 RX ADMIN — CEFEPIME HYDROCHLORIDE 1 G: 1 INJECTION, POWDER, FOR SOLUTION INTRAMUSCULAR; INTRAVENOUS at 12:48

## 2020-01-01 RX ADMIN — TRAMADOL HYDROCHLORIDE 25 MG: 50 TABLET, FILM COATED ORAL at 18:00

## 2020-01-01 RX ADMIN — PANTOPRAZOLE SODIUM 40 MG: 40 TABLET, DELAYED RELEASE ORAL at 05:54

## 2020-01-01 RX ADMIN — PIPERACILLIN AND TAZOBACTAM 3.38 G: 3; .375 INJECTION, POWDER, LYOPHILIZED, FOR SOLUTION INTRAVENOUS at 02:16

## 2020-01-01 RX ADMIN — PANTOPRAZOLE SODIUM 40 MG: 40 TABLET, DELAYED RELEASE ORAL at 06:18

## 2020-01-01 RX ADMIN — TRAMADOL HYDROCHLORIDE 25 MG: 50 TABLET, FILM COATED ORAL at 21:18

## 2020-01-01 RX ADMIN — MORPHINE SULFATE 2 MG: 2 INJECTION, SOLUTION INTRAMUSCULAR; INTRAVENOUS at 14:57

## 2020-01-01 RX ADMIN — PANTOPRAZOLE SODIUM 40 MG: 40 TABLET, DELAYED RELEASE ORAL at 06:07

## 2020-01-01 RX ADMIN — PREGABALIN 150 MG: 75 CAPSULE ORAL at 09:07

## 2020-01-01 RX ADMIN — SODIUM CHLORIDE, PRESERVATIVE FREE 10 ML: 5 INJECTION INTRAVENOUS at 20:11

## 2020-01-01 RX ADMIN — PREGABALIN 150 MG: 75 CAPSULE ORAL at 10:15

## 2020-01-01 RX ADMIN — LIDOCAINE HYDROCHLORIDE ANHYDROUS 5 ML: 10 INJECTION, SOLUTION INFILTRATION at 16:17

## 2020-01-01 RX ADMIN — MORPHINE SULFATE 2 MG: 2 INJECTION, SOLUTION INTRAMUSCULAR; INTRAVENOUS at 04:59

## 2020-01-01 RX ADMIN — AMLODIPINE BESYLATE 10 MG: 10 TABLET ORAL at 17:56

## 2020-01-01 RX ADMIN — DEXAMETHASONE SODIUM PHOSPHATE 6 MG: 4 INJECTION, SOLUTION INTRAMUSCULAR; INTRAVENOUS at 12:24

## 2020-01-01 RX ADMIN — REMDESIVIR 200 MG: 100 INJECTION, POWDER, LYOPHILIZED, FOR SOLUTION INTRAVENOUS at 20:50

## 2020-01-01 RX ADMIN — ACETAMINOPHEN 650 MG: 325 TABLET ORAL at 22:18

## 2020-01-01 RX ADMIN — SODIUM CHLORIDE, PRESERVATIVE FREE 10 ML: 5 INJECTION INTRAVENOUS at 09:38

## 2020-01-01 RX ADMIN — PREGABALIN 150 MG: 75 CAPSULE ORAL at 07:52

## 2020-01-01 RX ADMIN — ACETAMINOPHEN 650 MG: 325 TABLET ORAL at 22:45

## 2020-01-01 RX ADMIN — AMLODIPINE BESYLATE 10 MG: 10 TABLET ORAL at 10:11

## 2020-01-01 RX ADMIN — PANTOPRAZOLE SODIUM 40 MG: 40 TABLET, DELAYED RELEASE ORAL at 10:11

## 2020-01-01 RX ADMIN — PREGABALIN 150 MG: 75 CAPSULE ORAL at 09:50

## 2020-01-01 RX ADMIN — CEFEPIME HYDROCHLORIDE 1 G: 1 INJECTION, POWDER, FOR SOLUTION INTRAMUSCULAR; INTRAVENOUS at 01:07

## 2020-01-01 RX ADMIN — MORPHINE SULFATE 2 MG: 2 INJECTION, SOLUTION INTRAMUSCULAR; INTRAVENOUS at 23:54

## 2020-01-01 RX ADMIN — MORPHINE SULFATE 4 MG: 4 INJECTION, SOLUTION INTRAMUSCULAR; INTRAVENOUS at 12:00

## 2020-01-01 RX ADMIN — MORPHINE SULFATE 4 MG: 4 INJECTION, SOLUTION INTRAMUSCULAR; INTRAVENOUS at 07:02

## 2020-01-01 RX ADMIN — PREGABALIN 150 MG: 75 CAPSULE ORAL at 21:36

## 2020-01-01 RX ADMIN — DOCUSATE SODIUM 100 MG: 100 CAPSULE, LIQUID FILLED ORAL at 21:18

## 2020-01-01 RX ADMIN — ENOXAPARIN SODIUM 40 MG: 40 INJECTION SUBCUTANEOUS at 07:53

## 2020-01-01 RX ADMIN — AMLODIPINE BESYLATE 5 MG: 5 TABLET ORAL at 09:50

## 2020-01-01 RX ADMIN — Medication 1 CAPSULE: at 08:25

## 2020-01-01 RX ADMIN — TRAMADOL HYDROCHLORIDE 25 MG: 50 TABLET, FILM COATED ORAL at 03:23

## 2020-01-01 RX ADMIN — SODIUM CHLORIDE, PRESERVATIVE FREE 10 ML: 5 INJECTION INTRAVENOUS at 10:16

## 2020-01-01 RX ADMIN — MORPHINE SULFATE 4 MG: 4 INJECTION, SOLUTION INTRAMUSCULAR; INTRAVENOUS at 11:59

## 2020-01-01 RX ADMIN — PIPERACILLIN AND TAZOBACTAM 3.38 G: 3; .375 INJECTION, POWDER, LYOPHILIZED, FOR SOLUTION INTRAVENOUS at 00:51

## 2020-01-01 RX ADMIN — PIPERACILLIN AND TAZOBACTAM 3.38 G: 3; .375 INJECTION, POWDER, LYOPHILIZED, FOR SOLUTION INTRAVENOUS at 02:31

## 2020-01-01 RX ADMIN — HEPARIN SODIUM 5000 UNITS: 5000 INJECTION, SOLUTION INTRAVENOUS; SUBCUTANEOUS at 05:36

## 2020-01-01 RX ADMIN — LORAZEPAM 1 MG: 2 INJECTION INTRAMUSCULAR; INTRAVENOUS at 05:52

## 2020-01-01 RX ADMIN — PIPERACILLIN AND TAZOBACTAM 4.5 G: 4; .5 INJECTION, POWDER, LYOPHILIZED, FOR SOLUTION INTRAVENOUS at 10:33

## 2020-01-01 RX ADMIN — SODIUM CHLORIDE, PRESERVATIVE FREE 10 ML: 5 INJECTION INTRAVENOUS at 05:37

## 2020-01-01 RX ADMIN — CEFEPIME HYDROCHLORIDE 1 G: 1 INJECTION, POWDER, FOR SOLUTION INTRAMUSCULAR; INTRAVENOUS at 14:56

## 2020-01-01 RX ADMIN — PREGABALIN 150 MG: 75 CAPSULE ORAL at 20:20

## 2020-01-01 RX ADMIN — PIPERACILLIN AND TAZOBACTAM 3.38 G: 3; .375 INJECTION, POWDER, LYOPHILIZED, FOR SOLUTION INTRAVENOUS at 17:30

## 2020-01-01 RX ADMIN — PREGABALIN 150 MG: 75 CAPSULE ORAL at 09:38

## 2020-01-01 RX ADMIN — Medication 1 CAPSULE: at 09:06

## 2020-01-01 RX ADMIN — Medication 1 CAPSULE: at 20:52

## 2020-01-01 RX ADMIN — Medication 1 CAPSULE: at 09:39

## 2020-01-01 RX ADMIN — PREGABALIN 150 MG: 75 CAPSULE ORAL at 21:30

## 2020-01-01 RX ADMIN — PROPOFOL 50 MG: 10 INJECTION, EMULSION INTRAVENOUS at 14:44

## 2020-01-01 RX ADMIN — ENOXAPARIN SODIUM 40 MG: 40 INJECTION SUBCUTANEOUS at 09:49

## 2020-01-01 RX ADMIN — PREGABALIN 150 MG: 75 CAPSULE ORAL at 22:05

## 2020-01-01 RX ADMIN — PIPERACILLIN AND TAZOBACTAM 3.38 G: 3; .375 INJECTION, POWDER, LYOPHILIZED, FOR SOLUTION INTRAVENOUS at 18:03

## 2020-01-01 RX ADMIN — MORPHINE SULFATE 4 MG: 4 INJECTION, SOLUTION INTRAMUSCULAR; INTRAVENOUS at 05:55

## 2020-01-01 RX ADMIN — MORPHINE SULFATE 1 MG: 2 INJECTION, SOLUTION INTRAMUSCULAR; INTRAVENOUS at 23:13

## 2020-01-01 RX ADMIN — PREGABALIN 150 MG: 75 CAPSULE ORAL at 08:46

## 2020-01-01 RX ADMIN — CEFAZOLIN SODIUM 1 G: 1 INJECTION, SOLUTION INTRAVENOUS at 03:14

## 2020-01-01 RX ADMIN — MORPHINE SULFATE 4 MG: 4 INJECTION, SOLUTION INTRAMUSCULAR; INTRAVENOUS at 12:25

## 2020-01-01 RX ADMIN — PREGABALIN 150 MG: 75 CAPSULE ORAL at 09:57

## 2020-01-01 RX ADMIN — VANCOMYCIN HYDROCHLORIDE 1500 MG: 5 INJECTION, POWDER, LYOPHILIZED, FOR SOLUTION INTRAVENOUS at 11:52

## 2020-01-01 RX ADMIN — AMLODIPINE BESYLATE 5 MG: 5 TABLET ORAL at 08:29

## 2020-01-01 RX ADMIN — SODIUM CHLORIDE, PRESERVATIVE FREE 10 ML: 5 INJECTION INTRAVENOUS at 08:38

## 2020-01-01 RX ADMIN — PREGABALIN 150 MG: 75 CAPSULE ORAL at 22:45

## 2020-01-01 RX ADMIN — SODIUM CHLORIDE, PRESERVATIVE FREE 10 ML: 5 INJECTION INTRAVENOUS at 09:16

## 2020-01-01 RX ADMIN — MORPHINE SULFATE 1 MG: 2 INJECTION, SOLUTION INTRAMUSCULAR; INTRAVENOUS at 13:58

## 2020-01-01 RX ADMIN — SODIUM CHLORIDE, PRESERVATIVE FREE 10 ML: 5 INJECTION INTRAVENOUS at 09:41

## 2020-01-01 RX ADMIN — ENOXAPARIN SODIUM 30 MG: 30 INJECTION SUBCUTANEOUS at 20:36

## 2020-01-01 RX ADMIN — LORAZEPAM 1 MG: 2 INJECTION INTRAMUSCULAR; INTRAVENOUS at 17:30

## 2020-01-01 RX ADMIN — AMLODIPINE BESYLATE 5 MG: 5 TABLET ORAL at 07:52

## 2020-01-01 RX ADMIN — MORPHINE SULFATE 2 MG: 2 INJECTION, SOLUTION INTRAMUSCULAR; INTRAVENOUS at 10:12

## 2020-01-01 RX ADMIN — PROPOFOL 25 MG: 10 INJECTION, EMULSION INTRAVENOUS at 14:53

## 2020-01-01 RX ADMIN — PIPERACILLIN AND TAZOBACTAM 4.5 G: 4; .5 INJECTION, POWDER, LYOPHILIZED, FOR SOLUTION INTRAVENOUS at 10:37

## 2020-01-01 RX ADMIN — PREGABALIN 150 MG: 75 CAPSULE ORAL at 08:36

## 2020-01-01 RX ADMIN — SODIUM CHLORIDE, PRESERVATIVE FREE 10 ML: 5 INJECTION INTRAVENOUS at 22:34

## 2020-01-01 RX ADMIN — LORAZEPAM 1 MG: 2 INJECTION INTRAMUSCULAR; INTRAVENOUS at 16:08

## 2020-01-01 RX ADMIN — PIPERACILLIN AND TAZOBACTAM 3.38 G: 3; .375 INJECTION, POWDER, LYOPHILIZED, FOR SOLUTION INTRAVENOUS at 01:46

## 2020-01-01 RX ADMIN — MORPHINE SULFATE 2 MG: 2 INJECTION, SOLUTION INTRAMUSCULAR; INTRAVENOUS at 10:15

## 2020-01-01 RX ADMIN — PANTOPRAZOLE SODIUM 40 MG: 40 TABLET, DELAYED RELEASE ORAL at 06:01

## 2020-01-01 RX ADMIN — PANTOPRAZOLE SODIUM 40 MG: 40 TABLET, DELAYED RELEASE ORAL at 05:53

## 2020-01-01 RX ADMIN — ACETAMINOPHEN 650 MG: 325 TABLET ORAL at 22:58

## 2020-01-01 RX ADMIN — MORPHINE SULFATE 2 MG: 2 INJECTION, SOLUTION INTRAMUSCULAR; INTRAVENOUS at 14:17

## 2020-01-01 RX ADMIN — PANTOPRAZOLE SODIUM 40 MG: 40 TABLET, DELAYED RELEASE ORAL at 08:46

## 2020-01-01 RX ADMIN — AMLODIPINE BESYLATE 10 MG: 10 TABLET ORAL at 14:55

## 2020-01-01 RX ADMIN — IOPAMIDOL 100 ML: 755 INJECTION, SOLUTION INTRAVENOUS at 04:13

## 2020-01-01 RX ADMIN — PREGABALIN 150 MG: 75 CAPSULE ORAL at 20:10

## 2020-01-01 RX ADMIN — PREGABALIN 150 MG: 75 CAPSULE ORAL at 09:42

## 2020-01-01 RX ADMIN — PREGABALIN 150 MG: 75 CAPSULE ORAL at 21:18

## 2020-01-01 RX ADMIN — SODIUM CHLORIDE, PRESERVATIVE FREE 10 ML: 5 INJECTION INTRAVENOUS at 08:47

## 2020-01-01 RX ADMIN — ATENOLOL 25 MG: 25 TABLET ORAL at 09:43

## 2020-01-01 RX ADMIN — MORPHINE SULFATE 2 MG: 2 INJECTION, SOLUTION INTRAMUSCULAR; INTRAVENOUS at 01:10

## 2020-01-01 RX ADMIN — ENOXAPARIN SODIUM 40 MG: 40 INJECTION SUBCUTANEOUS at 08:24

## 2020-01-01 RX ADMIN — PREGABALIN 150 MG: 75 CAPSULE ORAL at 08:28

## 2020-01-01 RX ADMIN — ENOXAPARIN SODIUM 40 MG: 40 INJECTION SUBCUTANEOUS at 08:29

## 2020-01-01 RX ADMIN — CEFEPIME HYDROCHLORIDE 2 G: 2 INJECTION, POWDER, FOR SOLUTION INTRAVENOUS at 21:01

## 2020-01-01 RX ADMIN — SODIUM CHLORIDE, PRESERVATIVE FREE 10 ML: 5 INJECTION INTRAVENOUS at 09:09

## 2020-01-01 RX ADMIN — SODIUM CHLORIDE, PRESERVATIVE FREE 10 ML: 5 INJECTION INTRAVENOUS at 20:30

## 2020-01-01 RX ADMIN — SODIUM CHLORIDE, PRESERVATIVE FREE 10 ML: 5 INJECTION INTRAVENOUS at 08:25

## 2020-01-01 RX ADMIN — MORPHINE SULFATE 4 MG: 4 INJECTION, SOLUTION INTRAMUSCULAR; INTRAVENOUS at 10:17

## 2020-01-01 RX ADMIN — SODIUM CHLORIDE, PRESERVATIVE FREE 10 ML: 5 INJECTION INTRAVENOUS at 12:38

## 2020-01-01 RX ADMIN — ENOXAPARIN SODIUM 30 MG: 30 INJECTION SUBCUTANEOUS at 20:30

## 2020-01-01 RX ADMIN — SODIUM CHLORIDE, POTASSIUM CHLORIDE, SODIUM LACTATE AND CALCIUM CHLORIDE: 600; 310; 30; 20 INJECTION, SOLUTION INTRAVENOUS at 14:11

## 2020-01-01 RX ADMIN — MORPHINE SULFATE 2 MG: 2 INJECTION, SOLUTION INTRAMUSCULAR; INTRAVENOUS at 17:25

## 2020-01-01 RX ADMIN — ATENOLOL 25 MG: 25 TABLET ORAL at 08:47

## 2020-01-01 RX ADMIN — MORPHINE SULFATE 4 MG: 4 INJECTION, SOLUTION INTRAMUSCULAR; INTRAVENOUS at 17:40

## 2020-01-01 RX ADMIN — SODIUM CHLORIDE, POTASSIUM CHLORIDE, SODIUM LACTATE AND CALCIUM CHLORIDE: 600; 310; 30; 20 INJECTION, SOLUTION INTRAVENOUS at 03:53

## 2020-01-01 RX ADMIN — SODIUM CHLORIDE, PRESERVATIVE FREE 10 ML: 5 INJECTION INTRAVENOUS at 01:36

## 2020-01-01 RX ADMIN — AMLODIPINE BESYLATE 5 MG: 5 TABLET ORAL at 09:47

## 2020-01-01 RX ADMIN — PIPERACILLIN AND TAZOBACTAM 3.38 G: 3; .375 INJECTION, POWDER, LYOPHILIZED, FOR SOLUTION INTRAVENOUS at 17:28

## 2020-01-01 RX ADMIN — VANCOMYCIN HYDROCHLORIDE 1000 MG: 1 INJECTION, SOLUTION INTRAVENOUS at 21:47

## 2020-01-01 RX ADMIN — SODIUM CHLORIDE, PRESERVATIVE FREE 10 ML: 5 INJECTION INTRAVENOUS at 09:58

## 2020-01-01 RX ADMIN — MORPHINE SULFATE 2 MG: 2 INJECTION, SOLUTION INTRAMUSCULAR; INTRAVENOUS at 09:03

## 2020-01-01 RX ADMIN — PIPERACILLIN AND TAZOBACTAM 4.5 G: 4; .5 INJECTION, POWDER, LYOPHILIZED, FOR SOLUTION INTRAVENOUS at 16:00

## 2020-01-01 RX ADMIN — SENNOSIDES AND DOCUSATE SODIUM 2 TABLET: 8.6; 5 TABLET ORAL at 09:58

## 2020-01-01 RX ADMIN — CEFEPIME HYDROCHLORIDE 1 G: 1 INJECTION, POWDER, FOR SOLUTION INTRAMUSCULAR; INTRAVENOUS at 12:36

## 2020-01-01 RX ADMIN — AMLODIPINE BESYLATE 5 MG: 5 TABLET ORAL at 08:37

## 2020-01-01 RX ADMIN — SODIUM CHLORIDE, PRESERVATIVE FREE 10 ML: 5 INJECTION INTRAVENOUS at 21:30

## 2020-01-01 RX ADMIN — MORPHINE SULFATE 2 MG: 2 INJECTION, SOLUTION INTRAMUSCULAR; INTRAVENOUS at 05:37

## 2020-01-01 RX ADMIN — SODIUM CHLORIDE, PRESERVATIVE FREE 10 ML: 5 INJECTION INTRAVENOUS at 10:24

## 2020-01-01 RX ADMIN — DEXTROSE MONOHYDRATE 900 MG: 50 INJECTION, SOLUTION INTRAVENOUS at 13:14

## 2020-01-01 RX ADMIN — PIPERACILLIN AND TAZOBACTAM 4.5 G: 4; .5 INJECTION, POWDER, LYOPHILIZED, FOR SOLUTION INTRAVENOUS at 04:18

## 2020-01-01 RX ADMIN — SENNOSIDES AND DOCUSATE SODIUM 1 TABLET: 8.6; 5 TABLET ORAL at 08:37

## 2020-01-01 RX ADMIN — ACETAMINOPHEN 650 MG: 325 TABLET ORAL at 11:59

## 2020-01-01 RX ADMIN — ACETAMINOPHEN 650 MG: 325 TABLET ORAL at 21:36

## 2020-01-01 RX ADMIN — ACETAMINOPHEN 650 MG: 325 TABLET ORAL at 09:07

## 2020-01-01 RX ADMIN — PIPERACILLIN AND TAZOBACTAM 4.5 G: 4; .5 INJECTION, POWDER, LYOPHILIZED, FOR SOLUTION INTRAVENOUS at 09:16

## 2020-01-01 RX ADMIN — WARFARIN SODIUM 5 MG: 5 TABLET ORAL at 18:03

## 2020-01-01 RX ADMIN — PIPERACILLIN AND TAZOBACTAM 3.38 G: 3; .375 INJECTION, POWDER, LYOPHILIZED, FOR SOLUTION INTRAVENOUS at 10:20

## 2020-01-01 RX ADMIN — PREGABALIN 150 MG: 75 CAPSULE ORAL at 01:37

## 2020-01-01 RX ADMIN — ACETAMINOPHEN 650 MG: 325 TABLET ORAL at 14:51

## 2020-01-01 RX ADMIN — LOPERAMIDE HYDROCHLORIDE 2 MG: 2 CAPSULE ORAL at 20:52

## 2020-01-01 RX ADMIN — SODIUM CHLORIDE, PRESERVATIVE FREE 10 ML: 5 INJECTION INTRAVENOUS at 09:43

## 2020-01-01 RX ADMIN — TRAMADOL HYDROCHLORIDE 25 MG: 50 TABLET, FILM COATED ORAL at 11:10

## 2020-01-01 RX ADMIN — CEFAZOLIN SODIUM 1 G: 1 INJECTION, SOLUTION INTRAVENOUS at 11:10

## 2020-01-01 RX ADMIN — ACETAMINOPHEN 650 MG: 325 TABLET ORAL at 21:30

## 2020-01-01 RX ADMIN — SODIUM CHLORIDE, PRESERVATIVE FREE 10 ML: 5 INJECTION INTRAVENOUS at 22:05

## 2020-01-01 RX ADMIN — PREGABALIN 150 MG: 75 CAPSULE ORAL at 09:47

## 2020-01-01 RX ADMIN — LORAZEPAM 0.5 MG: 2 INJECTION INTRAMUSCULAR; INTRAVENOUS at 10:10

## 2020-01-01 RX ADMIN — AMLODIPINE BESYLATE 5 MG: 5 TABLET ORAL at 10:15

## 2020-01-01 RX ADMIN — ENOXAPARIN SODIUM 40 MG: 40 INJECTION SUBCUTANEOUS at 08:25

## 2020-01-01 RX ADMIN — PROPOFOL 25 MG: 10 INJECTION, EMULSION INTRAVENOUS at 14:49

## 2020-01-01 RX ADMIN — VANCOMYCIN HYDROCHLORIDE 1500 MG: 5 INJECTION, POWDER, LYOPHILIZED, FOR SOLUTION INTRAVENOUS at 12:42

## 2020-01-01 RX ADMIN — ENOXAPARIN SODIUM 40 MG: 40 INJECTION SUBCUTANEOUS at 09:07

## 2020-01-01 RX ADMIN — SODIUM CHLORIDE, PRESERVATIVE FREE 10 ML: 5 INJECTION INTRAVENOUS at 23:28

## 2020-01-01 RX ADMIN — TRAMADOL HYDROCHLORIDE 50 MG: 50 TABLET, FILM COATED ORAL at 13:12

## 2020-01-01 RX ADMIN — PIPERACILLIN AND TAZOBACTAM 4.5 G: 4; .5 INJECTION, POWDER, LYOPHILIZED, FOR SOLUTION INTRAVENOUS at 21:50

## 2020-01-01 RX ADMIN — WARFARIN SODIUM 5 MG: 2.5 TABLET ORAL at 17:56

## 2020-01-01 RX ADMIN — PREGABALIN 150 MG: 75 CAPSULE ORAL at 10:10

## 2020-01-01 RX ADMIN — ENOXAPARIN SODIUM 30 MG: 30 INJECTION SUBCUTANEOUS at 09:09

## 2020-01-01 RX ADMIN — PANTOPRAZOLE SODIUM 40 MG: 40 TABLET, DELAYED RELEASE ORAL at 05:40

## 2020-01-01 RX ADMIN — LIDOCAINE HYDROCHLORIDE 100 MG: 20 INJECTION, SOLUTION INTRAVENOUS at 14:38

## 2020-01-01 RX ADMIN — SODIUM CHLORIDE, PRESERVATIVE FREE 10 ML: 5 INJECTION INTRAVENOUS at 07:52

## 2020-01-01 RX ADMIN — CEFEPIME HYDROCHLORIDE 1 G: 1 INJECTION, POWDER, FOR SOLUTION INTRAMUSCULAR; INTRAVENOUS at 13:16

## 2020-01-01 RX ADMIN — AMLODIPINE BESYLATE 5 MG: 5 TABLET ORAL at 08:41

## 2020-01-01 RX ADMIN — MORPHINE SULFATE 2 MG: 2 INJECTION, SOLUTION INTRAMUSCULAR; INTRAVENOUS at 20:46

## 2020-01-01 RX ADMIN — SODIUM CHLORIDE, PRESERVATIVE FREE 10 ML: 5 INJECTION INTRAVENOUS at 20:20

## 2020-01-01 RX ADMIN — LORAZEPAM 1 MG: 2 INJECTION INTRAMUSCULAR; INTRAVENOUS at 22:27

## 2020-01-01 RX ADMIN — PREGABALIN 150 MG: 75 CAPSULE ORAL at 20:34

## 2020-01-01 RX ADMIN — MORPHINE SULFATE 1 MG: 2 INJECTION, SOLUTION INTRAMUSCULAR; INTRAVENOUS at 19:00

## 2020-01-01 RX ADMIN — DEXAMETHASONE SODIUM PHOSPHATE 8 MG: 10 INJECTION INTRAMUSCULAR; INTRAVENOUS at 05:27

## 2020-01-01 RX ADMIN — ACETAMINOPHEN 650 MG: 325 TABLET ORAL at 20:46

## 2020-01-01 RX ADMIN — PREGABALIN 150 MG: 75 CAPSULE ORAL at 08:24

## 2020-01-01 RX ADMIN — PREGABALIN 150 MG: 75 CAPSULE ORAL at 08:25

## 2020-01-01 RX ADMIN — PREGABALIN 150 MG: 75 CAPSULE ORAL at 09:39

## 2020-01-01 RX ADMIN — PIPERACILLIN AND TAZOBACTAM 4.5 G: 4; .5 INJECTION, POWDER, LYOPHILIZED, FOR SOLUTION INTRAVENOUS at 03:38

## 2020-01-01 RX ADMIN — SODIUM CHLORIDE, PRESERVATIVE FREE 10 ML: 5 INJECTION INTRAVENOUS at 21:18

## 2020-01-01 RX ADMIN — MORPHINE SULFATE 4 MG: 4 INJECTION, SOLUTION INTRAMUSCULAR; INTRAVENOUS at 17:39

## 2020-01-01 RX ADMIN — Medication 1 CAPSULE: at 08:29

## 2020-01-01 RX ADMIN — SODIUM CHLORIDE, PRESERVATIVE FREE 10 ML: 5 INJECTION INTRAVENOUS at 21:50

## 2020-01-01 RX ADMIN — PIPERACILLIN AND TAZOBACTAM 3.38 G: 3; .375 INJECTION, POWDER, LYOPHILIZED, FOR SOLUTION INTRAVENOUS at 17:12

## 2020-01-01 RX ADMIN — VANCOMYCIN HYDROCHLORIDE 500 MG: 500 INJECTION, POWDER, LYOPHILIZED, FOR SOLUTION INTRAVENOUS at 01:36

## 2020-01-01 RX ADMIN — PREGABALIN 150 MG: 75 CAPSULE ORAL at 20:47

## 2020-01-01 RX ADMIN — CEFAZOLIN SODIUM 1 G: 1 INJECTION, SOLUTION INTRAVENOUS at 17:41

## 2020-01-01 RX ADMIN — CEFEPIME HYDROCHLORIDE 2 G: 2 INJECTION, POWDER, FOR SOLUTION INTRAVENOUS at 11:25

## 2020-01-01 RX ADMIN — PREGABALIN 150 MG: 75 CAPSULE ORAL at 09:06

## 2020-01-01 RX ADMIN — TRAMADOL HYDROCHLORIDE 25 MG: 50 TABLET, FILM COATED ORAL at 05:24

## 2020-01-01 RX ADMIN — MORPHINE SULFATE 1 MG: 2 INJECTION, SOLUTION INTRAMUSCULAR; INTRAVENOUS at 10:12

## 2020-01-01 RX ADMIN — CEPHALEXIN 250 MG: 250 CAPSULE ORAL at 18:03

## 2020-01-01 ASSESSMENT — PAIN SCALES - GENERAL
PAINLEVEL_OUTOF10: 6
PAINLEVEL_OUTOF10: 7
PAINLEVEL_OUTOF10: 9
PAINLEVEL_OUTOF10: 0
PAINLEVEL_OUTOF10: 0
PAINLEVEL_OUTOF10: 10
PAINLEVEL_OUTOF10: 0
PAINLEVEL_OUTOF10: 10
PAINLEVEL_OUTOF10: 10
PAINLEVEL_OUTOF10: 7
PAINLEVEL_OUTOF10: 0
PAINLEVEL_OUTOF10: 3
PAINLEVEL_OUTOF10: 0
PAINLEVEL_OUTOF10: 0
PAINLEVEL_OUTOF10: 6
PAINLEVEL_OUTOF10: 10
PAINLEVEL_OUTOF10: 8
PAINLEVEL_OUTOF10: 10
PAINLEVEL_OUTOF10: 7
PAINLEVEL_OUTOF10: 0
PAINLEVEL_OUTOF10: 0
PAINLEVEL_OUTOF10: 6
PAINLEVEL_OUTOF10: 0
PAINLEVEL_OUTOF10: 10
PAINLEVEL_OUTOF10: 3
PAINLEVEL_OUTOF10: 7
PAINLEVEL_OUTOF10: 0
PAINLEVEL_OUTOF10: 0
PAINLEVEL_OUTOF10: 6
PAINLEVEL_OUTOF10: 0
PAINLEVEL_OUTOF10: 8
PAINLEVEL_OUTOF10: 0
PAINLEVEL_OUTOF10: 10
PAINLEVEL_OUTOF10: 8
PAINLEVEL_OUTOF10: 8
PAINLEVEL_OUTOF10: 10
PAINLEVEL_OUTOF10: 0
PAINLEVEL_OUTOF10: 0
PAINLEVEL_OUTOF10: 7
PAINLEVEL_OUTOF10: 0
PAINLEVEL_OUTOF10: 0
PAINLEVEL_OUTOF10: 7
PAINLEVEL_OUTOF10: 5
PAINLEVEL_OUTOF10: 3
PAINLEVEL_OUTOF10: 8
PAINLEVEL_OUTOF10: 0
PAINLEVEL_OUTOF10: 10
PAINLEVEL_OUTOF10: 3
PAINLEVEL_OUTOF10: 0
PAINLEVEL_OUTOF10: 6
PAINLEVEL_OUTOF10: 9
PAINLEVEL_OUTOF10: 0
PAINLEVEL_OUTOF10: 0
PAINLEVEL_OUTOF10: 9
PAINLEVEL_OUTOF10: 5
PAINLEVEL_OUTOF10: 10
PAINLEVEL_OUTOF10: 0
PAINLEVEL_OUTOF10: 0
PAINLEVEL_OUTOF10: 5
PAINLEVEL_OUTOF10: 3
PAINLEVEL_OUTOF10: 5
PAINLEVEL_OUTOF10: 0
PAINLEVEL_OUTOF10: 6
PAINLEVEL_OUTOF10: 6
PAINLEVEL_OUTOF10: 9
PAINLEVEL_OUTOF10: 2
PAINLEVEL_OUTOF10: 0
PAINLEVEL_OUTOF10: 0
PAINLEVEL_OUTOF10: 5
PAINLEVEL_OUTOF10: 8
PAINLEVEL_OUTOF10: 10
PAINLEVEL_OUTOF10: 9
PAINLEVEL_OUTOF10: 8
PAINLEVEL_OUTOF10: 8
PAINLEVEL_OUTOF10: 10
PAINLEVEL_OUTOF10: 3
PAINLEVEL_OUTOF10: 9
PAINLEVEL_OUTOF10: 5
PAINLEVEL_OUTOF10: 8
PAINLEVEL_OUTOF10: 0
PAINLEVEL_OUTOF10: 3
PAINLEVEL_OUTOF10: 7
PAINLEVEL_OUTOF10: 8
PAINLEVEL_OUTOF10: 5
PAINLEVEL_OUTOF10: 0
PAINLEVEL_OUTOF10: 6
PAINLEVEL_OUTOF10: 0
PAINLEVEL_OUTOF10: 8
PAINLEVEL_OUTOF10: 0
PAINLEVEL_OUTOF10: 6

## 2020-01-01 ASSESSMENT — PULMONARY FUNCTION TESTS
PIF_VALUE: 0
PIF_VALUE: 1
PIF_VALUE: 0
PIF_VALUE: 1
PIF_VALUE: 1
PIF_VALUE: 0

## 2020-01-01 ASSESSMENT — PAIN DESCRIPTION - ORIENTATION
ORIENTATION: RIGHT
ORIENTATION: RIGHT
ORIENTATION: MID
ORIENTATION: LEFT
ORIENTATION: RIGHT
ORIENTATION: LEFT
ORIENTATION: RIGHT;LEFT
ORIENTATION: LEFT
ORIENTATION: RIGHT

## 2020-01-01 ASSESSMENT — PAIN SCALES - PAIN ASSESSMENT IN ADVANCED DEMENTIA (PAINAD)
FACIALEXPRESSION: 2
BREATHING: 2
FACIALEXPRESSION: 2
TOTALSCORE: 5
FACIALEXPRESSION: 2
FACIALEXPRESSION: 2
CONSOLABILITY: 1
TOTALSCORE: 8
BREATHING: 2
NEGVOCALIZATION: 1
NEGVOCALIZATION: 1
BODYLANGUAGE: 1
NEGVOCALIZATION: 2
NEGVOCALIZATION: 2
BODYLANGUAGE: 1
BODYLANGUAGE: 1
TOTALSCORE: 9
FACIALEXPRESSION: 2
NEGVOCALIZATION: 2
TOTALSCORE: 8
TOTALSCORE: 8
BODYLANGUAGE: 1
TOTALSCORE: 8
BREATHING: 2
BREATHING: 2
CONSOLABILITY: 1
TOTALSCORE: 8
BODYLANGUAGE: 1
FACIALEXPRESSION: 1
BREATHING: 2
FACIALEXPRESSION: 0
CONSOLABILITY: 1
BODYLANGUAGE: 1
BREATHING: 2
FACIALEXPRESSION: 2
CONSOLABILITY: 1
CONSOLABILITY: 1
FACIALEXPRESSION: 2
CONSOLABILITY: 1
BODYLANGUAGE: 1
BREATHING: 2
NEGVOCALIZATION: 1
NEGVOCALIZATION: 2
CONSOLABILITY: 2
BREATHING: 2
FACIALEXPRESSION: 2
BREATHING: 1
BODYLANGUAGE: 0
FACIALEXPRESSION: 2
BREATHING: 2
BREATHING: 2
TOTALSCORE: 8
TOTALSCORE: 8
CONSOLABILITY: 0
BREATHING: 2
BREATHING: 2
BODYLANGUAGE: 1
FACIALEXPRESSION: 2
FACIALEXPRESSION: 2
BODYLANGUAGE: 1
TOTALSCORE: 8
FACIALEXPRESSION: 2
FACIALEXPRESSION: 2
FACIALEXPRESSION: 1
BODYLANGUAGE: 1
BREATHING: 1
NEGVOCALIZATION: 2
TOTALSCORE: 8
BODYLANGUAGE: 1
BREATHING: 2
NEGVOCALIZATION: 2
CONSOLABILITY: 1
BREATHING: 2
BODYLANGUAGE: 1
BODYLANGUAGE: 1
NEGVOCALIZATION: 2
TOTALSCORE: 3
BODYLANGUAGE: 1
CONSOLABILITY: 2
NEGVOCALIZATION: 2
CONSOLABILITY: 1
TOTALSCORE: 8
BODYLANGUAGE: 1
BREATHING: 2
BODYLANGUAGE: 1
FACIALEXPRESSION: 2
BODYLANGUAGE: 1
FACIALEXPRESSION: 2
NEGVOCALIZATION: 2
NEGVOCALIZATION: 2
CONSOLABILITY: 1
NEGVOCALIZATION: 2
BODYLANGUAGE: 1
NEGVOCALIZATION: 2
FACIALEXPRESSION: 2
TOTALSCORE: 8
BREATHING: 1
CONSOLABILITY: 2
NEGVOCALIZATION: 2
NEGVOCALIZATION: 2
CONSOLABILITY: 1
CONSOLABILITY: 1
TOTALSCORE: 9
CONSOLABILITY: 1
BREATHING: 2
CONSOLABILITY: 2
CONSOLABILITY: 1
TOTALSCORE: 5
NEGVOCALIZATION: 2
NEGVOCALIZATION: 1
BODYLANGUAGE: 1
NEGVOCALIZATION: 2
TOTALSCORE: 8
FACIALEXPRESSION: 2
BREATHING: 2
BODYLANGUAGE: 1
NEGVOCALIZATION: 2
TOTALSCORE: 8
CONSOLABILITY: 1
BREATHING: 2
FACIALEXPRESSION: 2
TOTALSCORE: 8

## 2020-01-01 ASSESSMENT — PAIN DESCRIPTION - ONSET
ONSET: ON-GOING
ONSET: SUDDEN
ONSET: ON-GOING
ONSET: GRADUAL
ONSET: ON-GOING

## 2020-01-01 ASSESSMENT — ENCOUNTER SYMPTOMS
TROUBLE SWALLOWING: 0
WHEEZING: 0
COLOR CHANGE: 0
RESPIRATORY NEGATIVE: 1
VOICE CHANGE: 0
VOMITING: 0
ABDOMINAL PAIN: 0
PHOTOPHOBIA: 0
TROUBLE SWALLOWING: 0
BLOOD IN STOOL: 0
PHOTOPHOBIA: 0
ANAL BLEEDING: 0
TROUBLE SWALLOWING: 0
CONSTIPATION: 0
ANAL BLEEDING: 0
EYES NEGATIVE: 1
COLOR CHANGE: 0
COUGH: 0
DIARRHEA: 0
SORE THROAT: 0
WHEEZING: 0
BLOOD IN STOOL: 0
COUGH: 0
VOICE CHANGE: 0
DIARRHEA: 0
WHEEZING: 0
DIARRHEA: 0
NAUSEA: 0
SHORTNESS OF BREATH: 0
SORE THROAT: 0
VOMITING: 0
COLOR CHANGE: 0
NAUSEA: 0
BLOOD IN STOOL: 0
GASTROINTESTINAL NEGATIVE: 1
VOICE CHANGE: 0
ABDOMINAL PAIN: 0
ANAL BLEEDING: 0
SHORTNESS OF BREATH: 0
ABDOMINAL PAIN: 0
NAUSEA: 0
CONSTIPATION: 0
SORE THROAT: 0
PHOTOPHOBIA: 0
SHORTNESS OF BREATH: 0
COUGH: 0
VOMITING: 0
CONSTIPATION: 0
ALLERGIC/IMMUNOLOGIC NEGATIVE: 1

## 2020-01-01 ASSESSMENT — PAIN DESCRIPTION - PROGRESSION
CLINICAL_PROGRESSION: GRADUALLY WORSENING
CLINICAL_PROGRESSION: NOT CHANGED
CLINICAL_PROGRESSION: NOT CHANGED
CLINICAL_PROGRESSION: RAPIDLY WORSENING
CLINICAL_PROGRESSION: NOT CHANGED
CLINICAL_PROGRESSION: NOT CHANGED
CLINICAL_PROGRESSION: RAPIDLY WORSENING
CLINICAL_PROGRESSION: NOT CHANGED

## 2020-01-01 ASSESSMENT — PAIN DESCRIPTION - FREQUENCY
FREQUENCY: CONTINUOUS
FREQUENCY: CONTINUOUS
FREQUENCY: INTERMITTENT
FREQUENCY: CONTINUOUS
FREQUENCY: CONTINUOUS
FREQUENCY: INTERMITTENT
FREQUENCY: CONTINUOUS

## 2020-01-01 ASSESSMENT — PAIN DESCRIPTION - LOCATION
LOCATION: FOOT;PELVIS
LOCATION: FOOT
LOCATION: SHOULDER
LOCATION: FOOT
LOCATION: SHOULDER
LOCATION: FOOT
LOCATION: SHOULDER
LOCATION: FOOT
LOCATION: ABDOMEN
LOCATION: FOOT
LOCATION: SHOULDER
LOCATION: SHOULDER;FOOT

## 2020-01-01 ASSESSMENT — PAIN - FUNCTIONAL ASSESSMENT

## 2020-01-01 ASSESSMENT — PAIN DESCRIPTION - PAIN TYPE
TYPE: ACUTE PAIN
TYPE: CHRONIC PAIN
TYPE: ACUTE PAIN

## 2020-01-01 ASSESSMENT — PAIN DESCRIPTION - DESCRIPTORS
DESCRIPTORS: ACHING
DESCRIPTORS: ACHING
DESCRIPTORS: SHARP
DESCRIPTORS: ACHING;DISCOMFORT
DESCRIPTORS: ACHING
DESCRIPTORS: ACHING;STABBING
DESCRIPTORS: ACHING;DISCOMFORT
DESCRIPTORS: ACHING
DESCRIPTORS: ACHING

## 2020-01-04 PROBLEM — R42 POSTURAL DIZZINESS WITH NEAR SYNCOPE: Status: ACTIVE | Noted: 2020-01-01
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
## 2020-01-04 PROBLEM — L03.116 LEFT LEG CELLULITIS: Status: ACTIVE | Noted: 2020-01-01
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
## 2020-01-04 PROBLEM — R55 POSTURAL DIZZINESS WITH NEAR SYNCOPE: Status: ACTIVE | Noted: 2020-01-01

## 2020-01-04 PROBLEM — M21.6X2: Status: ACTIVE | Noted: 2020-01-01

## 2020-01-04 NOTE — ED NOTES
Patient assisted to use of bedpan.  Urine specimen collected and sent to lab     Cheryl Krueger RN  01/04/20 1111

## 2020-01-04 NOTE — H&P
discussed with the patient at the time of admission in lay language who agree to the above plan and disposition of admission for further care. All concerns and questions addressed. Patient assessment and plan in conjunction with supervising physician - Dr. Gardenia Cotto    DVT Prophylaxis [] Lovenox, []  Heparin, [] SCDs, [] Ambulation  [] Long term [facility] [place]  Patient is on Coumadin. GI Prophylaxis [x] PPI,  [] H2 Blocker,  [] Carafate,  [] Diet,  [] No GI prophylaxis, N/A: patient is not under significant medical stress, non-ICU or is receiving a diet/tube feeds   Code Status  Full CODE   Disposition Patient requires continued admission due to Postural dizziness with near syncope. Discharge Plan: Patient plans to return home upon discharge after seen by case management team.   MDM [] Low, [x] Moderate,[]  High  Patient's risk as above due to:      [x] One or more chronic illnesses with mild exacerbation progression      [] Two or more stable chronic illnesses      [] Undiagnosed new problem with uncertain prognosis      [] Elective major surgery      []Prescription drug management     History of Present Illness:     Principal Problem: Postural dizziness with near syncope  Cristela Gramajo is a 80 y.o. female who presents to the ED from home with complaints of fall, shoulder pain, right knee pain, left leg redness, and left foot deformity. Patient has past medical history of GERD, right knee blood clots-on Coumadin, Osteoporosis, arthritis, diverticulosis, and hypertension. Patient lives alone. She stated that she had multiple falls in the past and last fall was today due to her right knee pain and feeling dizzy when she get up in the morning. Reports history of vertigo. She stated that she was using her walker at home. This morning, she was dizzy when she woke up and when she was trying to get clothes from her closet, fell and thinking she injured her right shoulder.  She denies lightheadedness, headache, loss of consciousness, hitting her head or head injury. She denies vision changes, focal weakness or numbness. Patient has left foot deformity from falls long time ago that most likely causes her imbalance issues. She described her pain as aching and very painful to touch, rate 5/10, no radiation, and felt better with Ultram. Her right knee with scratch not bruised or redness and not swollen. Patient denies CP, palpitation, fever, cough, SOB or difficulty breathing. Denies any other symptoms including abdominal pain, N/V, changes in bowels, dysuria, hematuria, and B/L weakness. No family member at bedside.     Upon interview, the patient provided the history as above. ED Course: Discussed case with ED physician prior to admission. ROS: Ten point ROS reviewed and negative, unless as noted above per HPI. Objective:   No intake or output data in the 24 hours ending 01/04/20 1354     Vitals:   Vitals:    01/04/20 0813 01/04/20 0816 01/04/20 0943 01/04/20 1215   BP:  (!) 213/78 (!) 202/68 (!) 186/63   Pulse: 68  68 72   Resp: 18  19 16   Temp: 98.6 °F (37 °C)      TempSrc: Oral      SpO2: 97%  100% 97%   Weight: 209 lb (94.8 kg)      Height: 5' 5\" (1.651 m)        Physical Exam: 01/04/20    GEN  -Awake, alert, appearing morbidly obese female, sitting upright in bed, cooperative but unable to give adequate history. Appears given age. EYES -Pupils are equally round. No vision changes. No scleral erythema, discharge, or conjunctivitis. HENT -MM are moist. Oral pharynx without exudates, no evidence of thrush. NECK -Supple, no apparent thyromegaly or masses. RESP -LS CTA equal bilat, no wheezes, rales or rhonchi. Symmetric chest movement. No respiratory distress noted. C/V  -S1/S2 auscultated. RRR without appreciable M/R/G. No JVD or carotid bruits. Peripheral pulses equal bilaterally and palpable. Peripheral pulses equal bilaterally and palpable. No peripheral edema.    GI  -Abdomen is soft, round, non-distended, no significant tenderness. No masses or guarding. + BS in all quadrants. Rectal exam deferred.   -Aguilera catheter is not present. LYMPH  -No palpable cervical lymphadenopathy and no hepatosplenomegaly. No petechiae or ecchymoses. MS  -B/L extremities strong muscles strength. Full movements. No gross joint deformities. No swelling, intact sensation symmetrical.   SKIN  -Normal coloration, warm, dry. No open wounds or ulcers. NEURO  -CN 2-12 appear grossly intact, normal speech, no lateralizing weakness. PSYC  -Awake, alert, oriented x 4. Appropriate affect. Past Medical History:      Past Medical History:   Diagnosis Date    Acid reflux     Anesthesia     \"Combative After Anesthesia\"    Arthritis     \"All Over My Body\"    Diverticulosis     HTN (hypertension)     Hx of blood clots 2000's    \"Behind Right Knee\"    Kidney stones     Passed Kidney Stones In 1970's    Lower back pain     \"Sometimes\"    Neuropathy     bilateral feet    Osteoporosis     IV Reclast Once A Year, Last Dose Received In 2014    Purpura ([address]     \"Arms\"    Shingles     \"Twice, First Time  In 1972 Left Breast Area, Second Time In 2000 Right Lower Abdomen\"    Skin Cancer Excised Back Of Right Leg 2000's    Teeth missing     Upper And Lower    Urinary incontinence     Wears dentures     Full Upper    Wears glasses      Past Surgery History:  Patient  has a past surgical history that includes bladder suspension (1990's); Toe amputation (Right, 1990's Or 2000's); Toe amputation (Right, 5-23-13); Colonoscopy (Last Done In 2000's); Skin cancer excision (2000's); Dental surgery; Appendectomy (1941); joint replacement (Right, 12-2-14); Tonsillectomy (1962); Hysterectomy, total abdominal (1970's); Dilation and curettage of uterus; Ovarian cyst surgery (Right, 1957); fracture surgery (Left, 7-7-12); Breast cyst excision (Right, 1955); other surgical history (11/23/15);  Total hip arthroplasty (Right, 08/21/2016); Jeanne Isbell Tape]      \"Redness And Tears The Skin , Paper Tape Is Ok To Use\"     Medications:   Medications:    Infusions:   PRN Meds:   Prior to Admission Meds:  Prior to Admission medications    Medication Sig Start Date End Date Taking? Authorizing Provider   pregabalin (LYRICA) 150 MG capsule Take 1 capsule by mouth 2 times daily. . 9/6/18 6/1/21 Yes Shi Ramey MD   amLODIPine (NORVASC) 10 MG tablet Take 1 tablet by mouth daily 9/7/18  Yes Shi Ramey MD   warfarin (COUMADIN) 5 MG tablet Take 5 mg by mouth daily   Yes Historical Provider, MD   acetaminophen (TYLENOL) 500 MG tablet Take 2 tablets by mouth every 6 hours as needed for Pain or Fever 8/24/16  Yes Abad Hummel MD   pantoprazole (PROTONIX) 40 MG tablet Take 1 tablet by mouth every morning 8/24/16  Yes Abad Hummel MD   Zoledronic Acid (RECLAST IV) Infuse intravenously   Yes Historical Provider, MD   atenolol (TENORMIN) 25 MG tablet Take 25 mg by mouth every morning    Yes Historical Provider, MD   furosemide (LASIX) 40 MG tablet Take 80 mg by mouth every morning    Yes Historical Provider, MD   docusate sodium (COLACE, DULCOLAX) 100 MG CAPS Take 100 mg by mouth 2 times daily 9/6/18   Shi Ramey MD     Data:     Laboratory this visit:  Reviewed  Recent Labs     01/04/20  0951   WBC 10.4   HGB 12.4*   HCT 41.8         Recent Labs     01/04/20  0951      K 4.4      CO2 26   BUN 21   CREATININE 0.8     Recent Labs     01/04/20  0951   AST 21   ALT 12   BILITOT 0.5   ALKPHOS 82     Recent Labs     01/04/20  0951   INR 1.89     No results for input(s): CKTOTAL, CKMB, CKMBINDEX in the last 72 hours. Invalid input(s): Clarissa Soares input(s): PRO-BNP    Radiology this visit:  Reviewed.     Xr Chest Standard (2 Vw)    Result Date: 1/4/2020  EXAMINATION: TWO XRAY VIEWS OF THE CHEST 1/4/2020 11:08 am COMPARISON: 09/02/2018 HISTORY: ORDERING SYSTEM PROVIDED HISTORY: Chest pain TECHNOLOGIST PROVIDED HISTORY: Reason for exam:->Chest pain Reason for Exam: chest pain;fall Acuity: Acute Type of Exam: Initial Mechanism of Injury: fell in closet today,back pain (cxr fall ER protocol) FINDINGS: The lungs are without acute focal process. There is no effusion or pneumothorax. The cardiomediastinal silhouette is stable. The osseous structures are stable. No acute process. Xr Pelvis (1-2 Views)    Result Date: 1/4/2020  EXAMINATION: ONE XRAY VIEW OF THE PELVIS 1/4/2020 11:08 am COMPARISON: 09/02/2018 HISTORY: ORDERING SYSTEM PROVIDED HISTORY: trauma TECHNOLOGIST PROVIDED HISTORY: Reason for exam:->trauma Reason for Exam: fall,pain Acuity: Acute Type of Exam: Initial Mechanism of Injury: fel today in closet,(er fall protocol);c/o lower back pain FINDINGS: There is no evidence of acute fracture. There is normal alignment. No acute joint abnormality. No focal osseous lesion. No focal soft tissue abnormality. Previous right hip arthroplasty in previous internal fixation of the left femur. No acute osseous abnormality. Xr Shoulder Right (min 2 Views)    Result Date: 1/4/2020  EXAMINATION: THREE XRAY VIEWS OF THE RIGHT SHOULDER; TWO XRAY VIEWS OF THE RIGHT HUMERUS 1/4/2020 8:35 am COMPARISON: None. HISTORY: ORDERING SYSTEM PROVIDED HISTORY: trauma TECHNOLOGIST PROVIDED HISTORY: Right Shoulder - XR Portable Reason for exam:->trauma Reason for Exam: trauma,pain Acuity: Acute Type of Exam: Initial Mechanism of Injury: fell on file cabinet this morning,landed on right arm,skin tear on PA elbow Relevant Medical/Surgical History: pt on blood thinners; ORDERING SYSTEM PROVIDED HISTORY: fall, arm pain TECHNOLOGIST PROVIDED HISTORY: Reason for exam:->fall, arm pain Reason for Exam: fall this morning, arm pain Acuity: Acute Type of Exam: Initial Mechanism of Injury: skin tear on PA elbow,fell and laned on arm up against file cabinet Relevant Medical/Surgical History: pt on blood thinners FINDINGS: Right shoulder:  The bones are demineralized. There are mature ossifications in the region of the rotator cuff tendons. There is AC degenerative change with a dominant projecting distal clavicular osteophyte. There are small marginal glenohumeral osteophytes. The bones are demineralized. The visualized right lung is clear. Right humerus: The bones are demineralized. A fracture is not identified. Bony demineralization. A fracture is not identified. Xr Humerus Right (min 2 Views)    Result Date: 1/4/2020  EXAMINATION: THREE XRAY VIEWS OF THE RIGHT SHOULDER; TWO XRAY VIEWS OF THE RIGHT HUMERUS 1/4/2020 8:35 am COMPARISON: None. HISTORY: ORDERING SYSTEM PROVIDED HISTORY: trauma TECHNOLOGIST PROVIDED HISTORY: Right Shoulder - XR Portable Reason for exam:->trauma Reason for Exam: trauma,pain Acuity: Acute Type of Exam: Initial Mechanism of Injury: fell on file cabinet this morning,landed on right arm,skin tear on PA elbow Relevant Medical/Surgical History: pt on blood thinners; ORDERING SYSTEM PROVIDED HISTORY: fall, arm pain TECHNOLOGIST PROVIDED HISTORY: Reason for exam:->fall, arm pain Reason for Exam: fall this morning, arm pain Acuity: Acute Type of Exam: Initial Mechanism of Injury: skin tear on PA elbow,fell and laned on arm up against file cabinet Relevant Medical/Surgical History: pt on blood thinners FINDINGS: Right shoulder: The bones are demineralized. There are mature ossifications in the region of the rotator cuff tendons. There is AC degenerative change with a dominant projecting distal clavicular osteophyte. There are small marginal glenohumeral osteophytes. The bones are demineralized. The visualized right lung is clear. Right humerus: The bones are demineralized. A fracture is not identified. Bony demineralization. A fracture is not identified.      Xr Foot Left (min 3 Views)    Result Date: 1/4/2020  EXAMINATION: THREE XRAY VIEWS OF THE LEFT FOOT 1/4/2020 9:19 am COMPARISON: August 26, 2016 HISTORY: ORDERING of the visualized skull or soft tissues. No acute intracranial abnormality. Nonspecific white matter disease, likely due to chronic small vessel ischemia. Atrophy. Ct Cervical Spine Wo Contrast    Result Date: 1/4/2020  EXAMINATION: CT OF THE CERVICAL SPINE WITHOUT CONTRAST 1/4/2020 9:14 am TECHNIQUE: CT of the cervical spine was performed without the administration of intravenous contrast. Multiplanar reformatted images are provided for review. Dose modulation, iterative reconstruction, and/or weight based adjustment of the mA/kV was utilized to reduce the radiation dose to as low as reasonably achievable. COMPARISON: 09/02/2018 HISTORY: ORDERING SYSTEM PROVIDED HISTORY: trauma TECHNOLOGIST PROVIDED HISTORY: Reason for exam:->trauma Reason for Exam: fall with upper neck pain Acuity: Acute FINDINGS: BONES/ALIGNMENT: There is no acute fracture or traumatic malalignment. Mild anterolisthesis C7 on T1 appears degenerative in etiology. DEGENERATIVE CHANGES: Multilevel degenerative changes. SOFT TISSUES: There is no prevertebral soft tissue swelling. No acute abnormality of the cervical spine. Ct Lumbar Spine Wo Contrast    Result Date: 1/4/2020  EXAMINATION: CT OF THE LUMBAR SPINE WITHOUT CONTRAST  1/4/2020 TECHNIQUE: CT of the lumbar spine was performed without the administration of intravenous contrast. Multiplanar reformatted images are provided for review. Dose modulation, iterative reconstruction, and/or weight based adjustment of the mA/kV was utilized to reduce the radiation dose to as low as reasonably achievable. COMPARISON: None HISTORY: ORDERING SYSTEM PROVIDED HISTORY: pain, fall TECHNOLOGIST PROVIDED HISTORY: Reason for exam:->pain, fall Reason for Exam: pain, fall Acuity: Acute Type of Exam: Initial FINDINGS: There is no evidence of acute fracture or dislocation. The bones are demineralized. There is mild anterolisthesis of L4 on L5. There is mild multilevel degenerative disc disease.

## 2020-01-04 NOTE — ED PROVIDER NOTES
I independently examined and evaluated Rajiv Lara. In brief their history revealed patient had a fall and states she does have some pain to her right shoulder. .  States she is not exactly sure why she fell. He does states she has some new redness to her left shin that is been present for 2 days. States she made an appointment to see her family doctor in the next couple of days for it. Has an old chronic deformity to her left foot from a prior fracture and does have a large swollen area to the bottom of her foot that has been occasionally draining. . Patient is awake, alert, oriented x4 and speaks in full sentences. Mild tenderness to right shoulder. Soft abdomen. All diagnostic, treatment, and disposition decisions were made by myself in conjunction with the mid-level provider. Before disposition, questions were sought and answered with the patient. They have voiced understanding and agree with the plan: CBC shows normal white count. Hemoglobin is 12.4, does have mild left shift. INR is 1.89. CMP normal. Troponin normal. BNP normal. Lactic acid normal. INR is normal. .  Right shoulder x-ray shows no obvious fracture. Wrist x-ray does not show any acute finding. CT head get it for acute findings. CT c spine shows no acute findings. xr foot chronic Lisfranc fracture dislocations with progressive healing of multiple fracture deformities. No signs of osteomyelitis. Started on clindamycin for cellulitis of left lower extremity. Will be admitted for further evaluation and treatment. For all further details of the patient's emergency department visit, please see the mid-level practitioner's documentation. The Ekg interpreted by me shows  normal sinus rhythm with a rate of 64  Axis is   Normal  QTc is  normal  Intervals and Durations are unremarkable.       ST Segments: no acute change  No significant change from prior EKG dated 10-              Juan Alberto Ray MD  01/05/20 7128

## 2020-01-04 NOTE — ED PROVIDER NOTES
Excised Back Of Right Leg (2000's), Teeth missing, Urinary incontinence, Wears dentures, and Wears glasses. 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 Home medications:   Prior to Admission medications    Medication Sig Start Date End Date Taking? Authorizing Provider   pregabalin (LYRICA) 150 MG capsule Take 1 capsule by mouth 2 times daily. . 9/6/18 6/1/21 Yes Tammy Brown MD   amLODIPine (NORVASC) 10 MG tablet Take 1 tablet by mouth daily 9/7/18  Yes Tammy Brown MD   warfarin (COUMADIN) 5 MG tablet Take 5 mg by mouth daily   Yes Historical Provider, MD   acetaminophen (TYLENOL) 500 MG tablet Take 2 tablets by mouth every 6 hours as needed for Pain or Fever 8/24/16  Yes Javed Hermosillo MD   pantoprazole (PROTONIX) 40 MG tablet Take 1 tablet by mouth every morning 8/24/16  Yes Javed Hermosillo MD   Zoledronic Acid (RECLAST IV) Infuse intravenously   Yes Historical Provider, MD   atenolol (TENORMIN) 25 MG tablet Take 25 mg by mouth every morning    Yes Historical Provider, MD   furosemide (LASIX) 40 MG tablet Take 80 mg by mouth every morning    Yes Historical Provider, MD   docusate sodium (COLACE, DULCOLAX) 100 MG CAPS Take 100 mg by mouth 2 times daily 9/6/18   Tammy Brown MD       Social history:  reports that she has never smoked.  She has never used smokeless tobacco. She reports that she does not drink alcohol or use drugs. Family history:    Family History   Problem Relation Age of Onset    Kidney Disease Mother     Heart Disease Mother         Heart Attack     Early Death Father 44        \"[employer]\"    [facility] Heart Disease Sister         Heart Attack    Early Death Brother 46    Diabetes Brother     Early Death Sister 2    Cancer Brother         Prostate Cancer    Kidney Disease Brother     Diabetes Brother     Diabetes Brother     Heart Disease Brother     Diabetes Son          Exam  BP (!) 186/63   Pulse 72   Temp 98.6 °F (37 °C) (Oral)   Resp 16   Ht 5' 5\" (1.651 m)   Wt 209 lb (94.8 kg)   SpO2 97%   BMI 34.78 kg/m²   Nursing note and vitals reviewed. Constitutional: Well developed, well nourished. No acute distress. HENT:      Head: Normocephalic and atraumatic. Ears: External ears normal.      Nose: Nose normal.     Mouth: Membrane mucosa moist and pink. No posterior oropharynx erythema or tonsillar edema  Eyes: Anicteric sclera. No discharge, PERRL  Neck: Supple. Trachea midline. Cardiovascular: RRR, no murmurs, rubs, or gallops, radial pulses 2+ bilaterally. Pulmonary/Chest: Effort normal. No respiratory distress. CTAB. No stridor. No wheezes. No rales. Abdominal: Soft. Nontender to palpation. No distension. No guarding, rebound tenderness, or evidence of ascites. : No CVA tenderness. Musculoskeletal: Moves all extremities. There is diffuse tenderness to palpation of the right shoulder joint without step-off or deformity noted. No tenderness palpation of the cervical, thoracic, lumbar spine or paraspinal muscles. Pelvis stable and nontender. Significant deformity of the left foot noted particularly of the midfoot, patient reports is chronic and unchanged. There is a large area of swelling noted over the plantar aspect with central lesion, no discharge. Neurological: Alert and oriented to person, place, and time. Normal muscle tone.    Cranial nerves II through XII intact bilaterally. Bilateral upper extremities strength and sensation intact bilaterally. Left lower extremity strength is 5 out of 5, right lower extremity strength is 4 out of 5, sensation light touch intact in the bilateral legs. Skin: Warm and dry. There is a large area of bright red erythema that is warm to the touch and slightly tender to palpation noted over the left shin. Psychiatric: Normal mood and affect. Behavior is normal.      EKG   Please see Dr. Mary Pizano note for EKG read. Radiographs (if obtained):  [] The following radiograph was interpreted by myself in the absence of a radiologist:   [x] Radiologist's Report Reviewed:  CT LUMBAR SPINE WO CONTRAST   Preliminary Result   Osteopenia. No evidence of acute fracture or dislocation. Mild multilevel degenerative change. Mild dextrocurvature. XR PELVIS (1-2 VIEWS)   Final Result   No acute osseous abnormality. XR CHEST STANDARD (2 VW)   Final Result   No acute process. CT Cervical Spine WO Contrast   Final Result   No acute abnormality of the cervical spine. CT Head WO Contrast   Preliminary Result   No acute intracranial abnormality. Nonspecific white matter disease, likely due to chronic small vessel ischemia. Atrophy. XR FOOT LEFT (MIN 3 VIEWS)   Preliminary Result   Lisfranc fracture dislocations with progressive healing of the multiple   fracture deformities. No radiographic findings of osteomyelitis. XR HUMERUS RIGHT (MIN 2 VIEWS)   Preliminary Result   Bony demineralization. A fracture is not identified. XR SHOULDER RIGHT (MIN 2 VIEWS)   Preliminary Result   Bony demineralization. A fracture is not identified.                 Labs  Results for orders placed or performed during the hospital encounter of 01/04/20   CBC Auto Differential   Result Value Ref Range    WBC 10.4 4.0 - 10.5 K/CU MM    RBC 4.51 4.2 - 5.4 M/CU MM    Hemoglobin 12.4 (L) 12.5 - 16.0 GM/DL SpO2 97 %, not currently breastfeeding. Disposition:  Admit to telemetry in stable condition. Patient was given scripts for the following medications. I counseled patient how to take these medications. New Prescriptions    No medications on file       This chart was generated using the [address] [place], [place] [address]  dictation system. I created this record but it may contain dictation errors given the limitations of this technology.        [facility]  01/04/20 6729

## 2020-01-04 NOTE — ED TRIAGE NOTES
A/O  patient fell and injured her right shoulder which resulted in some right side deformity. Patient has her arm held in a position of comfort. PMS intact distal to the injury.

## 2020-01-04 NOTE — ED NOTES
Bed: ED-25  Expected date:   Expected time:   Means of arrival:   Comments:  Ems 88 f- fall     [facility]  01/04/20 5135

## 2020-01-05 NOTE — CONSULTS
[address], 5/11/1931, 1114/1114-A, 1/5/2020    Discharge Recommendation: SNF ([facility])    Equipment: defer    History  [place]:  The primary encounter diagnosis was Cellulitis of left lower extremity. Diagnoses of Fall, initial encounter, Chronic midline low back pain without sciatica, Acute pain of right shoulder, and Right leg weakness were also pertinent to this visit. Subjective:  Patient states:  Agreeable to PT evaluation. C/o right shoulder pain. Reports multiple falls    Pain:  Mostly right shoulder with activity. Communication with other providers: RN, CM  Restrictions: fall risk    Home Setup/Prior level of function  Social/Functional History  Lives With: Alone  Type of Home: (condo)  Home Layout: One level  Home Access: Level entry  Bathroom Shower/Tub: Tub/Shower unit  Bathroom Toilet: Handicap height  Bathroom Equipment: Grab bars in shower, Shower chair  Home Equipment: Rolling walker, 4 wheeled walker  ADL Assistance: Independent  Homemaking Assistance: Independent  Active : No  Additional Comments: stops by daily--groceries, laundry; approx 5 falls in past 1 year     Examination of body systems (includes body structures/functions, activity/participation limitations):  · Observation:  Supine in bed upon arrival, severe charcot foot deformities, worse in left  · Vision:  [employer] [place]  · [facility] [place]  · Cardiopulmonary:  VSS  · Cognition: WFL, see OT/SLP note for further evaluation. Body Structures/Function  · ROM R/L:  Ankle ROM impairments d/t charcot, right shoulder elevation is limited to 20 degrees  · Strength R/L:  3+/5 grossly, limited by pain, weakness observed in function. · Neuro:  Chronic distal symmetrical sensory impairment      Mobility:  · Rolling L/R:  Min A   · Supine to sit:  Min A-- unable to complete left roll without physical assist d/t right shoulder pain.  Trunk boost and seated scooting assist needed to come to EOB   · Transfers: CGA at RW-- hand placement cues, increased reliance on device for support  · Sitting balance:  SBA. · Standing balance:  CGA with RW-- pt relies on increased DL support time and UE's d/t chronic sensory deficits. Consistent mild sway with dynamic standing activity     · Gait: CGA 40 ft with RW-- very slow, hesitant, downward gaze, discontinuous steps through turns, fatigues quickly, increasing right shoulder pain. TCs for posture    [facility] 6 Clicks Inpatient Mobility:   Current: 17/24  PLOF: 24/24    Treatment:  Therapeutic Activity Training:   Therapeutic activity training was instructed today. Cues were given for safety, sequence, UE/LE placement, awareness, and balance. Activities performed today included bed mobility training, sup-sit, sit-stand, SPT. Gait Training:  Cues were given for safety, sequence, device management, balance, posture, awareness, path. chriotherapy instructions provided for R shoulder  Safety:  patient left in chair with chair alarm, call light within reach, RN notified, gait belt used. Assessment:  Pt is a 81 yo female who presents to [facility] with fall, LLE cellulitis, charcot foot. Pt lives at home alone with daily visits from dtr. The pt has had several recent falls. She exhibits decreased strength, dynamic balance, and activity tolerance. Right shoulder x-rays negative but pt with persistent pain and dysfunction with right UE ROM. She will benefit from cont skilled PT and dc to SNF when medically cleared. May need to consider higher level of care or increased supervision from family after completing rehab  Activity Tolerance: fair+  Complexity: Moderate  Prognosis: Good, for goals, limited by charcot deformities, sensory impairment   Plan  days/week: 3+/ 1 week         Goals:  1. Pt will mobilize in bed sup<-> sit at CGA  2. Pt will sit<-> stand with LRAD at SBA  3. Pt will ambulate 100 ft with LRAD at SBA  4.  Pt will increase R shoulder elevation

## 2020-01-05 NOTE — CONSULTS
[facility] [place] [address].   Consult Note      Reason for Consult:  Left Foot Deformity   Requesting Physician:  Ernesto Silva    IMPRESSION/RECOMMENDATIONS:      Charcot Left Foot  -Reviewed x-rays  -No plans for surgical intervention at this time  -Continue with foam padding to the plantar lesion    Cellulitis Left Leg  - Arterial Doppler ordered     Comorbids: Per internal medicine     Overall: This 80-year-old female with history of Charcot neuropathy initially admitted for postural dizziness with near syncope the with a fall was seen this morning. I discussed with the patient about continuing to utilize her specialized shoes for her left foot Charcot. I discussed with the patient about potential for debridement to the blister but at this time the blister Has remained closed. I have no surgical plans for this patient at this time. I recommended to the patient about physical therapy and extended care facility to ensure she has a strength to continue living on her own. Patient states that she will consider the extended care facility. I recommend that the patient does follow-up with the wound care center at [facility] once she is discharged. We may be able to appropriately fit her with new bracing for the left lower extremity. 50% of the encounter was spent educating the patient on her diagnosis and treatment plan. CHIEF COMPLAINT: Fall with right shoulder pain    HISTORY OF PRESENT ILLNESS:                The patient is a 80 y.o. female with significant past medical history of syncope, falls who presents with Charcot-like left foot joint was seen this morning. Patient admits to a little dizziness and a little nausea this morning. Patient denies any fevers, chills. Patient states that she does not have any pain to the left lower extremity.   Patient states that she is currently being treated for cellulitis for the left leg as well as treated for her right shoulder pain due to splitter        sodium chloride flush  10 mL Intravenous 2 times per day    amLODIPine  10 mg Oral Daily    atenolol  25 mg Oral QAM    docusate sodium  100 mg Oral BID    furosemide  80 mg Oral QAM    pantoprazole  40 mg Oral QAM    pregabalin  150 mg Oral BID    warfarin  5 mg Oral Daily     Continuous Infusions:  PRN Meds:.sodium chloride flush, magnesium hydroxide, ondansetron, acetaminophen, traMADol, hydrALAZINE (APRESOLINE) ivpb    Allergies:  Percocet [oxycodone-acetaminophen]; Phenergan [promethazine hcl]; and Tape Rhodia Favia tape]    Social History:    Social History     Socioeconomic History    Marital status:       Spouse name: Not on file    Number of children: Not on file    Years of education: Not on file    Highest education level: Not on file   Occupational History    Not on file   Social Needs    Financial resource strain: Not on file    Food insecurity:     Worry: Not on file     Inability: Not on file    Transportation needs:     Medical: Not on file     Non-medical: Not on file   Tobacco Use    Smoking status: Never Smoker    Smokeless tobacco: Never Used   Substance and Sexual Activity    Alcohol use: No    Drug use: No    Sexual activity: Never   Lifestyle    Physical activity:     Days per week: Not on file     Minutes per session: Not on file    Stress: Not on file   Relationships    Social connections:     Talks on phone: Not on file     Gets together: Not on file     Attends [employer] service: Not on file     Active member of club or organization: Not on file     Attends meetings of clubs or organizations: Not on file     Relationship status: Not on file    Intimate partner violence:     Fear of current or ex partner: Not on file     Emotionally abused: Not on file     Physically abused: Not on file     Forced sexual activity: Not on file   Other Topics Concern    Not on file   Social History Narrative    Not on file     Family History:   Family History

## 2020-01-05 NOTE — DISCHARGE INSTR - COC
Continuity of Care Form    Patient Name: Dilia Billings   :  1931  MRN:  [id number]    Admit date:  2020  Discharge date:  ***    Code Status Order: DNR-CCA   Advance Directives:   [facility] Documentation     Date/Time Healthcare Directive Type of Healthcare Directive Copy in [facility] [address] Agent's Name Healthcare Agent's Phone Number    20 1836  No, patient does not have an advance directive for healthcare treatment -- -- -- -- --          Admitting Physician:  Pauly Brooke MD  PCP: Mayelin Rowe MD    Discharging Nurse: [facility] Unit/Room#: 1114/1114-A  Discharging Unit Phone Number: ***    Emergency Contact:   Extended Emergency Contact Information  Primary Emergency Contact: Rupeshedilberto Feldman  Address: [address] [place] [address] Phone: [phone]  Mobile Phone: [phone]  Relation: Child    Past Surgical History:  Past Surgical History:   Procedure Laterality Date    APPENDECTOMY  194    BLADDER SUSPENSION      BREAST CYST EXCISION Right     Benign    COLONOSCOPY  Last Done In     Polpys Removed In Past    DENTAL SURGERY      Teeth Extracted In Past    DILATION AND CURETTAGE OF UTERUS      \"2 Or 3 \" Prior To DUANE In     FRACTURE SURGERY Left 12    Broken Left Femur With Hardware    HYSTERECTOMY, TOTAL ABDOMINAL      JOINT REPLACEMENT Right 14    Total Right Knee    OTHER SURGICAL HISTORY  11/23/15    Robotic Assisted Laparoscopic Sacrocolpopexy    OTHER SURGICAL HISTORY Right 2018    orif right patella    OVARIAN CYST SURGERY Right     \"Right\"    SKIN CANCER EXCISION      Skin Cancer Excised Back Of Right Leg    TOE AMPUTATION Right  Or     Right Foot Little Toe    TOE AMPUTATION Right 13    Second Toe    TONSILLECTOMY      TOTAL HIP ARTHROPLASTY Right 2016       Immunization History:   Immunization History   Administered Date(s) Administered    Pneumococcal Conjugate 7-valent (Lashonda Matute) 2007       Active Problems:  Patient Active Problem List   Diagnosis Code    Closed fracture of right hip ([facility] [address] S72.001A    HTN (hypertension) I10    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 Pamela Alcantara, Y92.009    Left skew foot deformity M21.6X2    Postural dizziness with near syncope R42, R55    Fall at home, sequela W19. Alejos Angelucci, Y92.009       Isolation/Infection:   Isolation          No Isolation        Patient Infection Status     None to display          Nurse Assessment:  Last Vital Signs: BP (!) 175/79   Pulse 79   Temp 97.1 °F (36.2 °C) (Oral)   Resp 18   Ht 5' 5\" (1.651 m)   Wt 209 lb (94.8 kg)   SpO2 93%   BMI 34.78 kg/m²     Last documented pain score (0-10 scale): Pain Level: 5  Last Weight:   Wt Readings from Last 1 Encounters:   20 209 lb (94.8 kg)     Mental Status:  {IP PT MENTAL STATUS:77746}    IV Access:  { ARIELLE IV ACCESS:966591658}    Nursing Mobility/ADLs:  Walking   {[facility] MABF:218895093}  Transfer  {[facility] TDSN:667366293}  Bathing  {[facility] VCJM:454290834}  Dressing  {[facility] ZFZJ:696942775}  Toileting  {[facility] WKXW:052641193}  Feeding  {[facility] CFP}  Med Admin  {[facility] PUGM:393955807}  Med Delivery   { [facility] MED Delivery:826112104}    Wound Care Documentation and Therapy:  Pressure Ulcer 16 Foot Left;Plantar wound bed is beefy red in the center with yellow slough at the edges with rednees going up the leg  (Active)   Emily-Wound Texture Excoriation 2020  9:32 PM   Emily-Wound Color Erythema;Pale;Red; Other

## 2020-01-05 NOTE — CARE COORDINATION
Consult received. Patient is working with PT/OT for assessment of needs. Patient has been to [facility] in the past. Discussed availability. Patient lives at [facility]; independent PTA. Has 2 children- miguel local, son Israel. Has PCP and insurance. Anticipate SNF need- [facility] 1st or [facility] 2nd with Katie rm preference. CM will follow on 1/6 with continued discussion.  Shimon Andres RN

## 2020-01-05 NOTE — PROGRESS NOTES
This nurse attempted to take orthostatic BP on patient, but pt did not want to stand up at this time due to pain, and cyst on foot.

## 2020-01-05 NOTE — CARE COORDINATION
Cm attempted to contact [facility] admissions with voice mail message left re; referral and requesting return call re; the same.

## 2020-01-05 NOTE — PROGRESS NOTES
Lying /87, sitting /87 pt did not want to stand for orthostatic BP due to pain, and cyst on bottom of foot.

## 2020-01-05 NOTE — PROGRESS NOTES
Hospitalist Progress Note      Name:  Dilia Billings /Age/Sex: 1931  (80 y.o. female)   MRN & CSN:  [id number] & [id number] Admission Date/Time: 2020  8:26 AM   Location:  [address] [place] [address] PCP: Mayelin Rowe MD         Hospital Day: 2    Assessment and Plan:   Dilia Billings is a 80 y.o.  female  who presents with Postural dizziness with near syncope    1. Fall, Mechanical: has several falls in the last 6 months  · Has right shoulder and knee pain  · Trauma series -ve for fracture  · Pain control, PT/OT  · Orthostatic -ve  2. Cellulitis, Left Leg  · Afebrile, WBC wnl. Significant tenderness left leg  · Continue antibiotic   3. Charcot Left Foot: Podiatry recommended conservative management  4. Hypertension: BP uncontrolled overnight. Refused to take Furo this morning. 5. H/O DVT: continue Coumadin, may need to be DC because of frequent falling    Diet DIET GENERAL;   DVT Prophylaxis [] Lovenox, []  Heparin, [] SCDs, [x] Warfarin  [] NOAC     GI Prophylaxis [x] PPI,  [] H2 Blocker,  [] Carafate,  [] Diet/Tube Feeds   Code Status DNR-CCA   MDM [] Low, [x] Moderate,[]  High     History of Present Illness:     Chief Complaint: Postural dizziness with near syncope    Seen and examined. Complaining of pain on the right shoulder. No fever or chills. Has pain on the left leg     Ten point ROS reviewed negative, unless as noted above    Objective: Intake/Output Summary (Last 24 hours) at 2020 1108  Last data filed at 2020 0846  Gross per 24 hour   Intake 290 ml   Output --   Net 290 ml      Vitals:   Vitals:    20 0843   BP: (!) 175/79   Pulse: 79   Resp: 18   Temp: 97.1 °F (36.2 °C)   SpO2: 93%     Physical Exam:   GEN Awake female, sitting upright in bed in no apparent distress. Appears given age. EYES Pupils are equally round. No scleral erythema, discharge, or conjunctivitis. HENT Mucous membranes are moist. Oral pharynx without exudates, no evidence of thrush.   NECK Supple, no apparent

## 2020-01-06 NOTE — PROGRESS NOTES
Nutrition Assessment    Type and Reason for Visit: Initial, Positive Nutrition Screen(wound)    Nutrition Recommendations:   Continue general diet at this time  Encourage consistent meal intake  Offer oral nutrition supplement as needed/accepted, low calorie high protein ensure     Nutrition Assessment: Admit after fall with dizziness. Skin issues noted-skin tear on arm, cellulitis. Followed by podiatry from [facility]. Currently on general diet, limited po data at this time. No reported weight loss, current BMI over 30. Low nutrition risk at this time.      Malnutrition Assessment:  · Malnutrition Status: Insufficient data  · Context: Acute illness or injury    Nutrition Risk Level: Low    Nutrient Needs:  · Estimated Daily Total Kcal: 5750-5574 (15-18 kcal/kg current weight)   · Estimated Daily Protein (g): 67-78 (1.2-1.4 g/kg IBW)   · Estimated Daily Total Fluid (ml/day): 1700 (1ml/skyler)     Nutrition Diagnosis:   · Problem: No nutrition diagnosis at this time    Objective Information:  · Nutrition-Focused Physical Findings: covered up asleep in bed on visit   · Wound Type: Skin Tears  · Current Nutrition Therapies:  · Oral Diet Orders: General   · Oral Diet intake: Unable to assess  · Oral Nutrition Supplement (ONS) Orders: None  · ONS intake:    · Anthropometric Measures:  · Ht: 5' 5\" (165.1 cm)   · Current Body Wt: 203 lb 14.8 oz (92.5 kg)  · Admission Body Wt:    · Usual Body Wt:    · % Weight Change:  ,  no loss reported   · Ideal Body Wt: 125 lb (56.7 kg), % Ideal Body 162  · BMI Classification: BMI 30.0 - 34.9 Obese Class I(BMI-34)    Nutrition Interventions:   Continue current diet  Continued Inpatient Monitoring, Education not appropriate at this time, Coordination of Care    Nutrition Evaluation:   · Evaluation: Goals set   · Goals: Patient will consume at least 50-75% at meals during stay     · Monitoring: Meal Intake, Weight, Pertinent Labs, Diet Tolerance      Electronically signed by Tommie Wesley Narda Chacon, [address] [place] [address],  on 1/6/20 at 12:48 PM    Contact Number: [phone]

## 2020-01-06 NOTE — DISCHARGE SUMMARY
Discharge Summary    Name:  Rajiv Lara /Age/Sex: 1931  (80 y.o. female)   MRN & CSN:  [id number] & [id number] Admission Date/Time: 2020  8:26 AM   Attending:  Ayala Ann MD Discharging Physician: Ayala Ann MD     Hospital Course:   Rajiv Lara is a 80 y.o.  female  who presents with Postural dizziness with near syncope    1. Fall, Mechanical: has several falls in the last 6 months  · Has right shoulder and knee pain  · Trauma series -ve for fracture  · Pain control, PT/OT  · Orthostatic -ve  2. Cellulitis, Left Leg  · Afebrile, WBC wnl. Significant tenderness left leg  · Continue antibiotic  DC on Cephalexin  3. Charcot Left Foot: Podiatry recommended conservative management  4. Hypertension: BP uncontrolled overnight. Refused to take Furo this morning. 5. H/O DVT: continue Coumadin, may need to be DC because of frequent falling    The patient expressed appropriate understanding of and agreement with the discharge recommendations, medications, and plan.      Consults this admission:  IP CONSULT TO HOSPITALIST  IP CONSULT TO PODIATRY  IP CONSULT TO CASE MANAGEMENT    Discharge Instruction:   Follow up appointments: Podiatry  Primary care physician:  within 2 weeks    Diet:  cardiac diet   Activity: activity as tolerated  Disposition: Discharged to:   []Home, []C, [x]SNF, []Acute Rehab, []Hospice   Condition on discharge: Stable    Discharge Medications:      Federica Frazier   Home Medication Instructions CPI:[id number]    Printed on:20 1258   Medication Information                      acetaminophen (TYLENOL) 500 MG tablet  Take 2 tablets by mouth every 6 hours as needed for Pain or Fever             amLODIPine (NORVASC) 10 MG tablet  Take 1 tablet by mouth daily             atenolol (TENORMIN) 25 MG tablet  Take 25 mg by mouth every morning              cephALEXin (KEFLEX) 500 MG capsule  Take 1 capsule by mouth 4 times daily for 5 days             docusate sodium (COLACE, right lung is clear. Right humerus: The bones are demineralized. A fracture is not identified. Bony demineralization. A fracture is not identified. Xr Knee Right (1-2 Views)    Result Date: 1/4/2020  EXAMINATION: TWO XRAY VIEWS OF THE RIGHT KNEE 1/4/2020 5:02 pm COMPARISON: Right knee radiograph 09/02/2018 HISTORY: ORDERING SYSTEM PROVIDED HISTORY: right knee pain TECHNOLOGIST PROVIDED HISTORY: Reason for exam:->right knee pain Reason for Exam: right knee pain Acuity: Acute Type of Exam: Initial FINDINGS: New changes of percutaneous pinning of the patella with some cartilage wires, demonstrating no evident complication and healing of the previously seen fracture. Previously seen changes of total knee arthroplasty with no evident complication. No acute fracture. Anatomic alignment of the native proximal tibiofibular joint. Possible trace suprapatellar effusion. Questionable heterotopic ossification versus intra-articular loose body in the suprapatellar recess. No obvious acute soft tissue abnormality. Patchy atherosclerotic calcifications. 1. No acute findings in the right knee. 2. Questionable trace left suprapatellar effusion. Calcification in this area could represent a an intra-articular loose body versus heterotopic ossification from adjacent postoperative changes. 3. Postoperative changes as above with no evident complication. Xr Foot Left (min 3 Views)    Result Date: 1/6/2020  EXAMINATION: THREE XRAY VIEWS OF THE LEFT FOOT 1/4/2020 9:19 am COMPARISON: August 26, 2016 HISTORY: ORDERING SYSTEM PROVIDED HISTORY: pain, wound on plantar foot TECHNOLOGIST PROVIDED HISTORY: Reason for exam:->pain, wound on plantar foot Reason for Exam: pain, wound on plantar foot Acuity: Unknown Type of Exam: Initial Additional signs and symptoms: ulcer? on plantar foot FINDINGS: Redemonstration to Lisfranc fracture dislocations with progressive healing of the multiple metatarsal fractures.   The alignment of the dislocations are stable. No new acute fracture or dislocation. There is collapse of the midfoot. No radiographic findings of osteomyelitis. Lisfranc fracture dislocations with progressive healing of the multiple fracture deformities. No radiographic findings of osteomyelitis. Ct Head Wo Contrast    Result Date: 1/6/2020  EXAMINATION: CT OF THE HEAD WITHOUT CONTRAST  1/4/2020 9:14 am TECHNIQUE: CT of the head was performed without the administration of intravenous contrast. Dose modulation, iterative reconstruction, and/or weight based adjustment of the mA/kV was utilized to reduce the radiation dose to as low as reasonably achievable. COMPARISON: 10/26/2018 HISTORY: ORDERING SYSTEM PROVIDED HISTORY: leg weakness TECHNOLOGIST PROVIDED HISTORY: Acuity: Acute; ORDERING SYSTEM PROVIDED HISTORY: trauma TECHNOLOGIST PROVIDED HISTORY: Reason for exam:->trauma Reason for Exam: fall with upper neck pain Acuity: Acute FINDINGS: BRAIN/VENTRICLES: There is no CT evidence of acute intracranial hemorrhage, mass or mass effect. There is low attenuation within the periventricular and subcortical white matter. Age related atrophy is identified. There is mild enlargement of the 3rd and lateral ventricles. Calcifications are noted in the basal ganglia. The 4th ventricle is in the midline. The basilar cisterns are preserved. ORBITS: The patient is status post cataract surgery. SINUSES: The visualized paranasal sinuses and mastoid air cells demonstrate no acute abnormality. SOFT TISSUES/SKULL:  No acute abnormality of the visualized skull or soft tissues. No acute intracranial abnormality. Nonspecific white matter disease, likely due to chronic small vessel ischemia. Atrophy.      Ct Cervical Spine Wo Contrast    Result Date: 1/4/2020  EXAMINATION: CT OF THE CERVICAL SPINE WITHOUT CONTRAST 1/4/2020 9:14 am TECHNIQUE: CT of the cervical spine was performed without the administration of intravenous contrast. Multiplanar reformatted images are provided for review. Dose modulation, iterative reconstruction, and/or weight based adjustment of the mA/kV was utilized to reduce the radiation dose to as low as reasonably achievable. COMPARISON: 09/02/2018 HISTORY: ORDERING SYSTEM PROVIDED HISTORY: trauma TECHNOLOGIST PROVIDED HISTORY: Reason for exam:->trauma Reason for Exam: fall with upper neck pain Acuity: Acute FINDINGS: BONES/ALIGNMENT: There is no acute fracture or traumatic malalignment. Mild anterolisthesis C7 on T1 appears degenerative in etiology. DEGENERATIVE CHANGES: Multilevel degenerative changes. SOFT TISSUES: There is no prevertebral soft tissue swelling. No acute abnormality of the cervical spine. Ct Lumbar Spine Wo Contrast    Result Date: 1/6/2020  EXAMINATION: CT OF THE LUMBAR SPINE WITHOUT CONTRAST  1/4/2020 TECHNIQUE: CT of the lumbar spine was performed without the administration of intravenous contrast. Multiplanar reformatted images are provided for review. Dose modulation, iterative reconstruction, and/or weight based adjustment of the mA/kV was utilized to reduce the radiation dose to as low as reasonably achievable. COMPARISON: None HISTORY: ORDERING SYSTEM PROVIDED HISTORY: pain, fall TECHNOLOGIST PROVIDED HISTORY: Reason for exam:->pain, fall Reason for Exam: pain, fall Acuity: Acute Type of Exam: Initial FINDINGS: There is no evidence of acute fracture or dislocation. The bones are demineralized. There is mild anterolisthesis of L4 on L5. There is mild multilevel degenerative disc disease. Vacuum disc phenomenon is noted at multiple levels. There is mild multilevel facet degenerative change. There is no spondylolysis or spondylolisthesis. There is mild dextrocurvature of the lumbar spine centered at L1-L2. The sacroiliac joints are intact. There are old bilateral posterior 12th rib fractures. Atherosclerosis is noted. There is nonobstructing left renal calculus.  Motion degrades the quality of the exam.  Diverticulosis is present. The prevertebral soft tissues are unremarkable. Right hip hardware is partially imaged. Osteopenia. No evidence of acute fracture or dislocation. Mild multilevel degenerative change. Mild dextrocurvature. Vl Dup Carotid Bilateral    Result Date: 1/4/2020  EXAMINATION: ULTRASOUND EVALUATION OF THE CAROTID ARTERIES 1/4/2020 5:16 pm COMPARISON: Cervical spine CT 01/04/2020 HISTORY: ORDERING SYSTEM PROVIDED HISTORY: syncope TECHNOLOGIST PROVIDED HISTORY: Reason for exam:->syncope Reason for Exam: syncope Acuity: Unknown Type of Exam: Initial FINDINGS: Patent common, internal, and external carotid arteries and vertebral arteries with antegrade flow. Minimal atherosclerotic plaque in the carotid bulbs. Peak systolic velocities as below: RIGHT Common carotid:  77 cm/s Carotid bulb:  66 cm/s Internal carotid (proximal):  40 cm/s Internal carotid (mid):  59 cm/s Internal carotid (distal):  98 cm/s External carotid:  59 cm/s Vertebral:  28 cm/s ICA/CCA ratio:  1.3 LEFT Common carotid:  70 cm/s Carotid bulb:  47 cm/s Internal carotid (proximal):  32 cm/s Internal carotid (mid):  55 cm/s Internal carotid (distal):  57 cm/s External carotid:  54 cm/s Vertebral:  37 cm/s ICA/CCA ratio:  0.8     1. No hemodynamically significant stenoses in the internal carotid arteries. 2. Patent vertebral arteries with antegrade flow.      Electronically signed by Ayala Ann MD on 1/6/2020 at 12:52 PM

## 2020-02-02 PROBLEM — R29.6 FREQUENT FALLS: Status: ACTIVE | Noted: 2020-01-01

## 2020-02-02 PROBLEM — G93.41 ACUTE METABOLIC ENCEPHALOPATHY: Status: ACTIVE | Noted: 2020-01-01

## 2020-02-02 PROBLEM — E66.09 CLASS 1 OBESITY DUE TO EXCESS CALORIES WITH BODY MASS INDEX (BMI) OF 33.0 TO 33.9 IN ADULT: Status: ACTIVE | Noted: 2020-01-01
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
## 2020-02-02 PROBLEM — N17.9 ACUTE KIDNEY INJURY (HCC): Status: ACTIVE | Noted: 2020-01-01
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
## 2020-02-02 NOTE — ED NOTES
Bed: H-01  Expected date:   Expected time:   Means of arrival:   Comments:  RYAN Simmons  02/02/20 [id number]

## 2020-02-02 NOTE — ED PROVIDER NOTES
[facility]      TRIAGE CHIEF COMPLAINT:   Fall (Unknown amout of time last seen 1000 this morning. Pt doesn't remeber falling. Unknown LOC or hitting head. Takes Coumadin.)      [place]:  Dilia Billings is a 80 y.o. female that presents complaint of fall. Patient lives at home she is on Coumadin family found her on the ground today crawling around patient states she fell does not member what happened she denies any current symptoms other than her chronic pain nothing new denies any headache chest pain shortness of breath cough abdominal pain back pain that is new she has chronic pain. Patient states she is in the hospital recently being treated for left leg cellulitis denies other questions or concerns. REVIEW OF SYSTEMS:  At least 10 systems reviewed and otherwise acutely negative except as in the [address] Review of Systems   Constitutional: Positive for fatigue. HENT: Negative. Eyes: Negative. Respiratory: Negative. Cardiovascular: Negative. Gastrointestinal: Negative. Endocrine: Negative. Genitourinary: Negative. Musculoskeletal: Positive for arthralgias and myalgias. Skin: Negative. Allergic/Immunologic: Negative. Neurological: Negative. Hematological: Negative. Psychiatric/Behavioral: Negative. All other systems reviewed and are negative.       Past Medical History:   Diagnosis Date    Acid reflux     Anesthesia     \"Combative After Anesthesia\"    Arthritis     \"All Over My Body\"    Diverticulosis     HTN (hypertension)     Hx of blood clots 2000's    \"Behind Right Knee\"    Kidney stones     Passed Kidney Stones In 1970's    Lower back pain     \"Sometimes\"    Neuropathy     bilateral feet    Osteoporosis     IV Reclast Once A Year, Last Dose Received In 2014    Purpura ([address]     \"Arms\"    Shingles     \"Twice, First Time  In 1972 Left Breast Area, Second Time In 2000 Right Lower Abdomen\"    Skin Cancer Excised Back Of Right Leg 2000's  Teeth missing     Upper And Lower    Urinary incontinence     Wears dentures     Full Upper    Wears glasses      Past Surgical History:   Procedure Laterality Date    APPENDECTOMY  1941    BLADDER SUSPENSION  1990's    BREAST CYST EXCISION Right 1955    Benign    COLONOSCOPY  Last Done In 2000's    Polpys Removed In Past    DENTAL SURGERY      Teeth Extracted In Past    DILATION AND CURETTAGE OF UTERUS      \"2 Or 3 \" Prior To DUANE In 1970's    FRACTURE SURGERY Left 7-7-12    Broken Left Femur With Hardware    HYSTERECTOMY, TOTAL ABDOMINAL  1970's    JOINT REPLACEMENT Right 12-2-14    Total Right Knee    OTHER SURGICAL HISTORY  11/23/15    Robotic Assisted Laparoscopic Sacrocolpopexy    OTHER SURGICAL HISTORY Right 09/04/2018    orif right patella    OVARIAN CYST SURGERY Right 1957    \"Right\"    SKIN CANCER EXCISION  2000's    Skin Cancer Excised Back Of Right Leg    TOE AMPUTATION Right 1990's Or 2000's    Right Foot Little Toe    TOE AMPUTATION Right 5-23-13    Second Toe    TONSILLECTOMY  1962    TOTAL HIP ARTHROPLASTY Right 08/21/2016     Family History   Problem Relation Age of Onset    Kidney Disease Mother     Heart Disease Mother         Heart Attack     Early Death Father 44        \"Muster Gas\"   [de-identified] Heart Disease Sister         Heart Attack    Early Death Brother 46    Diabetes Brother     Early Death Sister 2    Cancer Brother         Prostate Cancer    Kidney Disease Brother     Diabetes Brother     Diabetes Brother     Heart Disease Brother     Diabetes Son      Social History     Socioeconomic History    Marital status:       Spouse name: Not on file    Number of children: Not on file    Years of education: Not on file    Highest education level: Not on file   Occupational History    Not on file   Social Needs    Financial resource strain: Not on file    Food insecurity:     Worry: Not on file     Inability: Not on file    Transportation needs: [Oxycodone-Acetaminophen]      \"I Get Got Crazy And Mean\"    Phenergan [Promethazine Hcl]      \"Violent Behavior\"      Tape [Adhesive Tape]      \"Redness And Tears The Skin , Paper Tape Is Ok To Use\"     No current facility-administered medications for this encounter. Current Outpatient Medications   Medication Sig Dispense Refill    lidocaine 4 % external patch Place 1 patch onto the skin daily 7 patch 0    docusate sodium (COLACE, DULCOLAX) 100 MG CAPS Take 100 mg by mouth 2 times daily 30 capsule 0    pregabalin (LYRICA) 150 MG capsule Take 1 capsule by mouth 2 times daily. . 60 capsule 0    amLODIPine (NORVASC) 10 MG tablet Take 1 tablet by mouth daily 30 tablet 3    warfarin (COUMADIN) 5 MG tablet Take 5 mg by mouth daily      acetaminophen (TYLENOL) 500 MG tablet Take 2 tablets by mouth every 6 hours as needed for Pain or Fever 120 tablet 3    pantoprazole (PROTONIX) 40 MG tablet Take 1 tablet by mouth every morning 30 tablet 3    Zoledronic Acid (RECLAST IV) Infuse intravenously      atenolol (TENORMIN) 25 MG tablet Take 25 mg by mouth every morning       furosemide (LASIX) 40 MG tablet Take 80 mg by mouth every morning          Nursing Notes Reviewed    VITAL SIGNS:  ED Triage Vitals   Enc Vitals Group      BP       Pulse       Resp       Temp       Temp src       SpO2       Weight       Height       Head Circumference       Peak Flow       Pain Score       Pain Loc       Pain Edu? Excl. in [address]? PHYSICAL EXAM:  Physical Exam  Vitals signs and nursing note reviewed. Constitutional:       General: She is not in acute distress. Appearance: Normal appearance. She is well-developed and well-groomed. She is not ill-appearing, toxic-appearing or diaphoretic. HENT:      Head: Normocephalic and atraumatic. Right Ear: External ear normal.      Left Ear: External ear normal.      Nose: Nose normal. No congestion or rhinorrhea.       Mouth/Throat:      Mouth: Mucous membranes are intact. No sensory deficit. Motor: Motor function is intact. No weakness, tremor, atrophy, abnormal muscle tone or seizure activity. Coordination: Coordination is intact. Coordination normal.   Psychiatric:         Mood and Affect: Mood normal.         Behavior: Behavior normal. Behavior is cooperative. Thought Content: Thought content normal.         Judgment: Judgment normal.           I have reviewed andinterpreted all of the currently available lab results from this visit (if applicable):    No results found for this visit on 02/02/20. Radiographs (if obtained):  [] The following radiograph was interpreted by myself in the absence of a radiologist:  [x] Radiologist's Report Reviewed:    CXR. CT Brain/C spine, CXR, pelvis Xray    Xr Chest Standard (2 Vw)    Result Date: 1/4/2020  EXAMINATION: TWO XRAY VIEWS OF THE CHEST 1/4/2020 11:08 am COMPARISON: 09/02/2018 HISTORY: ORDERING SYSTEM PROVIDED HISTORY: Chest pain TECHNOLOGIST PROVIDED HISTORY: Reason for exam:->Chest pain Reason for Exam: chest pain;fall Acuity: Acute Type of Exam: Initial Mechanism of Injury: fell in closet today,back pain (cxr fall ER protocol) FINDINGS: The lungs are without acute focal process. There is no effusion or pneumothorax. The cardiomediastinal silhouette is stable. The osseous structures are stable. No acute process. Xr Pelvis (1-2 Views)    Result Date: 1/4/2020  EXAMINATION: ONE XRAY VIEW OF THE PELVIS 1/4/2020 11:08 am COMPARISON: 09/02/2018 HISTORY: ORDERING SYSTEM PROVIDED HISTORY: trauma TECHNOLOGIST PROVIDED HISTORY: Reason for exam:->trauma Reason for Exam: fall,pain Acuity: Acute Type of Exam: Initial Mechanism of Injury: fel today in closet,(er fall protocol);c/o lower back pain FINDINGS: There is no evidence of acute fracture. There is normal alignment. No acute joint abnormality. No focal osseous lesion. No focal soft tissue abnormality.   Previous right hip arthroplasty in previous exam:->pain, fall Reason for Exam: pain, fall Acuity: Acute Type of Exam: Initial FINDINGS: There is no evidence of acute fracture or dislocation. The bones are demineralized. There is mild anterolisthesis of L4 on L5. There is mild multilevel degenerative disc disease. Vacuum disc phenomenon is noted at multiple levels. There is mild multilevel facet degenerative change. There is no spondylolysis or spondylolisthesis. There is mild dextrocurvature of the lumbar spine centered at L1-L2. The sacroiliac joints are intact. There are old bilateral posterior 12th rib fractures. Atherosclerosis is noted. There is nonobstructing left renal calculus. Motion degrades the quality of the exam.  Diverticulosis is present. The prevertebral soft tissues are unremarkable. Right hip hardware is partially imaged. Osteopenia. No evidence of acute fracture or dislocation. Mild multilevel degenerative change. Mild dextrocurvature. Vl Dup Carotid Bilateral    Result Date: 1/4/2020  EXAMINATION: ULTRASOUND EVALUATION OF THE CAROTID ARTERIES 1/4/2020 5:16 pm COMPARISON: Cervical spine CT 01/04/2020 HISTORY: ORDERING SYSTEM PROVIDED HISTORY: syncope TECHNOLOGIST PROVIDED HISTORY: Reason for exam:->syncope Reason for Exam: syncope Acuity: Unknown Type of Exam: Initial FINDINGS: Patent common, internal, and external carotid arteries and vertebral arteries with antegrade flow. Minimal atherosclerotic plaque in the carotid bulbs. Peak systolic velocities as below: RIGHT Common carotid:  77 cm/s Carotid bulb:  66 cm/s Internal carotid (proximal):  40 cm/s Internal carotid (mid):  59 cm/s Internal carotid (distal):  98 cm/s External carotid:  59 cm/s Vertebral:  28 cm/s ICA/CCA ratio:  1.3 LEFT Common carotid:  70 cm/s Carotid bulb:  47 cm/s Internal carotid (proximal):  32 cm/s Internal carotid (mid):  55 cm/s Internal carotid (distal):  57 cm/s External carotid:  54 cm/s Vertebral:  37 cm/s ICA/CCA ratio:  0.8     1.  No
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
## 2020-02-03 NOTE — PROGRESS NOTES
Occupational Therapy      [facility]ly [facility] ACUTE CARE OCCUPATIONAL THERAPY EVALUATION    [facility], 5/11/1931, 3012/3012-A, 2/3/2020    Discharge Recommendation: Paco Tobias      History:  [place]:  The encounter diagnosis was Fall, initial encounter. Subjective:  Patient states: \"I was on the floor, I don't remember what happened. \"  Pain: Pt reported pain in Rt shoulder but did not quantify  Communication with other providers: PT Bart Price  Restrictions: General Precautions, Fall Risk    Home Setup/Prior level of function:  Social/Functional History  Lives With: Alone  Type of Home: (condo)  Home Layout: One level  Home Access: Level entry  Bathroom Shower/Tub: Tub/Shower unit  Bathroom Toilet: Handicap height  Bathroom Equipment: Grab bars in shower, Shower chair  Home Equipment: Rolling walker, 4 wheeled walker  ADL Assistance: [address]: Mod I with RW  Active : No  Additional Comments: stops by daily--groceries, laundry; approx 5 falls in past 1 year   Information above is from prior admission on 1-6-20, Pt d/c to SNF after admission and was home for <1week before she fell this admission. Examination:  · Observation: Supine in bed upon arrival. Pleasant and agreeable to evaluation, Pt's daughter present. · Vision: WFL (Glasses)   · [facility]: [facility]  · Vitals: Stable vitals throughout session    Body Systems and functions:  · ROM: 0-80' Rt shoulder flexion, WFL Rt UE distally.  WFL Lt UE    · Strength: 4-/5 BL UEs all major muscle groups   · Sensation: WFL  · Tone: Normal  · Coordination: WFL  · Perception: WNL    Activities of Daily Living (ADLs):  · Feeding: Independent   · Grooming: CGA (Pt performed oral hygiene of brushing and rinsing standing at sink, with brief CGA for balance)   · UB bathing: CGA (Seated, Anticipated for light dynamic sitting balance)   · LB bathing: Min A (Anticipated for dynamic standing balance to reach bottom)   · UB dressing: Min A (Anticipated to thread Rt UE due to pain and limited ROM)   · LB dressing: Max A (Donning BL socks, Anticipated for dynamic standing balance to manage pants over hips)   · Toileting: Max A (Anticipate for pant management both directions)     Cognitive and Psychosocial Functioning:  · Overall cognitive status: WFL   · Affect: Normal     Balance:   · Sitting: SBA sitting unsupported at EOB  · Standing: CGA with RW     Functional Mobility:  · Bed Mobility: Min A supine to sit with HOB elevated to 20' to support Rt shoulder/trunk and min verbal cues for hand placement   · Transfers: CGA sit to stand from bed, cues for hand placement     · Ambulation: CGA 10 ft with RW to and from bathroom       AM-PAC 6 click short form for inpatient daily activity:   How much help from another person does the patient currently need. .. Unable  Dep A Lot  Max A A Lot   Mod A A Little  Min A A Little   CGA  SBA None   Mod I  Indep  Sup   1. Putting on and taking off regular lower body clothing? [] 1    [x] 2   [] 2   [] 3   [] 3   [] 4      2. Bathing (including washing, rinsing, drying)? [] 1   [] 2   [] 2 [x] 3 [] 3 [] 4   3. Toileting, which includes using toilet, bedpan, or urinal? [] 1    [x] 2   [] 2   [] 3   [] 3   [] 4     4. Putting on and taking off regular upper body clothing? [] 1   [] 2   [] 2   [x] 3   [] 3    [] 4      5. Taking care of personal grooming such as brushing teeth? [] 1   [] 2    [] 2 [] 3    [x] 3   [] 4      6. Eating meals? [] 1   [] 2   [] 2   [] 3   [] 3   [x] 4      Raw Score:  17    [24=0% impaired(CH), 23=1-19%(CI), 20-22=20-39%(CJ), 15-19=40-59%(CK), 10-14=60-79%(CL), 7-9=80-99%(CM), 6=100%(CN)]     Treatment:  Pt performed functional transfer from bed with cues for safe hand placement. Pt performed oral hygiene task of brushing and rinsing standing at sink. Pt performed functional mobility of a HH distance.  Pt educated on role of OT, POC, and importance of OOB activity. Safety Measures: Gait belt used, Left in chair, Alarm in place, Needs in reach     Assessment:  Pt is a 80year old female with a past medical history of Acid reflux, Anesthesia, Arthritis, Diverticulosis, HTN (hypertension), Hx of blood clots, Kidney stones, Lower back pain, Neuropathy, Osteoporosis, Purpura (HCC), Shingles, Skin Cancer Excised Back Of Right Leg, Teeth missing, Urinary incontinence, Wears dentures, and Wears glasses. Pt admitted after a fall at home, charcot Lt foot, and KARAN. Pt was d/c to [facility] after last admission 1-6-20, Pt was d/c from [place] on 1- back to her 1-story condo where she lives alone. Prior to admission Pt was mostly independent in ADLs and functional mobility with a 4ww. Pt presents with the above impairments and would benefit from continued OT services in a SNF. Complexity: Moderate  Prognosis: Good  Plan: 3x/week      Goals:  1. Pt will complete all aspects of bed mobility for EOB/OOB ADLs Mod I with use of bed rails PRN  2. Pt will complete UB/LB bathing CGA seated   3. Pt will complete all aspects of LB dressing Min A with use of AE PRN  4. Pt will complete all functional transfers to and from bed, chair, toilet, shower chair SBA   5. Pt will ambulate HH distance to bathroom for toileting SBA with RW  6. Pt will complete all aspects of toileting task SBA with use of grab bars to steady PRN  7. Pt will complete oral hygiene/grooming routine in standing at sink SBA  8.  Pt will complete ther ex/ther act with focus on functional activity tolerance and safety awareness       Time:   Time in: 902  Time out: 919  Timed treatment minutes: 8  Total time: 17      Electronically signed by:  Emilia Patel, S/OT    FRITZ Tamayo/L, [place], [id number]

## 2020-02-03 NOTE — CONSULTS
[facility]  Consult Note       Daniela Box  AGE: 80 y.o.    GENDER: female  : 1931  TODAY'S DATE:  2/3/2020    Subjective:     Reason for  Evaluation and Assessment: wound assessment      Bruna Mccurdy is a 80 y.o. female referred by:   [x] Physician  [] Nursing  [] Other:     Wound Identification:  Wound Type: pressure from [place] malformation  Contributing Factors: decreased mobility        PAST MEDICAL HISTORY        Diagnosis Date    Acid reflux     Anesthesia     \"Combative After Anesthesia\"    Arthritis     \"All Over My Body\"    Diverticulosis     HTN (hypertension)     Hx of blood clots     \"Behind Right Knee\"    Kidney stones     Passed Kidney Stones In     Lower back pain     \"Sometimes\"    Neuropathy     bilateral feet    Osteoporosis     IV Reclast Once A Year, Last Dose Received In     Purpura ([address]     \"Arms\"    Shingles     \"Twice, First Time  In  Left Breast Area, Second Time In  Right Lower Abdomen\"    Skin Cancer Excised Back Of Right Leg     Teeth missing     Upper And Lower    Urinary incontinence     Wears dentures     Full Upper    Wears glasses        PAST SURGICAL HISTORY    Past Surgical History:   Procedure Laterality Date    APPENDECTOMY      BLADDER SUSPENSION      BREAST CYST EXCISION Right     Benign    COLONOSCOPY  Last Done In     Polpys Removed In Past    DENTAL SURGERY      Teeth Extracted In Past    DILATION AND CURETTAGE OF UTERUS      \"2 Or 3 \" Prior To DUANE In     FRACTURE SURGERY Left 12    Broken Left Femur With Hardware    HYSTERECTOMY, TOTAL ABDOMINAL      JOINT REPLACEMENT Right 14    Total Right Knee    OTHER SURGICAL HISTORY  11/23/15    Robotic Assisted Laparoscopic Sacrocolpopexy    OTHER SURGICAL HISTORY Right 2018    orif right patella    OVARIAN CYST SURGERY Right     \"Right\"    SKIN CANCER EXCISION      Skin Cancer at home, subsequent encounter    Fall (on)(from) incline, initial encounter       Measurements:  Wound 05/21/13 Other (Comment) Toe (Comment  which one) Right skin removed by Dr. Adan Ross on right 2nd toe (Active)   Number of days: 2449       Incision 05/23/13 Toe (Comment  which one) Right (Active)   Number of days: 2447       Incision 08/21/16 Hip (Active)   Number of days: 1261       Incision 09/04/18 Knee Right (Active)   Number of days: 516       Wound 02/03/20 Foot Left;Plantar (Active)   Wound Other 2/3/2020  2:00 PM   Dressing Status Changed 2/3/2020  2:00 PM   Dressing Changed Changed/New 2/3/2020  2:00 PM   Wound Cleansed Rinsed/Irrigated with saline 2/3/2020  2:00 PM   Wound Length (cm) 0.4 cm 2/3/2020  2:00 PM   Wound Width (cm) 0.5 cm 2/3/2020  2:00 PM   Wound Depth (cm) 1.6 cm 2/3/2020  2:00 PM   Wound Surface Area (cm^2) 0.2 cm^2 2/3/2020  2:00 PM   Wound Volume (cm^3) 0.32 cm^3 2/3/2020  2:00 PM   Distance Tunneling (cm) 0 cm 2/3/2020  2:00 PM   Tunneling Position ___ O'Clock 0 2/3/2020  2:00 PM   Undermining Starts ___ O'Clock 10 2/3/2020  2:00 PM   Undermining Ends___ O'Clock 3 2/3/2020  2:00 PM   Undermining Maxium Distance (cm) .5 2/3/2020  2:00 PM   Wound Assessment Pink 2/3/2020  2:00 PM   Drainage Amount Moderate 2/3/2020  2:00 PM   Drainage Description Serosanguinous 2/3/2020  2:00 PM   Odor None 2/3/2020  2:00 PM   Margins Defined edges 2/3/2020  2:00 PM   Emily-wound Assessment Dry;Calloused 2/3/2020  2:00 PM   Non-staged Wound Description Full thickness 2/3/2020  2:00 PM   [place]%Wound Bed 100 2/3/2020  2:00 PM   Number of days: 0       Response to treatment:  Well tolerated by patient. Pain Assessment:  Severity:  none  Quality of pain: na  Wound Pain Timing/Severity: na  Premedicated: no    Plan:     Plan of Care: Wound 02/03/20 Foot Left;Plantar-Dressing/Treatment: (packed with Aquacel AG, abd, kerlix)    Pt in bed. Agreeable to wound assessment. 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

## 2020-02-03 NOTE — PROGRESS NOTES
Called report to [facility]. Transport here, Waiting for [employer] from hospitalist. None printed and pt needs printed prescriptions on any controlled substances.

## 2020-02-03 NOTE — DISCHARGE INSTR - COC
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 [place] [place], Y92.009    Left skew foot deformity M21.6X2    Postural dizziness with near syncope R42, R55    Fall at home, sequela W19. XXXS, Y92.009    Acute kidney injury ([facility] [address] N17.9    Frequent falls R29.6    Class 1 obesity due to excess calories with body mass index (BMI) of 33.0 to 33.9 in adult E66.09, Z68.33    Fall at home, subsequent encounter W19. XXXD, Y92.009       Isolation/Infection:   Isolation          No Isolation        Patient Infection Status     None to display          Nurse Assessment:  Last Vital Signs: /62   Pulse 61   Temp 97.8 °F (36.6 °C) (Oral)   Resp 12   Ht 5' 5\" (1.651 m)   Wt 203 lb 1.6 oz (92.1 kg)   SpO2 92%   BMI 33.80 kg/m²     Last documented pain score (0-10 scale): Pain Level: 0  Last Weight:   Wt Readings from Last 1 Encounters:   02/03/20 203 lb 1.6 oz (92.1 kg)     Mental Status:  oriented and alert    IV Access:  - None    Nursing Mobility/ADLs:  Walking   Assisted  Transfer  Assisted  Bathing  Assisted  Dressing  Assisted  Toileting  Assisted  Feeding  Independent  Med Admin  Independent  Med Delivery   whole    Wound Care Documentation and Therapy:  Pressure Ulcer 08/20/16 Foot Left;Plantar wound bed is beefy red in the center with yellow slough at the edges with rednees going up the leg  (Active) Emily-Wound Texture Excoriation 1/4/2020  9:32 PM   Emily-Wound Color Erythema;Pale;Red; Other 1/4/2020  9:32 PM   Wound Assessment Edema;Red;Pale;Pink;Slough; Yellow 1/5/2020  8:41 AM   Closure Open to air 1/5/2020  8:41 AM   Dressing/Treatment Open to air 1/5/2020  8:41 AM   Wound Cleansed Rinsed/Irrigated with saline 1/4/2020  9:32 PM   Granulation Quality Red 1/4/2020  9:32 PM   Odor None 1/5/2020  8:41 AM   Number of days: 5657       Wound 05/21/13 Other (Comment) Toe (Comment  which one) Right skin removed by Dr. Michael Olson on right 2nd toe (Active)   Number of days: 2448       Incision 05/23/13 Toe (Comment  which one) Right (Active)   Number of days: 2447       Incision 08/21/16 Hip (Active)   Number of days: 1260       Incision 09/04/18 Knee Right (Active)   Number of days: 516        Elimination:  Continence:   · Bowel: Yes  · Bladder: Yes  Urinary Catheter: None   Colostomy/Ileostomy/Ileal Conduit: No       Date of Last BM: 02/3/2020  No intake or output data in the 24 hours ending 02/03/20 0758  No intake/output data recorded. Safety Concerns:      At Risk for Falls    Impairments/Disabilities:      None    Patient's personal belongings (please select all that are sent with patient):  None    RN SIGNATURE:  Electronically signed by Bk Santo RN on 2/3/20 at 5:26 PM                PHYSICIAN SECTION    Nutrition Therapy:  Current Nutrition Therapy:   [address] Diet List:[id number]}    Routes of Feeding: {CHP DME Other Feedings:052300309}  Liquids: {Slp liquid thickness:19189}  Daily Fluid Restriction: {CHP DME Yes amt example:903592724}  Last Modified Barium Swallow with Video (Video Swallowing Test): {Done Not Done ZZPT:529716545}    Treatments at the Time of Hospital Discharge:   Respiratory Treatments: ***  Oxygen Therapy:  {Therapy; copd oxygen:57890}  Ventilator:    { GERA Vent NQKX:630908025}    Rehab Therapies: {THERAPEUTIC INTERVENTION:9328925453}  Weight Bearing Status/Restrictions: { GERA Weight Bearin}  Other Medical Equipment (for information only, NOT a DME order):  {EQUIPMENT:666293581}  Other Treatments: ***    Prognosis: Fair    Condition at Discharge: Stable    Rehab Potential (if transferring to Rehab): Fair    Recommended Labs or Other Treatments After Discharge:  Continue keflex for cellulitis according to the outpatient schedule for the complete treatment duration (pt was on keflex, resume treatment for the recommended treatment period - follow up with pcp within 24 hrs after discharge)    Physician Certification: I certify the above information and transfer of Jennifer Coburn  is necessary for the continuing treatment of the diagnosis listed and that she requires [facility] for greater 30 days.      Update Admission H&P: No change in H&P    PHYSICIAN SIGNATURE:  Electronically signed by Saurav Ramos MD on 2/3/20 at 4:03 PM

## 2020-02-03 NOTE — H&P
History and Physical  CHUCK Rolon-BC   Internal Medicine Hospitalist      Name:  Albertina Shea /Age/Sex: 1931  (80 y.o. female)   MRN & CSN:  [id number] & [id number] Admission Date/Time: 2020  5:03 PM   Location:  ED20/ED-20 PCP: Angel Brownlee MD       Hospital Day: 1      Supervising Physician: Dr. Mateo Polanco      Chief Complaint: Fall (Unknown amout of time last seen 1000 this morning. Pt doesn't remeber falling. Unknown LOC or hitting head. Takes Coumadin.)     Assessment and Plan:   Albertina Shea is a 80 y.o.  female who presents with Acute kidney injury ([address]     Fall at home, subsequent encounter, could be 2/2 left charcot foot + obesity causing frequent falls - patient lives alone, ambulates with walker, CTs & XR shows no acute changes/process.  Charcot Left Foot - stable, conservative management.  Class 1 obesity due to excess calories with BMI of 33.0 to 33.9 in adult - current BMI 33.5         - admit for observation         - case management consulted for nursing home placement         - PT/OT eval and treat         - fall precautions       Acute kidney injury, likely pre-renal, fluid depletion - denied h/o kidney disease, Crea 1.2 (BL 0.8). - continue gentle IVF for 12 hrs         - strict I/O monitoring         - hold Lasix         - avoid nephrotoxic agents         - monitor Crea level, CMP in AM         - consider nephro consult if there is no improvement         - pending protein/crea ratio, urine sodium and crea      H/o Hypertension - nonadherent to treatment. - hold Lasix d/t KARAN         - c/w Norvasc, atenolol         - monitor BP trends       H/o DVT/Right knee blood clots - on Coumadin, may need to be DC d/t frequent falling, PT/INR in AM.     GERD - on PPI. Current diagnosis and plan of management discussed with the patient at the time of admission in lay language who agree to the above plan and disposition of admission for further care. All concerns and questions addressed. Patient assessment and plan in conjunction with supervising physician - Dr. Mell Tejada Renal    DVT Prophylaxis [] Lovenox, []  Heparin, [] SCDs, [] Ambulation  [] Long term [facility]  Patient is on Coumadin. GI Prophylaxis [x] PPI,  [] H2 Blocker,  [] Carafate,  [] Diet,  [] No GI prophylaxis, N/A: patient is not under significant medical stress, non-ICU or is receiving a diet/tube feeds   Code Status DNR-CCA   Disposition Patient requires continued admission due to Acute kidney injury ([facility] [address] Discharge Plan: Patient plans to discharge back to [address] [place] [address] after seen by case management team.   MDM [] Low, [x] Moderate,[]  High  Patient's risk as above due to:      [x] One or more chronic illnesses with mild exacerbation progression      [] Two or more stable chronic illnesses      [] Undiagnosed new problem with uncertain prognosis      [] Elective major surgery      []Prescription drug management     History of Present Illness:     Principal Problem: Acute kidney injury ([facility] [address]  Dilia Billings is a 80 y.o. morbidly obese female who presents to the ED from home after an incident of fall today. Patient lives alone, ambulates with walker, and has frequent falls in the past. Patient is presently awake, alert, and oriented x4. She is able to provide the events of fall.  believes that patient is not safe to live alone. Patient has past medical history of repeated falls, GERD, right knee blood clots-on Coumadin, Osteoporosis, arthritis, diverticulosis, and hypertension. She stated that she was discharged at [facility] after being discharged here last 01/06/2020, and stay at nursing home until the 26th of January. She went back home due to insurance issues that she cannot stay at nursing home. Today she fell again, and daughter found her crawling around on the floor.  She denies dizziness, lightheadedness, headache, loss of consciousness, hitting muscles strength. Full movements. No gross joint deformities. No swelling, intact sensation symmetrical.   SKIN  -Normal coloration, warm, dry. No open wounds or ulcers. Chronic bilateral redness. NEURO  -CN 2-12 appear grossly intact, normal speech, no lateralizing weakness. PSYC  -Awake, alert, oriented x 4. Appropriate affect. Past Medical History:      Past Medical History:   Diagnosis Date    Acid reflux     Anesthesia     \"Combative After Anesthesia\"    Arthritis     \"All Over My Body\"    Diverticulosis     HTN (hypertension)     Hx of blood clots 2000's    \"Behind Right Knee\"    Kidney stones     Passed Kidney Stones In 1970's    Lower back pain     \"Sometimes\"    Neuropathy     bilateral feet    Osteoporosis     IV Reclast Once A Year, Last Dose Received In 2014    Purpura ([address]     \"Arms\"    Shingles     \"Twice, First Time  In 1972 Left Breast Area, Second Time In 2000 Right Lower Abdomen\"    Skin Cancer Excised Back Of Right Leg 2000's    Teeth missing     Upper And Lower    Urinary incontinence     Wears dentures     Full Upper    Wears glasses      Past Surgery History:  Patient  has a past surgical history that includes bladder suspension (1990's); Toe amputation (Right, 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   Social History:    FAM HX: Assessed: family history includes Cancer in her brother; Diabetes in her brother, brother, brother, and son; Early Death (age of onset: 2) in her sister; Early Death (age of onset: 44) in her father; Early Death (age of onset: 46) in her brother; Heart Disease in her brother, mother, and sister; Kidney Disease in her brother and mother. Soc HX:   Social History     Socioeconomic History    Marital status:      Spouse name: None    Number of children: None    Years of education: None    Highest education level: None   Occupational History    None   Social Needs    Financial resource strain: None    Food insecurity:     Worry: None     Inability: None    Transportation needs:     Medical: None     Non-medical: None   Tobacco Use    Smoking status: Never Smoker    Smokeless tobacco: Never Used   Substance and Sexual Activity    Alcohol use: No    Drug use: No    Sexual activity: Never   Lifestyle    Physical activity:     Days per week: None     Minutes per session: None    Stress: None   Relationships    Social connections:     Talks on phone: None     Gets together: None     Attends Tenriism service: None     Active member of club or organization: None     Attends meetings of clubs or organizations: None     Relationship status: None    Intimate partner violence:     Fear of current or ex partner: None     Emotionally abused: None     Physically abused: None     Forced sexual activity: None   Other Topics Concern    None   Social History Narrative    None     TOBACCO:   reports that she has never smoked. She has never used smokeless tobacco.  ETOH:   reports no history of alcohol use. Drugs:  reports no history of drug use. Allergies: Allergies   Allergen Reactions    Percocet [Oxycodone-Acetaminophen]      \"I Get Got Crazy And Mean\"    Phenergan [Promethazine Hcl]      \"Violent Behavior\"      Tape [Adhesive Tape]      \"Redness And Tears The Skin , Paper Tape Is Ok To Use\"     Medications:   Medications:    Infusions:   PRN Meds:   Prior to Admission Meds:  Prior to Admission medications    Medication Sig Start Date End Date Taking?  Authorizing of Injury: fell on file cabinet this morning,landed on right arm,skin tear on PA elbow Relevant Medical/Surgical History: pt on blood thinners; ORDERING SYSTEM PROVIDED HISTORY: fall, arm pain TECHNOLOGIST PROVIDED HISTORY: Reason for exam:->fall, arm pain Reason for Exam: fall this morning, arm pain Acuity: Acute Type of Exam: Initial Mechanism of Injury: skin tear on PA elbow,fell and laned on arm up against file cabinet Relevant Medical/Surgical History: pt on blood thinners FINDINGS: Right shoulder: The bones are demineralized. There are mature ossifications in the region of the rotator cuff tendons. There is AC degenerative change with a dominant projecting distal clavicular osteophyte. There are small marginal glenohumeral osteophytes. The bones are demineralized. The visualized right lung is clear. Right humerus: The bones are demineralized. A fracture is not identified. Bony demineralization. A fracture is not identified. Xr Humerus Right (min 2 Views)    Result Date: 1/6/2020  EXAMINATION: THREE XRAY VIEWS OF THE RIGHT SHOULDER; TWO XRAY VIEWS OF THE RIGHT HUMERUS 1/4/2020 8:35 am COMPARISON: None. HISTORY: ORDERING SYSTEM PROVIDED HISTORY: trauma TECHNOLOGIST PROVIDED HISTORY: Right Shoulder - XR Portable Reason for exam:->trauma Reason for Exam: trauma,pain Acuity: Acute Type of Exam: Initial Mechanism of Injury: fell on file cabinet this morning,landed on right arm,skin tear on PA elbow Relevant Medical/Surgical History: pt on blood thinners; ORDERING SYSTEM PROVIDED HISTORY: fall, arm pain TECHNOLOGIST PROVIDED HISTORY: Reason for exam:->fall, arm pain Reason for Exam: fall this morning, arm pain Acuity: Acute Type of Exam: Initial Mechanism of Injury: skin tear on PA elbow,fell and laned on arm up against file cabinet Relevant Medical/Surgical History: pt on blood thinners FINDINGS: Right shoulder: The bones are demineralized.   There are mature ossifications in the region of the rotator Initial Additional signs and symptoms: ulcer? on plantar foot FINDINGS: Redemonstration to Lisfranc fracture dislocations with progressive healing of the multiple metatarsal fractures. The alignment of the dislocations are stable. No new acute fracture or dislocation. There is collapse of the midfoot. No radiographic findings of osteomyelitis. Lisfranc fracture dislocations with progressive healing of the multiple fracture deformities. No radiographic findings of osteomyelitis. Ct Head Wo Contrast    Result Date: 2/2/2020  EXAMINATION: CT OF THE HEAD WITHOUT CONTRAST; CT OF THE CERVICAL SPINE WITHOUT CONTRAST 2/2/2020 6:25 pm TECHNIQUE: CT of the head was performed without the administration of intravenous contrast. Dose modulation, iterative reconstruction, and/or weight based adjustment of the mA/kV was utilized to reduce the radiation dose to as low as reasonably achievable.; CT of the cervical spine was performed without the administration of intravenous contrast. Multiplanar reformatted images are provided for review. Dose modulation, iterative reconstruction, and/or weight based adjustment of the mA/kV was utilized to reduce the radiation dose to as low as reasonably achievable. COMPARISON: January 4 HISTORY: ORDERING SYSTEM PROVIDED HISTORY: fall TECHNOLOGIST PROVIDED HISTORY: Reason for exam:->fall Has a \"code stroke\" or \"stroke alert\" been called? ->No Reason for Exam: fell Acuity: Acute Type of Exam: Initial; ORDERING SYSTEM PROVIDED HISTORY: fall TECHNOLOGIST PROVIDED HISTORY: Reason for exam:->fall Reason for Exam: fell Acuity: Acute Type of Exam: Initial FINDINGS: BRAIN/VENTRICLES: Ventricles and sulci are stable. Multiple images are limited due to artifact. Multiple vascular calcifications are noted. Low-density foci are noted in the anterior limbs of the internal capsules bilaterally as well as in the periventricular white matter bilaterally.  Faint low-density is again noted in the trauma TECHNOLOGIST PROVIDED HISTORY: Reason for exam:->trauma Reason for Exam: fall with upper neck pain Acuity: Acute FINDINGS: BRAIN/VENTRICLES: There is no CT evidence of acute intracranial hemorrhage, mass or mass effect. There is low attenuation within the periventricular and subcortical white matter. Age related atrophy is identified. There is mild enlargement of the 3rd and lateral ventricles. Calcifications are noted in the basal ganglia. The 4th ventricle is in the midline. The basilar cisterns are preserved. ORBITS: The patient is status post cataract surgery. SINUSES: The visualized paranasal sinuses and mastoid air cells demonstrate no acute abnormality. SOFT TISSUES/SKULL:  No acute abnormality of the visualized skull or soft tissues. No acute intracranial abnormality. Nonspecific white matter disease, likely due to chronic small vessel ischemia. Atrophy. Ct Cervical Spine Wo Contrast    Result Date: 2/2/2020  EXAMINATION: CT OF THE HEAD WITHOUT CONTRAST; CT OF THE CERVICAL SPINE WITHOUT CONTRAST 2/2/2020 6:25 pm TECHNIQUE: CT of the head was performed without the administration of intravenous contrast. Dose modulation, iterative reconstruction, and/or weight based adjustment of the mA/kV was utilized to reduce the radiation dose to as low as reasonably achievable.; CT of the cervical spine was performed without the administration of intravenous contrast. Multiplanar reformatted images are provided for review. Dose modulation, iterative reconstruction, and/or weight based adjustment of the mA/kV was utilized to reduce the radiation dose to as low as reasonably achievable. COMPARISON: January 4 HISTORY: ORDERING SYSTEM PROVIDED HISTORY: fall TECHNOLOGIST PROVIDED HISTORY: Reason for exam:->fall Has a \"code stroke\" or \"stroke alert\" been called? ->No Reason for Exam: fell Acuity: Acute Type of Exam: Initial; ORDERING SYSTEM PROVIDED HISTORY: fall TECHNOLOGIST PROVIDED HISTORY: Reason for 9:14 am TECHNIQUE: CT of the cervical spine was performed without the administration of intravenous contrast. Multiplanar reformatted images are provided for review. Dose modulation, iterative reconstruction, and/or weight based adjustment of the mA/kV was utilized to reduce the radiation dose to as low as reasonably achievable. COMPARISON: 09/02/2018 HISTORY: ORDERING SYSTEM PROVIDED HISTORY: trauma TECHNOLOGIST PROVIDED HISTORY: Reason for exam:->trauma Reason for Exam: fall with upper neck pain Acuity: Acute FINDINGS: BONES/ALIGNMENT: There is no acute fracture or traumatic malalignment. Mild anterolisthesis C7 on T1 appears degenerative in etiology. DEGENERATIVE CHANGES: Multilevel degenerative changes. SOFT TISSUES: There is no prevertebral soft tissue swelling. No acute abnormality of the cervical spine. Ct Lumbar Spine Wo Contrast    Result Date: 1/6/2020  EXAMINATION: CT OF THE LUMBAR SPINE WITHOUT CONTRAST  1/4/2020 TECHNIQUE: CT of the lumbar spine was performed without the administration of intravenous contrast. Multiplanar reformatted images are provided for review. Dose modulation, iterative reconstruction, and/or weight based adjustment of the mA/kV was utilized to reduce the radiation dose to as low as reasonably achievable. COMPARISON: None HISTORY: ORDERING SYSTEM PROVIDED HISTORY: pain, fall TECHNOLOGIST PROVIDED HISTORY: Reason for exam:->pain, fall Reason for Exam: pain, fall Acuity: Acute Type of Exam: Initial FINDINGS: There is no evidence of acute fracture or dislocation. The bones are demineralized. There is mild anterolisthesis of L4 on L5. There is mild multilevel degenerative disc disease. Vacuum disc phenomenon is noted at multiple levels. There is mild multilevel facet degenerative change. There is no spondylolysis or spondylolisthesis. There is mild dextrocurvature of the lumbar spine centered at L1-L2. The sacroiliac joints are intact.   There are old bilateral

## 2020-02-03 NOTE — CONSULTS
[address], 5/11/1931, 3012/3012-A, 2/3/2020    Discharge Recommendation: SNF    Equipment: defer    History  [place]:  The encounter diagnosis was Fall, initial encounter. Subjective:  Patient states:  Agreeable to PT evaluation. alert and oriented. Does not remember why she fell. Dtr at bedside is emotional, says pt has not been the same cognitively Says \"this is not my mother\"     Pain: R shoulder pain, has been sore since fall in january   Communication with other providers: OT   Restrictions: fall risk     Home Setup/Prior level of function    Social/Functional History  Lives With: Alone  Type of Home: (condo)  Home Layout: One level  Home Access: Level entry  Bathroom Shower/Tub: Tub/Shower unit  Bathroom Toilet: Handicap height  Bathroom Equipment: Grab bars in shower, Shower chair  Home Equipment: Rolling walker, 4 wheeled walker  ADL Assistance: Independent  Homemaking Assistance: Independent  Active : No  Additional Comments: stops by daily--groceries, laundry; approx 5 falls in past 1 year   Was not receiving [address] after SNF dc. dtr says she was coming over daily    Examination of body systems (includes body structures/functions, activity/participation limitations):  · Observation:  Supine in bed upon arrival, seeping from bilat LE's, charcot deformities in foot  · Vision:  [facility]  · Hearing:  [facility]  · Cardiopulmonary: [facility]  · Cognition: alert and oriented, reduced insight. dtr believes still not at baseline mentation, see OT/SLP note for further evaluation. Body Structures/Function  · ROM R/L:  WFL. · Strength R/L:  3+/5, weakness observed in function. · Neuro:  Charcot foot, decreased light touch/proprioception      Mobility:  · Rolling L/R:  CGA  · Supine to sit:  CGA- increased time and effort  · Transfers: CGA- increased time and effort. Hand placement cues. · Sitting balance:  SBA.     · Standing balance:  CGA at RW -

## 2020-02-03 NOTE — DISCHARGE SUMMARY
Stable    Discharge Medications:      [facility]   Home Medication Instructions FCA:[id number]    Printed on:02/03/20 6952   Medication Information                      acetaminophen (TYLENOL) 500 MG tablet  Take 2 tablets by mouth every 6 hours as needed for Pain or Fever             amLODIPine (NORVASC) 10 MG tablet  Take 1 tablet by mouth daily             atenolol (TENORMIN) 25 MG tablet  Take 25 mg by mouth every morning              cephALEXin (KEFLEX) 250 MG capsule  Take 1 capsule by mouth 4 times daily for 7 days             docusate sodium (COLACE, DULCOLAX) 100 MG CAPS  Take 100 mg by mouth 2 times daily             lidocaine 4 % external patch  Place 1 patch onto the skin daily             pantoprazole (PROTONIX) 40 MG tablet  Take 1 tablet by mouth every morning             pregabalin (LYRICA) 150 MG capsule  Take 1 capsule by mouth 2 times daily. [place] warfarin (COUMADIN) 5 MG tablet  Take 5 mg by mouth daily                 Objective Findings at Discharge:   BP (!) 164/56   Pulse 60   Temp 98 °F (36.7 °C) (Oral)   Resp 16   Ht 5' 5\" (1.651 m)   Wt 203 lb 1.6 oz (92.1 kg)   SpO2 92%   BMI 33.80 kg/m²            PHYSICAL EXAM   GEN Awake female, sitting upright in bed in no apparent distress. Appears given age. EYES Pupils are equally round. No scleral erythema, discharge, or conjunctivitis. HENT Mucous membranes are moist. Oral pharynx without exudates, no evidence of thrush. NECK Supple, no apparent thyromegaly or masses. RESP Clear to auscultation, no wheezes, rales or rhonchi. Symmetric chest movement while on room air. CARDIO/VASC S1/S2 auscultated. Regular rate without appreciable murmurs, rubs, or gallops. No JVD or carotid bruits. Peripheral pulses equal bilaterally and palpable. No peripheral edema. GI Abdomen is soft without significant tenderness, masses, or guarding. Bowel sounds are normoactive. Rectal exam deferred.  No costovertebral angle tenderness.  Normal appearing external genitalia. Aguilera catheter is not present. HEME/LYMPH No palpable cervical lymphadenopathy and no hepatosplenomegaly. No petechiae or ecchymoses. MSK No gross joint deformities. SKIN Normal coloration, warm, dry. NEURO Cranial nerves appear grossly intact, normal speech, no lateralizing weakness. PSYCH Awake, alert, oriented x 4. Affect appropriate.     BMP/CBC  Recent Labs     02/02/20  1725 02/03/20  0945   * 138   K 4.9 4.9   CL 96* 100   CO2 25 27   BUN 17 15   CREATININE 1.2* 1.0   WBC 10.6* 9.1   HCT 36.9* 40.1    284           Discharge Time of 35 minutes    Electronically signed by Rosie Blancas MD on 2/3/2020 at 4:04 PM

## 2020-05-27 PROBLEM — L03.90 CELLULITIS: Status: ACTIVE | Noted: 2020-01-01

## 2020-05-27 NOTE — ED PROVIDER NOTES
EKG  Normal sinus rhythm with rate of 70 bpm, normal intervals. No ST elevation.   No previous to compare     Rex Nix MD  05/27/20 4414

## 2020-05-27 NOTE — ED PROVIDER NOTES
Emergency [address] [place] [address] EMERGENCY DEPARTMENT    Patient: Michael Nazario  MRN: [id number]  : 1931  Date of Evaluation: 2020  ED Provider: Markos Titus PA-C    Chief Complaint       Chief Complaint   Patient presents with   Ezio Georges is a 80 y.o. female who presents to the emergency department for frequent falls. Patient here from home. She lives independently. She was at [facility] until 4 days ago following an admission to our facility in February. She has Charcot foot and a chronic ulcer to the plantar aspect of the left foot. She has had swelling and redness/warmth to the bilateral legs for several months. Patient reports having an MRI recently and is scheduled to get the results this coming Friday. She tells me she has had at least 4 falls in the last 2-3 days. Denies any dizziness or syncope, states she \" just falls. \"  She does use a walker for assistance with ambulation. She does report hitting her head on multiple occasions. She is on Coumadin. She denies any neck or back pain. Denies chest pain or sob. Denies abd pain, n/v/d. She complains only of right upper arm pain. Patient's daughter, Shoaib Miranda, did call to supplement history. She is also concerned about patient's right knee--states she has had a replacement but it looks \"deformed\" since these falls. Daughter is also concerned that patient may not be taking her medications correctly. She reports she has antibiotics but doesn't think she is taking these at all. She does confirm patient has an appointment on Friday to go over results of MRI with Dr. Chela Rowley. ROS     CONSTITUTIONAL:  Denies fever. EYES:  Denies visual changes. HEAD:  Denies headache. ENT:  Denies earache, nasal congestion, sore throat. NECK:  Denies neck pain. RESPIRATORY:  Denies any shortness of breath. CARDIOVASCULAR:  Denies chest pain. GI:  Denies nausea or vomiting. :  Denies urinary symptoms. MUSCULOSKELETAL:  + right upper arm pain, right knee pain, bilateral LE edema. BACK:  Denies back pain. INTEGUMENT:  + left foot ulcer, cellulitis. NEUROLOGIC:  Denies any LOC.     Past History     Past Medical History:   Diagnosis Date    Acid reflux     Anesthesia     \"Combative After Anesthesia\"    Arthritis     \"All Over My Body\"    Diverticulosis     HTN (hypertension)     Hx of blood clots 2000's    \"Behind Right Knee\"    Kidney stones     Passed Kidney Stones In 1970's    Lower back pain     \"Sometimes\"    Neuropathy     bilateral feet    Osteoporosis     IV Reclast Once A Year, Last Dose Received In 2014    Purpura ([address]     \"Arms\"    Shingles     \"Twice, First Time  In 1972 Left Breast Area, Second Time In 2000 Right Lower Abdomen\"    Skin Cancer Excised Back Of Right Leg 2000's    Teeth missing     Upper And Lower    Urinary incontinence     Wears dentures     Full Upper    Wears glasses      Past Surgical History:   Procedure Laterality Date    APPENDECTOMY  1941    BLADDER SUSPENSION  1990's    BREAST CYST EXCISION Right 1955    Benign    COLONOSCOPY  Last Done In 2000's    Polpys Removed In Past    DENTAL SURGERY      Teeth Extracted In Past    DILATION AND CURETTAGE OF UTERUS      \"2 Or 3 \" Prior To DUANE In 1970's    FRACTURE SURGERY Left 7-7-12    Broken Left Femur With Hardware    HYSTERECTOMY, TOTAL ABDOMINAL  1970's    JOINT REPLACEMENT Right 12-2-14    Total Right Knee    OTHER SURGICAL HISTORY  11/23/15    Robotic Assisted Laparoscopic Sacrocolpopexy    OTHER SURGICAL HISTORY Right 09/04/2018    orif right patella    OVARIAN CYST SURGERY Right 1957    \"Right\"    SKIN CANCER EXCISION  2000's    Skin Cancer Excised Back Of Right Leg    TOE AMPUTATION Right 1990's Or 2000's    Right Foot Little Toe    TOE AMPUTATION Right 5-23-13    Second Toe    TONSILLECTOMY  1962    TOTAL HIP ARTHROPLASTY Right 08/21/2016     Social History Bilateral carotid artery calcification. Motion artifact is seen at the lung apices. Thyroid is bilaterally small. Multilevel degenerative disc disease. Unchanged trace anterolisthesis of C7 on T1. No gross fracture. Lateral curvature of the cervical spine which can be positional or secondary to spasm. Xr Chest Portable    Result Date: 5/27/2020  EXAMINATION: ONE XRAY VIEW OF THE CHEST; TWO XRAY VIEWS OF THE RIGHT HUMERUS 5/27/2020 7:03 pm COMPARISON: Chest dated 02/02/2020 HISTORY: ORDERING SYSTEM PROVIDED HISTORY: frequent falls TECHNOLOGIST PROVIDED HISTORY: Reason for exam:->frequent falls Reason for Exam: frequent falls Acuity: Acute Type of Exam: Initial Mechanism of Injury: frequent falls Relevant Medical/Surgical History: na; ORDERING SYSTEM PROVIDED HISTORY: frequent falls TECHNOLOGIST PROVIDED HISTORY: Reason for exam:->frequent falls FINDINGS: Chest: The heart size is upper normal.  There is calcification of the thoracic aorta. There is no pneumothorax, edema or focal consolidation. The bones are demineralized. Right humerus: A fracture is not identified. No focal soft tissue abnormality is appreciated. No evidence of acute cardiopulmonary disease. Osteopenia. A displaced right humeral fracture is not identified. Mri Foot Left W Wo Contrast    Result Date: 5/19/2020  EXAMINATION: MRI OF THE LEFT FOOT WITH AND WITHOUT CONTRAST, 5/19/2020 11:37 am TECHNIQUE: Multiplanar multisequence MRI of the left foot was performed with and without the administration of intravenous contrast. COMPARISON: Left foot radiographs 01/04/2020. HISTORY: ORDERING SYSTEM PROVIDED HISTORY: Osteomyelitis of left foot, unspecified type ([facility] [address] TECHNOLOGIST PROVIDED HISTORY: Reason for Exam: pt has ulcer bottom of foot( arch)-pt states x 6 mos - eval osteo gfr>60 today 20 ml prohance FINDINGS: LISFRANC JOINT: Significant midfoot disorganization with lateral 1st and 2nd tarsometatarsal dislocation.   Dorsal subluxation of the 2nd, 4th, and 5th metatarsal bases. Wide distraction between the middle and lateral cuneiform bones. The Lisfranc ligament is not clearly visualized and likely torn. BONE MARROW: Moderate marrow edema and decreased T1 signal in the medial cuneiform. Chronic displaced healed fracture of the 3rd metatarsal shaft. No acute fracture. No definite osseous erosion. JOINTS: Mild tibiotalar, subtalar, and calcaneocuboid degenerative changes. The metatarsophalangeal joints are intact. No joint effusion. SOFT TISSUES: Deep soft tissue ulceration plantar to the cuboid. This is contiguous with pockets of gas and fluid in the adjacent subcutaneous fat in total measuring approximately 5.8 x 1.7 x 5.7 cm. Extensive subcutaneous edema. Severe fatty atrophy and mild edema of the forefoot musculature. No abnormal soft tissue mass. TENDONS: The visualized tendons are grossly intact without tenosynovitis. 1. Deep soft tissue ulceration plantar to the cuboid contiguous with a loculated collection of pockets of gas and fluid in the adjacent subcutaneous fat measuring approximately 5.8 x 1.7 x 5.7 cm compatible with an abscess. 2. Midfoot disorganization and degenerative changes compatible with chronic neuropathic changes. Moderate marrow edema in the medial cuneiform. Given the lack of an adjacent soft tissue ulceration this may represent reactive osteitis with early osteomyelitis not excluded but less likely. 3. No significant marrow edema adjacent to the soft tissue ulceration which most closely approaches the cuboid. EKG:  Please see Dr. Agustina Oh' note for interpretation. ED Course and MDM   -  Patient seen and evaluated in the emergency department. -  Triage and nursing notes reviewed and incorporated. -  Old chart records reviewed and incorporated. -  Supervising physician was Dr. Agustina Oh. Patient was seen independently.   -  Work-up included:  See above  -  ED medications:  Vancomycin,

## 2020-05-27 NOTE — ED NOTES
Care and report to Ruth De Los Santos, Monserrata. Dre Rodarte [address], RN  05/27/20 Jignesh Moreno

## 2020-05-28 NOTE — PROGRESS NOTES
reach,  gait belt used. Assessment: Body structures, Functions, Activity limitations: Decreased functional mobility ; Decreased strength; Decreased ADL status; Decreased safe awareness; Decreased endurance; Decreased sensation; Decreased balance; Decreased posture  Pt is an 80year old female admitted with frequent falls. She has Charcot foot and a chronic ulcer to the plantar aspect of the left foot. Surgeon planning for biopsy 5/29. Recommend ARU once medically stable. At baseline, she is Gigi with gross mobility and ADLs. She is currently Priti with dec tolerance to activity. She would benefit from continued therapy to address her current deficits, dec potential fall risk, and restore function. Complexity: Moderate  Prognosis: Good, no significant barriers to participation at this time. Plan Times per week: 3+/week  Discharge Recommendations: IP Rehab  Equipment: continue to assess at next level of care     Goals:  Short term goals  Time Frame for Short term goals: 1 week  Short term goal 1: Pt will perform bed mobility Gigi   Short term goal 2: Pt will transfer to all surfaces SBA   Short term goal 3: Pt will ambulate 50ft with RW SBA   Short term goal 4: Pt will complete standing dynamic activity x 2 minutes SBA, single UE support. Treatment plan:  Strengthening; Balance Training; Functional Mobility Training; Transfer Training; ADL/Self-care Training; IADL Training; Endurance Training; Gait Training; Neuromuscular Re-education; Manual Therapy - Soft Tissue Mobilization; Home Exercise Program; Safety Education & Training; Patient/Caregiver Education & Training; Equipment Evaluation, Education, & procurement; Modalities;  Positioning    Recommendations for NURSING mobility: stand step pivot transfer with RW     Time:   Time in: 0834  Time out: 0900  Timed treatment minutes: 10  Total time: 26    Electronically signed by:    Muna Dnaiel [id number]  5/28/2020, 11:24 AM

## 2020-05-28 NOTE — H&P
History and Physical      Name:  Samuel Vang /Age/Sex: 1931  (80 y.o. female)   MRN & CSN:  [id number] & [id number] Admission Date/Time: 2020  5:42 PM   Location:  ED[facility] PCP: Lashonda Serrano MD       Hospital Day: 1    Assessment and Plan:   Samuel Vang is a 80 y.o.  female  who presents with fall    -Recurrent falls, imaging negative for acute abnormalities. Probably falls due to debility and generalized weakness  -Charcot foot  -Left plantar ulceration with subcutaneous abscess, active purulent drainage  -Anemia likely due to chronic infection  -Morbid obesity  -History of lower extremity DVT several years ago on warfarin INR subtherapeutic    Plan  IV vancomycin and IV cefepime  Follow blood cultures  Consult podiatry  N.p.o. after midnight  Hold warfarin    Diet No diet orders on file   DVT Prophylaxis [] Lovenox, []  Heparin, [] SCDs, [] Ambulation   GI Prophylaxis [] PPI,  [] H2 Blocker,  [] Carafate,  [] Diet/Tube Feeds   Code Status Prior   Disposition Patient requires continued admission due to    MDM [] Low, [] Moderate,[]  High  Patient's risk as above due to      History of Present Illness:     Chief Complaint: Yu Bradshaw is a 80 y.o.  female  who presents with fall. Patient presented to emergency room by EMS. She fell 3 times over the past 3 days. She slid off the chair as she was trying to stand up. She denies loss of consciousness. She did not hit her head. She denies any nausea, vomiting, chest pain, shortness breath, fever or chills. No change in bowel habits or urinary symptoms. Patient was in skilled nursing facility discharged home few days ago.        Ten point ROS reviewed negative, unless as noted above    Objective:   No intake or output data in the 24 hours ending 20 2316   Vitals:   Vitals:    20 2200   BP: (!) 145/61   Pulse: 65   Resp: 18   Temp:    SpO2: 98%     Physical Exam:   GEN Awake female, sitting upright in bed in no apparent organization: None     Attends meetings of clubs or organizations: None     Relationship status: None    Intimate partner violence     Fear of current or ex partner: None     Emotionally abused: None     Physically abused: None     Forced sexual activity: None   Other Topics Concern    None   Social History Narrative    None       Medications:   Medications:    Infusions:   PRN Meds:     Prior to Admission medications    Medication Sig Start Date End Date Taking? Authorizing Provider   pregabalin (LYRICA) 150 MG capsule Take 1 capsule by mouth 2 times daily.  2/3/20 10/29/22  Karlis Province, APRN - CNP   lidocaine 4 % external patch Place 1 patch onto the skin daily 1/7/20   Benjamin Sun MD   docusate sodium (COLACE, DULCOLAX) 100 MG CAPS Take 100 mg by mouth 2 times daily 9/6/18   Susanna Gilliam MD   amLODIPine (NORVASC) 10 MG tablet Take 1 tablet by mouth daily 9/7/18   Susanna Gilliam MD   warfarin (COUMADIN) 5 MG tablet Take 5 mg by mouth daily    Historical Provider, MD   acetaminophen (TYLENOL) 500 MG tablet Take 2 tablets by mouth every 6 hours as needed for Pain or Fever 8/24/16   Mathew Isabel MD   pantoprazole (PROTONIX) 40 MG tablet Take 1 tablet by mouth every morning 8/24/16   Mathew Isabel MD   atenolol (TENORMIN) 25 MG tablet Take 25 mg by mouth every morning     Historical Provider, MD       Electronically signed by Brianna Garcia MD on 5/27/2020 at 11:16 PM

## 2020-05-28 NOTE — ANESTHESIA PRE PROCEDURE
Department of Anesthesiology  Preprocedure Note       Name:  Laura Jain   Age:  80 y.o.  :  1931                                          MRN:  [id number]         Date:  2020      Surgeon: Hilda Mayo):  Yogi Lim MD    Procedure: Procedure(s):  FOOT DEBRIDEMENT INCISION AND DRAINAGE LEFT    Medications prior to admission:   Prior to Admission medications    Medication Sig Start Date End Date Taking? Authorizing Provider   pregabalin (LYRICA) 150 MG capsule Take 1 capsule by mouth 2 times daily.  2/3/20 10/29/22  CHUCK Neff - CNP   lidocaine 4 % external patch Place 1 patch onto the skin daily 20   Mark Bowers MD   docusate sodium (COLACE, DULCOLAX) 100 MG CAPS Take 100 mg by mouth 2 times daily 18   Sheridan Herrera MD   amLODIPine (NORVASC) 10 MG tablet Take 1 tablet by mouth daily 18   Sheridan Herrera MD   warfarin (COUMADIN) 5 MG tablet Take 5 mg by mouth daily    Historical Provider, MD   acetaminophen (TYLENOL) 500 MG tablet Take 2 tablets by mouth every 6 hours as needed for Pain or Fever 16   Jacques Goldberg, MD   pantoprazole (PROTONIX) 40 MG tablet Take 1 tablet by mouth every morning 16   Jacques Goldberg, MD   atenolol (TENORMIN) 25 MG tablet Take 25 mg by mouth every morning     Historical Provider, MD       Current medications:    Current Facility-Administered Medications   Medication Dose Route Frequency Provider Last Rate Last Dose    amLODIPine (NORVASC) tablet 5 mg  5 mg Oral Daily Ahsan White MD   5 mg at 20 1015    pantoprazole (PROTONIX) tablet 40 mg  40 mg Oral QAM Ahsan White MD   40 mg at 20 0537    pregabalin (LYRICA) capsule 150 mg  150 mg Oral BID Ahsan White MD   150 mg at 20 1015    sodium chloride flush 0.9 % injection 10 mL  10 mL Intravenous 2 times per day Ahsan White MD   10 mL at 20 1016    sodium chloride flush 0.9 % injection 10 mL  10 mL Intravenous PRN Ahsan White MD   10 mL at (hypertension)     Hx of blood clots 2000's    \"Behind Right Knee\"    Kidney stones     Passed Kidney Stones In 1970's    Lower back pain     \"Sometimes\"    Neuropathy     bilateral feet    Osteoporosis     IV Reclast Once A Year, Last Dose Received In 2014    Purpura ([address]     \"Arms\"    Shingles     \"Twice, First Time  In 1972 Left Breast Area, Second Time In 2000 Right Lower Abdomen\"    Skin Cancer Excised Back Of Right Leg 2000's    Teeth missing     Upper And Lower    Urinary incontinence     Wears dentures     Full Upper    Wears glasses        Past Surgical History:        Procedure Laterality Date    APPENDECTOMY  1941    BLADDER SUSPENSION  1990's    BREAST CYST EXCISION Right 1955    Benign    COLONOSCOPY  Last Done In 2000's    Polpys Removed In Past    DENTAL SURGERY      Teeth Extracted In Past    DILATION AND CURETTAGE OF UTERUS      \"2 Or 3 \" Prior To DUANE In 1970's    FRACTURE SURGERY Left 7-7-12    Broken Left Femur With Hardware    HYSTERECTOMY, TOTAL ABDOMINAL  1970's    JOINT REPLACEMENT Right 12-2-14    Total Right Knee    OTHER SURGICAL HISTORY  11/23/15    Robotic Assisted Laparoscopic Sacrocolpopexy    OTHER SURGICAL HISTORY Right 09/04/2018    orif right patella    OVARIAN CYST SURGERY Right 1957    \"Right\"    SKIN CANCER EXCISION  2000's    Skin Cancer Excised Back Of Right Leg    TOE AMPUTATION Right 1990's Or 2000's    Right Foot Little Toe    TOE AMPUTATION Right 5-23-13    Second Toe    TONSILLECTOMY  1962    TOTAL HIP ARTHROPLASTY Right 08/21/2016       Social History:    Social History     Tobacco Use    Smoking status: Never Smoker    Smokeless tobacco: Never Used   Substance Use Topics    Alcohol use:  No                                Counseling given: Not Answered      Vital Signs (Current):   Vitals:    05/28/20 0009 05/28/20 0530 05/28/20 1013 05/28/20 1344   BP: (!) 143/61 (!) 127/51 (!) 119/44 (!) 149/68   Pulse: 69 70 75 78   Resp: 18 18 16 17

## 2020-05-28 NOTE — PROGRESS NOTES
Occupational Therapy    [facility] ACUTE CARE OCCUPATIONAL THERAPY EVALUATION  Patsy Box, 5/11/1931, 1125/1125-A, 5/28/2020    History  [place]:  The primary encounter diagnosis was Left foot infection. Diagnoses of Ulcer of left foot, unspecified ulcer stage ([address] Frequent falls, and Urinary tract infection without hematuria, site unspecified were also pertinent to this visit. Patient  has a past medical history of Acid reflux, Anesthesia, Arthritis, Diverticulosis, HTN (hypertension), Hx of blood clots, Kidney stones, Lower back pain, Neuropathy, Osteoporosis, Purpura (HCC), Shingles, Skin Cancer Excised Back Of Right Leg, Teeth missing, Urinary incontinence, Wears dentures, and Wears glasses. Patient  has a past surgical history that includes bladder suspension (1990's); Toe amputation (Right, 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 Subjective:  Patient states:  \"I did everything but drive before\"    Pain:  No.    Communication with other providers:  Handoff to RN, co-eval with PT.    Restrictions: WBAT on LLE per Dr Andrew Forbes. General Precautions, Fall Risk    Home Setup/Prior level of function  Social/Functional History  Lives With: Alone  Type of Home: (condo)  Home Layout: One level  Home Access: Level entry  Bathroom Shower/Tub: Walk-in shower, Shower chair with back  Bathroom Toilet: Handicap height  Bathroom Accessibility: Accessible  Home Equipment: 4 wheeled walker, Rolling walker  ADL Assistance: Independent  Homemaking Assistance: Independent  Homemaking Responsibilities: Yes  Ambulation Assistance: Independent  Transfer Assistance: Independent  Active : No  Patient's  Info: friends  Occupation: Retired  Additional Comments: Pt reports 4 times last week. Pt is unsure of why    Examination of body systems (includes body structures/functions, activity/participation limitations):  · Observation:  Supine in bed upon arrival, agreeable to therapy  · Vision:  Glasses  · Hearing:  [employer] [place]  · Cardiopulmonary:  No 02 needs      Body Systems and functions:  · ROM R/L:  WFL. · Strength R/L:  4+/5,   · Sensation: WFL  · Tone: Normal  · Coordination: WFL  · Perception: WNL    Activities of Daily Living (ADLs):  · Feeding: Gigi  · Grooming: CGA (washing face w/ warm washcloth)  · UB bathing: Supervision  · LB bathing: maxA (washing virgie area/buttocks in stand due to decreased dynamic standing balance, pt soiled depends in bed)  · UB dressing: Supervision  · LB dressing: dependent/total (donning socks supine in bed, doffing soiled depends, donning fresh depends)  · Toileting: dependent/total (See LB bathing/dressing for details)    Cognitive and Psychosocial Functioning:  · Overall cognitive status: WNL  · Affect: Normal, Pleasant and motivated       Mobility:  · Supine to sit: SBA    · Transfers: Priti from EOB up to RW  · Sitting balance:  SBA.     · Standing balance:  CGA w/ RW.  · Functional Mobility: CGA w/ RW (See PT notes for gait details)  · Toilet/Shower Transfers: DNT             AM-PAC Daily Activity Inpatient   How much help for putting on and taking off regular lower body clothing?: Total  How much help for Bathing?: A Lot  How much help for Toileting?: Total  How much help for putting on and taking off regular upper body clothing?: None  How much help for taking care of personal grooming?: A Little  How much help for eating meals?: None  AM-PAC Inpatient Daily Activity Raw Score: 15  AM-PAC tolerance    Recommendations for NURSING activity: Up to chair for all 3 meals and up to [facility] for all toileting needs    Time:   Time in: 0829  Time out: 0859  Timed treatment minutes: 25 minutes  Total time: 30 minutes    Electronically signed by:    Kaila HU/L [id number]  9:36 AM,5/28/2020

## 2020-05-28 NOTE — CONSULTS
excision (2000's); Dental surgery; Appendectomy (1941); joint replacement (Right, 12-2-14); Tonsillectomy (1962); Hysterectomy, total abdominal (1970's); Dilation and curettage of uterus; Ovarian cyst surgery (Right, 1957); fracture surgery (Left, 7-7-12); Breast cyst excision (Right, 1955); other surgical history (11/23/15); Total hip arthroplasty (Right, 08/21/2016); and other surgical history (Right, 09/04/2018). Allergies: Allergies   Allergen Reactions    Percocet [Oxycodone-Acetaminophen]      \"I Get Got Crazy And Mean\"    Phenergan [Promethazine Hcl]      \"Violent Behavior\"      Tape [Adhesive Tape]      \"Redness And Tears The Skin , Paper Tape Is Ok To Use\"        Home Meds:    Prior to Admission medications    Medication Sig Start Date End Date Taking? Authorizing Provider   pregabalin (LYRICA) 150 MG capsule Take 1 capsule by mouth 2 times daily.  2/3/20 10/29/22  CHUCK Dukes - CNP   lidocaine 4 % external patch Place 1 patch onto the skin daily 1/7/20   Golden Guzman MD   docusate sodium (COLACE, DULCOLAX) 100 MG CAPS Take 100 mg by mouth 2 times daily 9/6/18   Madelyn White MD   amLODIPine (NORVASC) 10 MG tablet Take 1 tablet by mouth daily 9/7/18   Madelyn White MD   warfarin (COUMADIN) 5 MG tablet Take 5 mg by mouth daily    Historical Provider, MD   acetaminophen (TYLENOL) 500 MG tablet Take 2 tablets by mouth every 6 hours as needed for Pain or Fever 8/24/16   Luz Richardson MD   pantoprazole (PROTONIX) 40 MG tablet Take 1 tablet by mouth every morning 8/24/16   Luz Richardson MD   atenolol (TENORMIN) 25 MG tablet Take 25 mg by mouth every morning     Historical Provider, MD        Hospital Meds:    Current Facility-Administered Medications   Medication Dose Route Frequency Provider Last Rate Last Dose    amLODIPine (NORVASC) tablet 5 mg  5 mg Oral Daily Aliza Brito MD        pantoprazole (PROTONIX) tablet 40 mg  40 mg Oral QAM Aliza Brito MD   40 mg at 05/28/20 5822 Physical activity     Days per week: None     Minutes per session: None    Stress: None   Relationships    Social connections     Talks on phone: None     Gets together: None     Attends Zoroastrian service: None     Active member of club or organization: None     Attends meetings of clubs or organizations: None     Relationship status: None    Intimate partner violence     Fear of current or ex partner: None     Emotionally abused: None     Physically abused: None     Forced sexual activity: None   Other Topics Concern    None   Social History Narrative    None      Family History   Problem Relation Age of Onset    Kidney Disease Mother     Heart Disease Mother         Heart Attack     Early Death Father 44        \"Muster Gas\"    Heart Disease Sister         Heart Attack    Early Death Brother 46    Diabetes Brother     Early Death Sister 2    Cancer Brother         Prostate Cancer    Kidney Disease Brother     Diabetes Brother     Diabetes Brother     Heart Disease Brother     Diabetes Son     otherwise irrelevant to this surgical issue. Review of Systems:  Constitutional: Negative for fever, chills, diaphoresis, appetite change and fatigue. HENT: Negative for sore throat, trouble swallowing and voice change. Respiratory: Negative for cough, positive for shortness of breath no wheezing. Cardiovascular: Negative for chest pain positive for SOB with one flight of stairs exertion, no pitting LE edema. Gastrointestinal: Negative for nausea, vomiting, abdominal distention, constipation, no diarrhea, blood in stool, anal bleeding or rectal pain. Musculoskeletal: Negative for joint swelling and arthralgias. Except the left foot, major arthritis, and soft tissue swelling. .   Skin: Warm and dry, well perfused. Except the sole of the left foot, with a large 5 cm deep ulcer. .  Neurological: Negative for seizures, syncope, speech difficulty and weakness.    Hematological/Lymphatic: Negative for adenopathy. No history of DVT/PE. Does not bruise/bleed easily. Psychiatric/Behavioral: Negative for agitation. All others reviewed and negative. Physical Exam:  Vital Signs:   Vitals:    05/28/20 0530   BP: (!) 127/51   Pulse: 70   Resp: 18   Temp: 97.2 °F (36.2 °C)   SpO2: 95%     Body mass index is 37.53 kg/m². General appearance: Pt Alert and oriented, in no apparent acute distress. HEENT:  SHARMIN, Conjunctiva clear. EOMI, No JVDs, Bruits, Megalies: neither thyroid nor lymph nodes. Lungs: Clear to auscultation bilaterally. Heart: Regular rate and rhythm, S1, S2 normal, no murmur, rub or gallop. Abdomen: Non tender. Non distended. Positive bowel sounds. No hernias noted, no masses palpable. Extremities: Warm, well perfused, no cyanosis or edema. Negative for joint swelling and arthralgias. Except the left foot, major arthritis, and soft tissue swelling. .   Skin: Warm and dry, well perfused. Except the sole of the left foot, with a large 5 cm deep ulcer. .  Neurologic: Grossly normal, Cranial nerves from II to XII intact, Deep tendon reflexes normal.  Lymph nodes: No palpable LNs, Cervical, groins, abdominal.  Musculoskeletal: Bilateral Upper and lower extremities ROM within normal limits. Radiologic / Imaging / TESTING  Apparently recently underwent an MRI, will check. .     Labs:    Recent Results (from the past 24 hour(s))   EKG 12 Lead    Collection Time: 05/27/20  7:04 PM   Result Value Ref Range    Ventricular Rate 78 BPM    Atrial Rate 78 BPM    P-R Interval 154 ms    QRS Duration 74 ms    Q-T Interval 410 ms    QTc Calculation (Bazett) 467 ms    P Axis 66 degrees    R Axis 55 degrees    T Axis 49 degrees    Diagnosis       Normal sinus rhythm  Possible Left atrial enlargement  Borderline ECG  When compared with ECG of 02-FEB-2020 17:40,  No significant change was found     CBC Auto Differential    Collection Time: 05/27/20  8:25 PM   Result Value Ref Range    WBC 12.9 (H) 4.0 - 10.5 K/CU MM    RBC 3.51 (L) 4.2 - 5.4 M/CU MM    Hemoglobin 9.3 (L) 12.5 - 16.0 GM/DL    Hematocrit 31.4 (L) 37 - 47 %    MCV 89.5 78 - 100 FL    MCH 26.5 (L) 27 - 31 PG    MCHC 29.6 (L) 32.0 - 36.0 %    RDW 15.9 (H) 11.7 - 14.9 %    Platelets 814 799 - 548 K/CU MM    MPV 9.9 7.5 - 11.1 FL    Differential Type AUTOMATED DIFFERENTIAL     Segs Relative 70.5 (H) 36 - 66 %    Lymphocytes % 16.7 (L) 24 - 44 %    Monocytes % 10.2 (H) 0 - 4 %    Eosinophils % 1.6 0 - 3 %    Basophils % 0.4 0 - 1 %    Segs Absolute 9.1 K/CU MM    Lymphocytes Absolute 2.1 K/CU MM    Monocytes Absolute 1.3 K/CU MM    Eosinophils Absolute 0.2 K/CU MM    Basophils Absolute 0.1 K/CU MM    Nucleated RBC % 0.0 %    Total Nucleated RBC 0.0 K/CU MM    Total Immature Neutrophil 0.08 K/CU MM    Immature Neutrophil % 0.6 (H) 0 - 0.43 %   Comprehensive Metabolic Panel w/ Reflex to MG    Collection Time: 05/27/20  8:25 PM   Result Value Ref Range    Sodium 139 135 - 145 MMOL/L    Potassium 4.5 3.5 - 5.1 MMOL/L    Chloride 101 99 - 110 mMol/L    CO2 26 21 - 32 MMOL/L    BUN 23 6 - 23 MG/DL    CREATININE 1.0 0.6 - 1.1 MG/DL    Glucose 137 (H) 70 - 99 MG/DL    Calcium 8.7 8.3 - 10.6 MG/DL    Alb 3.8 3.4 - 5.0 GM/DL    Total Protein 6.8 6.4 - 8.2 GM/DL    Total Bilirubin 0.4 0.0 - 1.0 MG/DL    ALT 17 10 - 40 U/L    AST 42 (H) 15 - 37 IU/L    Alkaline Phosphatase 89 40 - 129 IU/L    GFR Non- 52 (L) >60 mL/min/1.73m2    GFR African American >60 >60 mL/min/1.73m2    Anion Gap 12 4 - 16   Brain Natriuretic Peptide    Collection Time: 05/27/20  8:25 PM   Result Value Ref Range    Pro-.0 (H) <300 PG/ML   Lactic Acid, Plasma    Collection Time: 05/27/20  8:25 PM   Result Value Ref Range    Lactate 1.1 0.4 - 2.0 mMOL/L   PT - INR    Collection Time: 05/27/20  8:25 PM   Result Value Ref Range    Protime 20.8 (H) 11.7 - 14.5 SECONDS    INR 1.71 INDEX   Troponin    Collection Time: 05/27/20  8:26 PM   Result Value Ref Range    Troponin T <0.010 <0.01

## 2020-05-28 NOTE — ED NOTES
Hospitalist returned call @ [phone] -- transferred call to [facility]  Waiting orders     Raad  05/27/20 3871

## 2020-05-28 NOTE — PROGRESS NOTES
Hospitalist Progress Note      Name:  Johnny Stewart /Age/Sex: 1931  (80 y.o. female)   MRN & CSN:  [id number] & [id number] Admission Date/Time: 2020  5:42 PM   [facility]- PCP: Jazzy Meier MD         Hospital Day: 2    ASSESSMENT & PLAN:   Johnny Stewart is a 80 y.o.  female who was brought to the hospital after she fell. She has a bruise on her right shoulder. She is unable to move her right shoulder. Patient difficulty getting around with a walker. She has fallen at least 6 times within this year. She lives alone. She was discharged from nursing facility this past Thursday. #.  Recurrent mechanical falls--- has known history of Charcot foot. Typically is getting around with a walker. Has poor mobility. -PT/OT    #. Left foot plantar ulcer-- patient nontoxic-appearing. Started on vancomycin and cefepime on admission.  -  Discontinue vancomycin and cefepime  -General surgery consultation for possible surgical intervention    #. Right shoulder pain-- range of motion severely limited. Unable to lift right shoulder. X-ray without acute fracture. Could possibly have a tendon tear or rupture. -MRI of the right shoulder    #. Right lower extremity DVT--involving the popliteal vein. Diagnosed with in 2012. Had been on warfarin. Patient frequently falls. - will discuss with patient about discontinuation of warfarin given recurrent falls and that she had only one episode of RLE DVT in 2012 based on the available records at this point.     #.  Hypertension--on amlodipine    #.  Obesity--weight 225 pounds, BMI 37.6      MEDICAL DECISION MAKING:  -Labs reviewed  -Imaging reviewed  -Level of risk   Diet DIET LOW SODIUM 2 GM;   DVT Prophylaxis [x] Lovenox, []  Heparin, [] SCDs, [] Ambulation   GI Prophylaxis [] PPI,  [] H2 Blocker,  [] Carafate,  [] Diet/Tube Feeds   Code Status DNR-CCA   Disposition  Home   MDM [] Low, [x] Moderate,[]  High     Chief complaint/Interval History/ROS     Chief Complaint: Recurrent falls      INTERVAL HISTORY: Unable to lift the right shoulder. ROS:  No chest pain. No abdominal pain. No nausea. No vomiting. No fevers or chills. Objective: Intake/Output Summary (Last 24 hours) at 5/28/2020 0918  Last data filed at 5/28/2020 0542  Gross per 24 hour   Intake 271 ml   Output --   Net 271 ml      Vitals:   Vitals:    05/28/20 0530   BP: (!) 127/51   Pulse: 70   Resp: 18   Temp: 97.2 °F (36.2 °C)   SpO2: 95%     Physical Exam:   GEN: Awake female, sitting in chair at bedside. Nontoxic-appearing. Pleasant. Answers questions appropriately. EYES: No eye discharge. HENT: No nasal discharge. Normocephalic atraumatic. NECK: Supple,  RESP: Clear to auscultation bilaterally. CV: Regular rate and rhythm. No murmur. Bilateral lower extremity edema. GI: Abdomen soft. Nontender. : No Aguilera in place. HEME/LYMPH: Right upper extremity ecchymosis/bruising. MSK: Decreased range of motion of the right shoulder. SKIN: warm, dry, no rashes, left foot plantar ulcer.   NEURO: Cranial nerves appear grossly intact, normal speech,   PSYCH: Awake, alert, oriented    Medications:   Medications:    amLODIPine  5 mg Oral Daily    pantoprazole  40 mg Oral QAM    pregabalin  150 mg Oral BID    sodium chloride flush  10 mL Intravenous 2 times per day    [START ON 5/29/2020] enoxaparin  40 mg Subcutaneous Daily      Infusions:   PRN Meds: sodium chloride flush, 10 mL, PRN  acetaminophen, 650 mg, Q6H PRN    Or  acetaminophen, 650 mg, Q6H PRN  polyethylene glycol, 17 g, Daily PRN  ondansetron, 4 mg, Q8H PRN    Or  ondansetron, 4 mg, Q6H PRN  morphine, 2 mg, Q2H PRN    Or  morphine, 4 mg, Q2H PRN      Electronically signed by Janelle Whitehead MD on 5/28/2020 at 9:18 AM

## 2020-05-29 NOTE — ANESTHESIA PRE PROCEDURE
Department of Anesthesiology  Preprocedure Note       Name:  Yamileth Herrera   Age:  80 y.o.  :  1931                                          MRN:  [id number]         Date:  2020      Surgeon: Henry Vasquez):  Navi Vera MD    Procedure: Procedure(s):  FOOT DEBRIDEMENT INCISION AND DRAINAGE LEFT    Medications prior to admission:   Prior to Admission medications    Medication Sig Start Date End Date Taking? Authorizing Provider   pregabalin (LYRICA) 150 MG capsule Take 1 capsule by mouth 2 times daily. 2/3/20 10/29/22  CHUCK Matos - CNP   lidocaine 4 % external patch Place 1 patch onto the skin daily 20   Tucker Small MD   docusate sodium (COLACE, DULCOLAX) 100 MG CAPS Take 100 mg by mouth 2 times daily 18   Nia Torres MD   amLODIPine (NORVASC) 10 MG tablet Take 1 tablet by mouth daily 18   Nia Torres MD   warfarin (COUMADIN) 5 MG tablet Take 5 mg by mouth daily    Historical MD Dianna   acetaminophen (TYLENOL) 500 MG tablet Take 2 tablets by mouth every 6 hours as needed for Pain or Fever 16   Nish Cohen MD   pantoprazole (PROTONIX) 40 MG tablet Take 1 tablet by mouth every morning 16   Nish Cohen MD   atenolol (TENORMIN) 25 MG tablet Take 25 mg by mouth every morning     Historical Provider, MD       Current medications:    No current facility-administered medications for this visit. No current outpatient medications on file.      Facility-Administered Medications Ordered in Other Visits   Medication Dose Route Frequency Provider Last Rate Last Dose    piperacillin-tazobactam (ZOSYN) 3.375 g in dextrose 5 % 50 mL IVPB (mini-bag)  3.375 g Intravenous Once Navi Vera MD        lactated ringers infusion   Intravenous Continuous Adal Vega MD        amLODIPine (NORVASC) tablet 5 mg  5 mg Oral Daily Sukhdev Lawson MD   5 mg at 20 0947    pantoprazole (PROTONIX) tablet 40 mg  40 mg Oral QAM Sukhdev Lawson MD   40 mg at Z76.14    Fall at home, subsequent encounter W19. XXXD, Y92.009    Fall (on)(from) incline, initial encounter W10. 2XXA    Cellulitis L03.90       Past Medical History:        Diagnosis Date    Acid reflux     Anesthesia     \"Combative After Anesthesia\"    Arthritis     \"All Over My Body\"    Diverticulosis     HTN (hypertension)     Hx of blood clots 2000's    \"Behind Right Knee\"    Kidney stones     Passed Kidney Stones In 1970's    Lower back pain     \"Sometimes\"    Neuropathy     bilateral feet    Osteoporosis     IV Reclast Once A Year, Last Dose Received In 2014    Purpura ([address]     \"Arms\"    Shingles     \"Twice, First Time  In 1972 Left Breast Area, Second Time In 2000 Right Lower Abdomen\"    Skin Cancer Excised Back Of Right Leg 2000's    Teeth missing     Upper And Lower    Urinary incontinence     Wears dentures     Full Upper    Wears glasses        Past Surgical History:        Procedure Laterality Date    APPENDECTOMY  1941    BLADDER SUSPENSION  1990's    BREAST CYST EXCISION Right 1955    Benign    COLONOSCOPY  Last Done In 2000's    Polpys Removed In Past    DENTAL SURGERY      Teeth Extracted In Past    DILATION AND CURETTAGE OF UTERUS      \"2 Or 3 \" Prior To DUANE In 1970's    FRACTURE SURGERY Left 7-7-12    Broken Left Femur With Hardware    HYSTERECTOMY, TOTAL ABDOMINAL  1970's    JOINT REPLACEMENT Right 12-2-14    Total Right Knee    OTHER SURGICAL HISTORY  11/23/15    Robotic Assisted Laparoscopic Sacrocolpopexy    OTHER SURGICAL HISTORY Right 09/04/2018    orif right patella    OVARIAN CYST SURGERY Right 1957    \"Right\"    SKIN CANCER EXCISION  2000's    Skin Cancer Excised Back Of Right Leg    TOE AMPUTATION Right 1990's Or 2000's    Right Foot Little Toe    TOE AMPUTATION Right 5-23-13    Second Toe    TONSILLECTOMY  1962    TOTAL HIP ARTHROPLASTY Right 08/21/2016       Social History:    Social History     Tobacco Use    Smoking status: Never Smoker    Smokeless tobacco: Never Used   Substance Use Topics    Alcohol use: No                                Counseling given: Not Answered      Vital Signs (Current): There were no vitals filed for this visit. BP Readings from Last 3 Encounters:   05/29/20 (!) 132/91   02/03/20 (!) 164/56   01/06/20 135/61       NPO Status:                                                                                 BMI:   Wt Readings from Last 3 Encounters:   05/28/20 225 lb 8 oz (102.3 kg)   02/03/20 203 lb 1.6 oz (92.1 kg)   01/06/20 204 lb (92.5 kg)     There is no height or weight on file to calculate BMI.    CBC:   Lab Results   Component Value Date    WBC 12.9 05/27/2020    RBC 3.51 05/27/2020    HGB 9.3 05/27/2020    HCT 31.4 05/27/2020    MCV 89.5 05/27/2020    RDW 15.9 05/27/2020     05/27/2020       CMP:   Lab Results   Component Value Date     05/27/2020    K 4.5 05/27/2020     05/27/2020    CO2 26 05/27/2020    BUN 23 05/27/2020    CREATININE 1.0 05/27/2020    GFRAA >60 05/27/2020    LABGLOM 52 05/27/2020    GLUCOSE 137 05/27/2020    PROT 6.8 05/27/2020    PROT 6.6 12/27/2011    CALCIUM 8.7 05/27/2020    BILITOT 0.4 05/27/2020    ALKPHOS 89 05/27/2020    AST 42 05/27/2020    ALT 17 05/27/2020       POC Tests: No results for input(s): POCGLU, POCNA, POCK, POCCL, POCBUN, POCHEMO, POCHCT in the last 72 hours.     Coags:   Lab Results   Component Value Date    PROTIME 20.8 05/27/2020    PROTIME 11.2 12/27/2011    INR 1.71 05/27/2020    APTT 57.2 08/20/2016       HCG (If Applicable): No results found for: PREGTESTUR, PREGSERUM, HCG, HCGQUANT     ABGs: No results found for: PHART, PO2ART, KPL7NTW, IPV5GUN, BEART, K6DAWNYM     Type & Screen (If Applicable):  No results found for: LABABO, LABRH    Drug/Infectious Status (If Applicable):  No results found for: HIV, HEPCAB    COVID-19 Screening (If Applicable): No results found for: COVID19      Anesthesia

## 2020-05-29 NOTE — ANESTHESIA POSTPROCEDURE EVALUATION
Department of Anesthesiology  Postprocedure Note    Patient: Sergio Magana  MRN: [id number]  YOB: 1931  Date of evaluation: 5/29/2020  Time:  3:26 PM     Procedure Summary     Date:  05/29/20 Room / Location:  [address]    Anesthesia Start:  0060 Anesthesia Stop:  9138    Procedure:  FOOT ABSCESS DEBRIDEMENT INCISION AND DRAINAGE LEFT, EXCISIONAL BIOPSY OF THE LEFT FOOT MASS (Left ) Diagnosis:  (foot abscess)    Surgeon:  Tiffanie Damon MD Responsible Provider:  CHUCK Gibson CRNA    Anesthesia Type:  general, MAC ASA Status:  3          Anesthesia Type: general, MAC    Jennifer Phase I:      Jennifer Phase II:      Last vitals: Reviewed and per EMR flowsheets.        Anesthesia Post Evaluation    Patient location during evaluation: bedside  Patient participation: complete - patient participated  Level of consciousness: awake and alert  Pain score: 0  Airway patency: patent  Nausea & Vomiting: no vomiting and no nausea  Complications: no  Cardiovascular status: blood pressure returned to baseline and hemodynamically stable  Respiratory status: acceptable, room air, spontaneous ventilation and nonlabored ventilation  Hydration status: stable

## 2020-05-29 NOTE — PROGRESS NOTES
back  Bathroom Toilet: Handicap height  Bathroom Accessibility: Accessible  Home Equipment: 4 wheeled walker, Rolling walker  ADL Assistance: Independent  Homemaking Assistance: Independent  Homemaking Responsibilities: Yes  Ambulation Assistance: Independent(w/ rollator )  Transfer Assistance: Independent  Active : No  Patient's  Info: friends  Occupation: Retired  Additional Comments: Pt reports 4 falls in the last week. Pt is unsure of why  Short term goals  Time Frame for Short term goals: 1 week  Short term goal 1: Pt will perform bed mobility Gigi   Short term goal 2: Pt will transfer to all surfaces SBA   Short term goal 3: Pt will ambulate 50ft with RW SBA   Short term goal 4: Pt will complete standing dynamic activity x 2 minutes SBA, single UE support.         Electronically signed by:    Alexandre Chavez PTA  5/29/2020, 9:37 AM

## 2020-05-29 NOTE — PROGRESS NOTES
Occupational Therapy  . Occupational Therapy Treatment Note  Name: Karin Burr MRN: [id number] :   1931   Date:  2020   Admission Date: 2020 Room:  [address] [place] [address]-A   Restrictions/Precautions:    WBAT on LLE per Dr Escobar Del Valle. General Precautions, Fall Risk    Communication with other providers:  RYAN Zapata cleared pt for OT Tx session. Subjective:  Patient states:  \"I guess I'm having surgery today. \" Pt expressed frustration. When asked about weight bearing restrictions, pt identifies \"weight bearing as tolerated\". Pain:   Location, Type, Intensity (0/10 to 10/10):  0/10, denied pain    Objective:    Observation:  Pt received reclined in bedside chair, required numerous verbal / tactile cues to arouse. Once awake, pt actively participated. Objective Measures:  Gauze dressing to L foot. Treatment, including education:  Therapeutic Activity Training:   Therapeutic activity training was instructed today. Cues were given for safety, sequence, UE/LE placement, awareness, and balance. Activities performed today included bed mobility training, sup-sit, sit-stand, SPT. Supine to sit: N/A  Sit to supine: Mod A for BLE  Scooting: SBA  Sit to stands: CGA c RW  Stand to sits: CGA c RW  SPT: N/A  Functional Mobility: CGA c RW ~10 ft x2 trials  Pt required occasional vc's for safe body positioning. Self Care Training:   Cues were given for safety, sequence, UE/LE placement, visual cues, and balance. Activities performed today included toileting, hand hygiene, and grooming. Toilet Transfer: CGA c RW + cues for grab bar  Toileting: Mod A for clothing mgmt  Grooming: CGA in stance at sink for hand hygiene. All therapeutic intervention performed c emphasis on dynamic balance / standing tolerance to inc strength, endurance and act tolerance for inc Indep c ADL tasks, func transfers / mobility.      Safety  Patient safely in bed + alarm at end of session, with call light/phone in reach, and nursing

## 2020-05-29 NOTE — OP NOTE
Incision and Drainage of Procedure Note    Keon Skelton, 5/11/1931, 80 y.o.,  female, CSN: [id number]  May 29, 2020    Pre-operative Diagnosis: Chronic infected ulcer in her left sole of foot, and a major mass effect longstanding    Post-operative Diagnosis: same - Pathology pending. Anaesthesia: LMA + Local using 1% Lidocaine with epinephrine, 0.5% Marcaine, mixed 50%-50%      Procedure: 1) Incision and Drainage of the 6 cm x 7 cm un-loculated infected necrotic left foot infected ulcer, Gram stain/C&S, aerobic and anaerobic cultures sent; Pulsed irrigation with 3 L of diluted betadine, packing the wound with 1\" Iodoform. 2) Excisional biopsy of the surrounding wound \"tissue\" / \"mass\" to pathology. Surgeon: Sarah Agrawal MD    Estimated Blood Loss:  Less than 5 ml. Complications:  None; patient tolerated the procedure well. Condition: Stable to PACU. Procedure Details: The patient was positioned supine oon the operating room table, and the skin over the abscess site was prepped with betadine and draped in a sterile fashion. Local anesthesia was used by injecting intradermal, then subcutaneously the above mentioned anesthetic mixture. An incision (1 cm) was then made superiorly and laterally over the greatest area of fluctuance along Moy's lines, and approximately 10-15 cc of foul smelling, purulent and yellow/green liane pus was expressed. It was sent for Gram stain, Cultures and Sensitivity, aerobic and anaerobic cultures, No Loculations were present. The wound was debrided with the curette, and then 3 pieces of tissue were excised and sent to pathology to r/o neoplastic pathology. . The drainage cavity was then profusely irrigated with diluted Betadine solution (60 ml of Betadine in 3000 ml of Sterile Saline) using the pulse-vac pulsed irrigation.  Adequate hemostasis was assured, and the wound was then packed with 1\" Iodoform, followed by sterile 4\"x4\" gauzes and paper tape. The patient tolerated the procedure very well without any complications, was extubated in the OR, and was sent to the PACU in a stable condition.       ____________________________________________    Sherine Solorzano MD, FACS, [facility]    5/29/2020  2:31 PM

## 2020-05-29 NOTE — PROGRESS NOTES
Nucleated RBC 0.0 K/CU MM    Total Immature Neutrophil 0.08 K/CU MM    Immature Neutrophil % 0.6 (H) 0 - 0.43 %   Comprehensive Metabolic Panel w/ Reflex to MG    Collection Time: 05/27/20  8:25 PM   Result Value Ref Range    Sodium 139 135 - 145 MMOL/L    Potassium 4.5 3.5 - 5.1 MMOL/L    Chloride 101 99 - 110 mMol/L    CO2 26 21 - 32 MMOL/L    BUN 23 6 - 23 MG/DL    CREATININE 1.0 0.6 - 1.1 MG/DL    Glucose 137 (H) 70 - 99 MG/DL    Calcium 8.7 8.3 - 10.6 MG/DL    Alb 3.8 3.4 - 5.0 GM/DL    Total Protein 6.8 6.4 - 8.2 GM/DL    Total Bilirubin 0.4 0.0 - 1.0 MG/DL    ALT 17 10 - 40 U/L    AST 42 (H) 15 - 37 IU/L    Alkaline Phosphatase 89 40 - 129 IU/L    GFR Non- 52 (L) >60 mL/min/1.73m2    GFR African American >60 >60 mL/min/1.73m2    Anion Gap 12 4 - 16   Brain Natriuretic Peptide    Collection Time: 05/27/20  8:25 PM   Result Value Ref Range    Pro-.0 (H) <300 PG/ML   Lactic Acid, Plasma    Collection Time: 05/27/20  8:25 PM   Result Value Ref Range    Lactate 1.1 0.4 - 2.0 mMOL/L   PT - INR    Collection Time: 05/27/20  8:25 PM   Result Value Ref Range    Protime 20.8 (H) 11.7 - 14.5 SECONDS    INR 1.71 INDEX   Troponin    Collection Time: 05/27/20  8:26 PM   Result Value Ref Range    Troponin T <0.010 <0.01 NG/ML   Urinalysis Reflex to Culture    Collection Time: 05/27/20 10:00 PM   Result Value Ref Range    Color, UA YELLOW YELLOW    Clarity, UA HAZY (A) CLEAR    Glucose, Urine NEGATIVE NEGATIVE MG/DL    Bilirubin Urine NEGATIVE NEGATIVE MG/DL    Ketones, Urine NEGATIVE NEGATIVE MG/DL    Specific Gravity, UA 1.018 1.001 - 1.035    Blood, Urine SMALL (A) NEGATIVE    pH, Urine 5.0 5.0 - 8.0    Protein, UA NEGATIVE NEGATIVE MG/DL    Urobilinogen, Urine NORMAL 0.2 - 1.0 MG/DL    Nitrite Urine, Quantitative POSITIVE (A) NEGATIVE    Leukocyte Esterase, Urine MODERATE (A) NEGATIVE    RBC, UA 2 0 - 6 /HPF    WBC, UA 9 (H) 0 - 5 /HPF    Bacteria, UA MANY (A) NEGATIVE /HPF    Mucus, UA RARE (A)

## 2020-05-30 PROBLEM — L03.116 CELLULITIS OF LEFT FOOT: Status: ACTIVE | Noted: 2020-01-01

## 2020-05-30 NOTE — PROGRESS NOTES
GENERAL SURGERY PROGRESS NOTE    CC/HPI:           Patient feels better from yesterday's surgery. Vitals:    05/29/20 0545 05/29/20 0810 05/29/20 2101 05/30/20 0545   BP: (!) 147/66 (!) 132/91 (!) 159/69 (!) 148/73   Pulse: 82 81 95 89   Resp: 18 18 18 16   Temp: 98 °F (36.7 °C) 98 °F (36.7 °C) 98.2 °F (36.8 °C) 98.3 °F (36.8 °C)   TempSrc: Oral  Oral Oral   SpO2: 92% 94%     Weight:       Height:         I/O last 3 completed shifts: In: 100 [I.V.:100]  Out: 126 [Urine:126]  No intake/output data recorded. No diet orders on file    No results found for this or any previous visit (from the past 48 hour(s)). Scheduled Meds:   sennosides-docusate sodium  2 tablet Oral BID    amLODIPine  5 mg Oral Daily    pantoprazole  40 mg Oral QAM    pregabalin  150 mg Oral BID    sodium chloride flush  10 mL Intravenous 2 times per day    enoxaparin  40 mg Subcutaneous Daily       Continuous Infusions:   lactated ringers 100 mL/hr at 05/30/20 0353       Physical Exam:  HEENT: Anicteric sclerae, Oropharyngeal mucosae moist, pink and intact. Heart:  Normal S1 and S2, RRR  Lungs: Clear to auscultation bilaterally, No audible Wheezes or Rales. Extremities: No edema. Neuro: Alert and Oriented x 3, Non focal.  Abdomen: Soft, Benign, Non tender, Non distended, Positive bowel sounds. Incision: Nicely healing: No erythema, No purulent discharge. Active Problems:    Cellulitis  Resolved Problems:    * No resolved hospital problems. *      Assessment and Plan:  Tamar Espinal is a 80 y.o. female who is POD # 1 status post:  1) Incision and Drainage of the 6 cm x 7 cm un-loculated infected necrotic left foot infected ulcer, Gram stain/C&S, aerobic and anaerobic cultures sent; Pulsed irrigation with 3 L of diluted betadine, packing the wound with 1\" Iodoform. 2) Excisional biopsy of the surrounding wound \"tissue\" / \"mass\" to pathology. .    Awaiting Pathology; continue Abx, and local wound care.     Increase

## 2020-05-30 NOTE — PROGRESS NOTES
discontinue warfarin. Falling lately and at times status ahead. She has no documented history of A. fib. 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 Anticoagulation to be discontinued on discharge. #.  Hypertension--on amlodipine    #.  Obesity--weight 225 pounds, BMI 37.6      MEDICAL DECISION MAKING:  -Labs reviewed  -Imaging reviewed  -Level of risk moderate  Diet DIET GENERAL;   DVT Prophylaxis [x] Lovenox, []  Heparin, [] SCDs, [] Ambulation   GI Prophylaxis [] PPI,  [] H2 Blocker,  [] Carafate,  [] Diet/Tube Feeds   Code Status DNR-CCA   Disposition  Home   MDM [] Low, [x] Moderate,[]  High     Chief complaint/Interval History/ROS     Chief Complaint: Recurrent falls      INTERVAL HISTORY: Underwent incision and drainage of right foot ulcer. Patient is approved for acute rehab unit downstairs. However the patient and her daughter does not want to go there. They are requesting [address] [place] [address]. ROS:  No chest pain. No abdominal pain. No nausea. No vomiting. No fevers or chills. Objective: Intake/Output Summary (Last 24 hours) at 5/30/2020 1012  Last data filed at 5/29/2020 2120  Gross per 24 hour   Intake 100 ml   Output 125 ml   Net -25 ml      Vitals:   Vitals:    05/30/20 0959   BP: (!) 164/75   Pulse: 89   Resp: 16   Temp: 98.9 °F (37.2 °C)   SpO2: 94%     Physical Exam:   GEN: Awake female, no acute distress. Pleasant. EYES: Ocular muscles intact. HENT: No nasal discharge. Normocephalic atraumatic. NECK: Supple,  RESP: Clear to auscultation bilaterally. CV: Regular rate and rhythm. No murmur. Bilateral lower extremity edema. GI: Abdomen soft. Nontender. : No Aguilera in place. HEME/LYMPH: Right upper extremity ecchymosis/bruising. MSK: Range of motion of the right shoulder intact. SKIN: warm, dry, no rashes, left foot plantar ulcer.   NEURO: Cranial nerves appear grossly intact, normal speech,   PSYCH: Awake, alert, oriented    Medications:   Medications:    cefepime  1 g Intravenous Q12H    sennosides-docusate sodium  2 tablet Oral BID    amLODIPine  5 mg Oral Daily    pantoprazole  40 mg Oral QAM    pregabalin  150 mg Oral BID    sodium chloride flush  10 mL Intravenous 2 times per day    enoxaparin  40 mg Subcutaneous Daily      Infusions:   PRN Meds: HYDROmorphone, 1 mg, Q2H PRN  HYDROmorphone, 0.5 mg, Q2H PRN  povidone-iodine, , PRN  hydrogen peroxide, , PRN  sodium chloride flush, 10 mL, PRN  polyethylene glycol, 17 g, Daily PRN  ondansetron, 4 mg, Q8H PRN    Or  ondansetron, 4 mg, Q6H PRN  oxyCODONE-acetaminophen, 1 tablet, Q4H PRN      Electronically signed by Luis F Bose MD on 5/30/2020 at 10:12 AM

## 2020-05-30 NOTE — CARE COORDINATION
LSW notified that Pt is able to discharge to ARU pending negative COVID test.    LSW notified hospitlist vis [facility].     LSW told that Pt and dgt would like [facility]. Call to Pt in room informed Pt that at this time [facility] is not accepting new Pt. Pt gave LSW permission to speak with her dgt. Call to Haylee/dgt. And informed dgt that [facility] is not accepting any new Pt. Davide Hernadez wanted her to go to [facility] as they are trying to transition to AL. Pt dgt did not have another SNF choice. Pt dgt is fine with the Pt going to ARU if medically stable. Pt dgt will continue to look for SNF with AL. LSW notified the hospitlist that Pt is to discharge to ARU. Pt dgt to notify the Pt.      Electronically signed by TREMAINE Barnard on 5/30/2020 at 11:14 AM

## 2020-05-31 PROBLEM — E66.01 MORBID OBESITY DUE TO EXCESS CALORIES (HCC): Status: ACTIVE | Noted: 2020-01-01

## 2020-05-31 NOTE — PROGRESS NOTES
Shower/Tub: Walk-in shower, Shower chair with back  [employer]: Handicap height  Bathroom Equipment: 3-in-1 commode  Bathroom Accessibility: Accessible  Home Equipment: Rolling walker, 4 wheeled walker, Reacher  Receives Help From: Family(Daughter grocery shops for her)  ADL Assistance: Independent  Homemaking Assistance: Independent  Homemaking Responsibilities: Yes  Meal Prep Responsibility: Primary  Laundry Responsibility: Primary  Cleaning Responsibility: Primary  Bill Paying/Finance Responsibility: Primary  Ambulation Assistance: Independent  Transfer Assistance: Independent  Active : No  Occupation: Retired  Type of occupation: Patient worked at [employer] in the [place] [employer] Enjoys being outside, working on her yard, going out with friends  Additional Comments: Pt reports 4 fall within the last week, unsure why     Restrictions:  Restrictions/Precautions  Restrictions/Precautions: Fall Risk, Up as Tolerated, General Precautions, Weight Bearing  Lower Extremity Weight Bearing Restrictions  Left Lower Extremity Weight Bearing: (FWBing LLE)               Pain Level: 9  Pain Location: Foot, Pelvis    Objective:  Observation/Palpation  Posture: Fair             Vision  Vision: Impaired  Vision Exceptions: Wears glasses for reading  Vision - Basic Assessment  Prior Vision: No visual deficits  Visual History: No significant visual history  Patient Visual Report: No visual complaint reported. Hearing  Hearing: Within functional limits    ROM:      LUE AROM (degrees)  LUE AROM : WFL        RUE PROM (degrees)  RUE PROM: WFL  RUE AROM (degrees)  RUE AROM : Exceptions  RUE General AROM: Limited ROM d/t pain/discomfort from falling on R side   R Shoulder Flexion 0-180: 90          Strength:    LUE Strength  Gross LUE Strength: WFL  L Hand General: 4-/5  RUE Strength  Gross RUE Strength: Exceptions to [facility]  R Hand General: 3-/5    Quality of Movement:   Tone RUE  RUE Tone: Normotonic  Tone LUE  LUE Transfer: Contact guard assistance     Shower Transfers  Shower - Transfer From: [place] Baez - Transfer Type: To and From  Shower - Transfer To: Shower seat with back  Shower - Technique: Ambulating  Shower Transfers: Contact Guard     Shower transfer: Transfers  Stand Step Transfers: Contact guard assistance  Stand Pivot Transfers: Contact guard assistance  Sit to stand: Contact guard assistance  Stand to sit: Contact guard assistance    Functional Mobility:    Balance  Sitting Balance: Stand by assistance  Standing Balance: Contact guard assistance  Standing Balance  Time: <1min  Activity: Grooming tasks at sink  Comment: Pt unsteady and weak. Pt completed total groomig care seated in wc. Functional Mobility  Functional - Mobility Device: Rolling Walker  Activity: To/from bathroom  Assist Level: Contact guard assistance     Cognition:  Cognition  Overall Cognitive Status: WFL      Assessment:     Performance deficits / Impairments: Decreased functional mobility , Decreased strength, Decreased endurance, Decreased ADL status, Decreased coordination, Decreased ROM, Decreased balance    The patient is a 80year old female admitted onto ARU after hospitalization for ulcer on L foot. Pt Stated that \"this is a lot of therapy for one person\", however was educated on benefits of ARU therapy and return to home Ind. Pt requires min-mod assist for LE ADL at this time d/t decreased strength/endurance. Pt states she has had several falls within the last year that are unexplained, however has caused some decreased ROM in R arm d/t falling on the R side. Pt lives alone and enjoys being outside/going out with daughter/friends. Pt is has some self limiting behaviors, but demo potential to improved activity tolerance with ARU therapy to regain strength & endurance and practice using compensatory strategies to increase Ind with ADLs.            Prognosis: Good  Decision Making: Low Complexity  Clinical Presentation:   History:

## 2020-05-31 NOTE — PROGRESS NOTES
Message to Dr. Whitney Burk regarding pt Left Foot and dressing change. Dressing change to be done twice a day per day, as note in the message below. 0'\"   5/31/20 11:39 AM   [phone] From: Taryn Hernanedz [facility] RE: Preethi Moran Reference Number: [id number] Just wanting to confirm the orders that we just spoke about for Preethi Moran. Cleanse Left foot twice a day with Betadine diluted in normal saline and pack with 1inch Iodoform and cover with kerlix. Read 11:39 AM   0'\"   5/31/20 11:41 AM   Yes excellent But also hydrogen peroxide after the betadine Thanks ? ?

## 2020-05-31 NOTE — PROGRESS NOTES
GENERAL SURGERY PROGRESS NOTE    CC/HPI:           Patient feels better from the day before yesterday's surgery. Vitals:    05/30/20 1600 05/30/20 1609 05/31/20 0500   BP: (!) 152/67  (!) 169/71   Pulse: 82 82 82   Resp: 18  18   Temp: 98.6 °F (37 °C)  98.8 °F (37.1 °C)   TempSrc: Oral  Oral   SpO2: 95%  91%   Weight: 225 lb 12.8 oz (102.4 kg)     Height: 5' 5\" (1.651 m)       I/O last 3 completed shifts: In: 240 [P.O.:240]  Out: -   No intake/output data recorded.     DIET GENERAL;    Recent Results (from the past 48 hour(s))   Urinalysis    Collection Time: 05/30/20 10:03 AM   Result Value Ref Range    Color, UA YELLOW YELLOW    Clarity, UA CLEAR CLEAR    Glucose, Urine NEGATIVE NEGATIVE MG/DL    Bilirubin Urine NEGATIVE NEGATIVE MG/DL    Ketones, Urine NEGATIVE NEGATIVE MG/DL    Specific Gravity, UA 1.008 1.001 - 1.035    Blood, Urine NEGATIVE NEGATIVE    pH, Urine 9.0 (HH) 5.0 - 8.0    Protein, UA NEGATIVE NEGATIVE MG/DL    Urobilinogen, Urine NORMAL 0.2 - 1.0 MG/DL    Nitrite Urine, Quantitative NEGATIVE NEGATIVE    Leukocyte Esterase, Urine NEGATIVE NEGATIVE    RBC, UA 1 0 - 6 /HPF    WBC, UA 1 0 - 5 /HPF    Bacteria, UA NEGATIVE NEGATIVE /HPF    Squam Epithel, UA 1 /HPF    Mucus, UA RARE (A) NEGATIVE HPF    Trichomonas, UA NONE SEEN NONE SEEN /HPF   COVID-19    Collection Time: 05/30/20 11:15 AM   Result Value Ref Range    Source NASOPHARYNGEAL SWAB     SARS-CoV-2, NAAT NOT DETECTED    CBC    Collection Time: 05/31/20  5:24 AM   Result Value Ref Range    WBC 8.8 4.0 - 10.5 K/CU MM    RBC 3.74 (L) 4.2 - 5.4 M/CU MM    Hemoglobin 9.9 (L) 12.5 - 16.0 GM/DL    Hematocrit 33.9 (L) 37 - 47 %    MCV 90.6 78 - 100 FL    MCH 26.5 (L) 27 - 31 PG    MCHC 29.2 (L) 32.0 - 36.0 %    RDW 15.9 (H) 11.7 - 14.9 %    Platelets 555 771 - 413 K/CU MM    MPV 9.6 7.5 - 11.1 FL   Basic metabolic panel    Collection Time: 05/31/20  5:24 AM   Result Value Ref Range    Sodium 143 135 - 145 MMOL/L    Potassium 4.3 3.5 -

## 2020-05-31 NOTE — PROGRESS NOTES
Layout: One level  Home Access: Level entry  Bathroom Shower/Tub: Walk-in shower, Shower chair with back  [employer]: Handicap height  Bathroom Equipment: 3-in-1 commode  Bathroom Accessibility: Accessible  Home Equipment: Rolling walker, 4 wheeled walker, Reacher  Receives Help From: Family(Daughter grocery shops for her)  ADL Assistance: Independent  Homemaking Assistance: Independent  Homemaking Responsibilities: Yes  Meal Prep Responsibility: Primary  Laundry Responsibility: Primary  Cleaning Responsibility: Primary  Bill Paying/Finance Responsibility: Primary  Ambulation Assistance: Independent  Transfer Assistance: Independent  Active : No  Occupation: Retired  Type of occupation: Patient worked at [employer] in the [place] [employer] Enjoys being outside, working on her yard, going out with friends  Additional Comments: Pt reports 4 fall within the last week, unsure why     Restrictions:  Restrictions/Precautions  Restrictions/Precautions: Fall Risk, Up as Tolerated, General Precautions, Weight Bearing            Pain Level: 9  Pain Location: Foot, Pelvis    Objective:           Vision  Vision: Impaired  Vision Exceptions: Wears glasses for reading  Hearing  Hearing: Within functional limits    ROM:      AROM RLE (degrees)  RLE AROM: WFL     AROM LLE (degrees)  LLE AROM : WFL                 Strength:    Strength RLE  Strength RLE: Exception  Comment: 3+  Strength LLE  Comment: 3+        Strength Other  Other: core 3+     Bed Mobility:   Lying to Sitting on Side of Bed  Assistance Needed: Supervision or touching assistance  Comment: cga Supine<>Sit for verbal cues with technique  CARE Score: 4  Discharge Goal: Independent  Roll Left and Right  Assistance Needed: Supervision or touching assistance  Comment: CGA and vebal cueing for rolling L<>R and technique  CARE Score: 4  Discharge Goal: Independent  Sit to Lying  Assistance Needed: Supervision or touching assistance  Comment: cga Supine<>Sit for verbal cues with technique  CARE Score: 4  Discharge Goal: Independent    Transfers:    Sit to Stand  Assistance Needed: Supervision or touching assistance  Comment: CGA for verbal cues for technique  CARE Score: 4  Discharge Goal: Independent  Chair/Bed-to-Chair Transfer  Assistance Needed: Supervision or touching assistance  Comment: cga for verbal cues with technique  CARE Score: 4  Discharge Goal: Independent     Car Transfer  Assistance Needed: Supervision or touching assistance  Comment: cga for verbal cues with technique  CARE Score: 4  Discharge Goal: Independent    Ambulation:    Walking Ability  Does the Patient Walk?: Yes     Walk 10 Feet  Assistance Needed: Supervision or touching assistance  Comment: Patient ambulated with 2ww cga for safety and verbal cueing due to hx of falls 166ft  CARE Score: 4  Discharge Goal: Independent     Walk 50 Feet with Two Turns  Assistance Needed: Supervision or touching assistance  Comment: Patient ambulated with 2ww cga for safety and verbal cueing due to hx of falls 166ft  CARE Score: 4  Discharge Goal: Independent     Walk 150 Feet  Assistance Needed: Supervision or touching assistance  Comment: Patient ambulated with 2ww cga for safety and verbal cueing due to hx of falls 166ft  CARE Score: 4  Discharge Goal: Independent     Walking 10 Feet on Uneven Surfaces  Assistance Needed: Supervision or touching assistance  CARE Score: 4  Discharge Goal: Independent     1 Step (Curb)  Comment: Patient attempted to ascend 1 step but was unable to perform and appeared unsafe this date with stair attempt [address]  Reason if not Attempted: Not attempted due to environmental limitations  CARE Score: 10  Discharge Goal: Independent     4 Steps  Reason if not Attempted: Not attempted due to medical condition or safety concerns  CARE Score: 88  Discharge Goal: Supervision or touching assistance     12 Steps  Reason if not Attempted: Not applicable  CARE Score: 9  Discharge

## 2020-05-31 NOTE — PLAN OF CARE
ARU Interdisciplinary Plan of Care (IPOC)  [facility] Dr. Jessie Lamb [facility], [address]  [phone]  Fax: [phone]        Genesis Diaz    : 1931  Acct #: [de-identified]  MRN: [id number]   PHYSICIAN:  Pricilla Ray MD  Primary Active Problems:   Active Hospital Problems    Diagnosis Date Noted    Cellulitis of left foot [L03.116] 2020       Rehabilitation Diagnosis:     Cellulitis of left foot [U65.965]       ADMIT DATE:2020   CARE PLAN     NURSING:  Genesis Emily while on this unit will:      Bowel and Bladder   [x] Be continent of bowel and bladder      [x] Have an adequate number of bowel movements   [] Urinate with no urinary retention >300ml in bladder   [] Bladder Scan: (details)   [] Complete bladder protocol with suggs removal   [] Initiate Bladder Program to toilet every ___ hours   [] Initiate Bowel Program to toilet every ___hours   [] Bladder training    [] Bowel training  Pulmonary   [x] Maintain O2 SATs at 92% or greater  Pain Management   [x] Have pain managed while on ARU        [x] Be pain free by discharge    [x] Medication Management and Education  Maintenance of Skin Integrity/Wound Management   [x] Have no skin breakdown while on ARU   [x] Have improved skin integrity via wound measurements   [x] Have no signs/symptoms of infection via infection protection and monitoring at the          wound site  Fall Prevention   [x] Be free from injury during hospitalization via fall prevention measures     [x] Disease management and Education  Precautions   [] Weight Bearing Precautions   [] Swallowing Precautions   [x] Monitoring of Risks of Complications   [x] DVT Prophylaxis    [x] Fluid/electrolyte/Nutrition Management    [x] Complete education with patient/family with understanding demonstrated for          in-room safety with transfers to bed, toilet, wheelchair, shower as well as                bathroom activities and hygiene. [] Adjustment   [] Other:   Nursing interventions may include bowel/bladder training, education for medical assistive devices, medication education, O2 saturation management, energy conservation, stress management techniques, fall prevention, alarms protocol, seating and positioning, skin/wound care, pressure relief instruction,dressing changes,  infection protection, DVT prophylaxis, and/or assistance with in room safety with transfers to bed, toilet, wheelchair, shower as well as bathroom activities and hygiene. Patient/caregiver education for:   [x] Disease/sustained injury/management      [x] Medication Use   [x] Surgical intervention   [x] Safety/Precautions   [] Body mechanics and or joint protection   [x] Health maintenance         PHYSICAL THERAPY:  Goals:                  Short term goals  Time Frame for Short term goals: 7 Days  Short term goal 1: Patient to perform rolling L<>R I  Short term goal 2: Patient to perform supine<>Sit I  Short term goal 3: Patient to pickup object off ground I with 2ww  Short term goal 4: Patient to sit<>Stand I with 2ww  Short term goal 5: Patient to ambulate 10ft uneven surfaces I 2ww            Long term goals  Time Frame for Long term goals : 14 Days  Long term goal 1: Patient to ambulate >350ft even surfaces 2ww with 2 turns  Long term goal 2: Patient to perform WC mobility I >150ft I  Long term goal 3: Patient to ascend/descend 4 steps SBA with B rails  These goals were reviewed with this patient at the time of assessment and Kwesi Gudino is in agreement. Plan of Care: Pt to be seen 5 days per week for a minimum of 60 minutes for 12 days.                 Current Treatment Recommendations: Strengthening, Balance Training, Transfer Training, IADL Training, Endurance Training, Gait Training, Neuromuscular Re-education, Safety Education & Training, Stair training, Wheelchair Mobility Training, ADL/Self-care Training, Functional Mobility Training community reintegration,animal assisted therapy, and concurrent/group therapy. OCCUPATIONAL THERAPY:  Goals:               :  Long term goals  Time Frame for Long term goals : 10-14 days  Long term goal 1: Pt will complete grooming tasks Ind (standing at sink). Long term goal 2: Pt will complete total bathing Ind c AE PRN (LHS). Long term goal 3: Pt will complete UE dressing Ind. Long term goal 4: Pt will complete LE dressing Ind c AE PRN (reacher). Long term goal 5: Pt will don/doff footwear Ind c AE PRN (sock aid, reacher). Long term goals 6: Pt will complete toileting Ind. Long term goal 7: Pt will tolerate at least 10 minutes of light static ther-ex/ther-ax for ADL/IADL completion. :    These goals were reviewed with this patient at the time of assessment and Sofía Ball is in agreement    Plan of Care:  Pt to be seen 5 days per week for a minimum of 60 minutes for 10-14 days. Times per week: 5x   Plan  Times per week: 5x  Times per day: Daily  Plan weeks: 10-14 days  Current Treatment Recommendations: Strengthening, Functional Mobility Training, Positioning, ROM, Pain Management, Equipment Evaluation, Education, & procurement, Balance Training, Safety Education & Training, Self-Care / ADL, Endurance Training         cognitive training, home management, energy conservation training, community reintegration, splint fabrication, patient/caregiver education and training, animal assisted therapy, and concurrent and/or group therapy. SPEECH THERAPY: (If ordered)  Plan of Care and Goals:   LTG                                                      LTG:                           Treatments may include speech/language/communication therapy, cognitive training, animal assisted therapy, group therapy, education, and/or dysphagia therapy based on the above goals. Co-treats where appropriate with PT or OT to facilitate patient goals in functional tasks.         These goals were reviewed with this patient at the RLE)  CARE Score: 4  Discharge Goal: Independent  Wheel 150 Feet  Comment: Patient wheeled 77 ft cga for verbal ceus to use BU and RLE  Discharge Goal: Independent          OT IRF-MONTSE scores and goals for initial assessment:    Eating  Assistance Needed: Independent  CARE Score: 6  Discharge Goal: Independent  Oral Hygiene  Assistance Needed: Supervision or touching assistance  Comment: Steady assist for balance, pt felt weak so completed tasks seate din wc for safety. CARE Score: 4  Discharge Goal: Independent  Toileting Hygiene  Assistance Needed: Partial/moderate assistance  Comment: Britt for virgie care   CARE Score: 3  Discharge Goal: Independent  Shower/Bathe Self  Assistance Needed: Partial/moderate assistance  Comment: Britt for buttocks; SBA while standing d/t decrased balance  CARE Score: 3  Discharge Goal: Independent  Upper Body Dressing  Assistance Needed: Partial/moderate assistance  Comment: Britt to pull around trunk on R side  CARE Score: 3  Discharge Goal: Independent  Lower Body Dressing  Assistance Needed: Partial/moderate assistance  Comment: ModA to thread LLE and fully don pants d/t decraaed RUE ROM to hike pants  CARE Score: 3  Discharge Goal: Independent  Putting On/Taking Off Footwear  Assistance Needed: Partial/moderate assistance  Comment: ModA; pt able to doff R sock, however needed assist to don sock. CARE Score: 3  Discharge Goal: Independent    Activities Prior to Admit:   Homemaking Responsibilities: Yes  Active : No     Occupation: Retired  Leisure & Hobbies: Enjoys being outside, working on her yard, going out with friends         Intensity of Therapy  Peggi Drew will be seen a minimum of 3 hours of therapy per day/a minimum of 5 out of 7 days per week. [] In this rare instance due to the nature of this patient's medical involvement, this patient will be seen 15 hours per week (900 minutes within a 7 day period).     Treatments may include therapeutic exercises, gait training, neuromuscular re-ed, transfer training, community reintegration, bed mobility, w/c mobility and training, self care, home mgmt, cognitive training, energy conservation,dysphagia tx, speech/language/communication therapy, group therapy, and patient/family education. In addition, dietician/nutritionist may monitor calorie count as well as intake and collaboratively work with SLP on dietary upgrades. Neuropsychology/Psychology may evaluate and provide necessary support. Group therapy as appropriate to facilitate improved endurance, STR, COORD, function, safety, transfers, awareness and insight into deficits, problem solving, memory, and social interaction and engagement. Medical issues being managed closely and that require 24 hour availability of a physician:   [] Swallowing Precautions                                     [x] Weight bearing precautions   [x] Wound Care                             [x] Infection Prevention   [x] DVT Prophylaxis/assessment              [x] Monitoring for complications    [x] Fall Precautions/Prevention                         [x] Fluid/Electrolyte/Nutrition Balance   [] Voice Protection                           [x] Medication Management   [x] Respiratory                   [x] Pain Mgmt   [x] Bowel/Bladder Fx    Medical Prognosis: [] Good  [x] Fair    [] Guarded   Total expected IRF days 12                                            Physician anticipated functional outcomes:  FWW and HHC OT/PT/RN and supervision.   Rehab Goals:   [] Return to premorbid function of_______________________________.    [] Independent   [] Mod I  [x] Supervision  [] CGA   [] Min A   [] Mod A  Level for ambulation []without assistive device  [x] with assistive device        [] Independent   [] Mod I  [x] Supervision [] CGA   [] Min A   [] Mod A  Level for transfers []without assistive device  [x] with assistive device         [] Independent   [] Mod I  [x] Supervision [] CGA   [] Min A   [] Mod A her full anticoagulation was made between the hospitalist, the patient and her daughter on the acute medical floor. Anticipated discharge destination:    [] Home Independently   [x] Home with supervision    []SNF     [] Other       This plan has been reviewed with me in a language I understand.  I have had the opportunity to include my input with my therapy team.    ________________________________________________   ______________________  Patient/Significant Other      Date    I have reviewed this initial plan of care and agree with its contents:    Title   Name    Date    Time    Physician: Olvin March 6/2/2020 11:36 AM    Case Mgmt:  Noé Steve 5/31/2020 1943    OT: Ivan Wade M.S., OTR/L   05/31/2020   14:51    PT:  Vera Mortimer, LPT 5/31/2020 1257    RN: Mahin Nava RN 5/31/2020 1243    ST:    Dietician:

## 2020-05-31 NOTE — H&P
HGB 9.9 (L) 05/31/2020    HCT 33.9 (L) 05/31/2020    MCV 90.6 05/31/2020     05/31/2020     Lab Results   Component Value Date    INR 1.71 05/27/2020    INR 2.16 02/18/2020    INR 3.03 02/14/2020    PROTIME 20.8 (H) 05/27/2020    PROTIME 26.4 (H) 02/18/2020    PROTIME 37.1 (H) 02/14/2020     Lab Results   Component Value Date    CREATININE 0.8 05/31/2020    BUN 10 05/31/2020     05/31/2020    K 4.3 05/31/2020     05/31/2020    CO2 23 05/31/2020     Lab Results   Component Value Date    ALT 17 05/27/2020    AST 42 (H) 05/27/2020    ALKPHOS 89 05/27/2020    BILITOT 0.4 05/27/2020         Impression: 80-year-old female struggling with recurring falls, lower limb deformities with Charcot joints and ischemic ulcers and history of hypertension. Strengths for the patient: Supportive family, recently accessible home and some experience with adaptive equipment. Limitations/barriers for the patient: Her age, that she lives alone and her apparent unwillingness to embrace using a wheelchair in the home. Recommendation: Acute inpatient rehabilitation with occupational and physical therapy 180 minutes 5 out of every 7 days. Will address basic and  advancing mobility with self-care instruction and adaptive equipment training. Caregiver education will be offered. Expected length of stay prior to a supervised level of function for discharge home with a wheelchair and [facility] OT/PT is 2 weeks. Additional recommendation:    1. Lower limb cellulitis with gait disturbance: The patient requires daily occupational and physical therapy. We will instruct her on toe-touch weightbearing techniques through the left lower limb. We must emphasize wound care, ongoing antibiotic administration and DVT prophylaxis. Will optimize blood pressure control and encourage nutritional support. Pulmonary hygiene will be emphasized. 2. DVT prophylaxis: Lovenox 40 mg subcu daily.   I must monitor her hemoglobin and platelet

## 2020-06-01 NOTE — PROGRESS NOTES
GENERAL SURGERY PROGRESS NOTE    CC/HPI:           Patient feels better from her surgery last week, awaiting path. Vitals:    05/31/20 0500 05/31/20 0815 05/31/20 1529 06/01/20 0503   BP: (!) 169/71 (!) 167/70 (!) 162/71 (!) 150/67   Pulse: 82 88 89 81   Resp: 18 18 18 16   Temp: 98.8 °F (37.1 °C) 98.6 °F (37 °C) 98.4 °F (36.9 °C) 98.3 °F (36.8 °C)   TempSrc: Oral Oral Oral Oral   SpO2: 91% 94% 96% 93%   Weight:       Height:         I/O last 3 completed shifts: In: 300 [P.O.:300]  Out: -   No intake/output data recorded.     DIET GENERAL;    Recent Results (from the past 48 hour(s))   Urinalysis    Collection Time: 05/30/20 10:03 AM   Result Value Ref Range    Color, UA YELLOW YELLOW    Clarity, UA CLEAR CLEAR    Glucose, Urine NEGATIVE NEGATIVE MG/DL    Bilirubin Urine NEGATIVE NEGATIVE MG/DL    Ketones, Urine NEGATIVE NEGATIVE MG/DL    Specific Gravity, UA 1.008 1.001 - 1.035    Blood, Urine NEGATIVE NEGATIVE    pH, Urine 9.0 (HH) 5.0 - 8.0    Protein, UA NEGATIVE NEGATIVE MG/DL    Urobilinogen, Urine NORMAL 0.2 - 1.0 MG/DL    Nitrite Urine, Quantitative NEGATIVE NEGATIVE    Leukocyte Esterase, Urine NEGATIVE NEGATIVE    RBC, UA 1 0 - 6 /HPF    WBC, UA 1 0 - 5 /HPF    Bacteria, UA NEGATIVE NEGATIVE /HPF    Squam Epithel, UA 1 /HPF    Mucus, UA RARE (A) NEGATIVE HPF    Trichomonas, UA NONE SEEN NONE SEEN /HPF   COVID-19    Collection Time: 05/30/20 11:15 AM   Result Value Ref Range    Source NASOPHARYNGEAL SWAB     SARS-CoV-2, NAAT NOT DETECTED    CBC    Collection Time: 05/31/20  5:24 AM   Result Value Ref Range    WBC 8.8 4.0 - 10.5 K/CU MM    RBC 3.74 (L) 4.2 - 5.4 M/CU MM    Hemoglobin 9.9 (L) 12.5 - 16.0 GM/DL    Hematocrit 33.9 (L) 37 - 47 %    MCV 90.6 78 - 100 FL    MCH 26.5 (L) 27 - 31 PG    MCHC 29.2 (L) 32.0 - 36.0 %    RDW 15.9 (H) 11.7 - 14.9 %    Platelets 788 646 - 223 K/CU MM    MPV 9.6 7.5 - 11.1 FL   Basic metabolic panel    Collection Time: 05/31/20  5:24 AM   Result Value Ref Range    Sodium 143 135 - 145 MMOL/L    Potassium 4.3 3.5 - 5.1 MMOL/L    Chloride 107 99 - 110 mMol/L    CO2 23 21 - 32 MMOL/L    Anion Gap 13 4 - 16    BUN 10 6 - 23 MG/DL    CREATININE 0.8 0.6 - 1.1 MG/DL    Glucose 112 (H) 70 - 99 MG/DL    Calcium 8.7 8.3 - 10.6 MG/DL    GFR Non-African American >60 >60 mL/min/1.73m2    GFR African American >60 >60 mL/min/1.73m2       Scheduled Meds:   amLODIPine  5 mg Oral Daily    pantoprazole  40 mg Oral QAM    pregabalin  150 mg Oral BID    cefepime  1 g Intravenous Q12H    enoxaparin  40 mg Subcutaneous Daily    sennosides-docusate sodium  1 tablet Oral Daily    lactobacillus  1 capsule Oral Daily       Continuous Infusions:      Physical Exam:  HEENT: Anicteric sclerae, Oropharyngeal mucosae moist, pink and intact. Heart:  Normal S1 and S2, RRR  Lungs: Clear to auscultation bilaterally, No audible Wheezes or Rales. Extremities: No edema. Neuro: Alert and Oriented x 3, Non focal.  Abdomen: Soft, Benign, Non tender, Non distended, Positive bowel sounds. Incision: Nicely healing: No erythema, No purulent discharge. Principal Problem:    Cellulitis of left foot  Active Problems: Morbid obesity due to excess calories (HCC)    Uncontrolled pain    Generalized weakness    Gait disturbance    Charcot's joint of left foot    Ischemic ulcer of left heel with fat layer exposed ([address]  Resolved Problems:    * No resolved hospital problems. *      Assessment and Plan:  Rebecca Connor is a 80 y.o. female who is POD # 3 status post:  1) Incision and Drainage of the 6 cm x 7 cm un-loculated infected necrotic left foot infected ulcer, Gram stain/C&S, aerobic and anaerobic cultures sent; Pulsed irrigation with 3 L of diluted betadine, packing the wound with 1\" Iodoform. 2) Excisional biopsy of the surrounding wound \"tissue\" / \"mass\" to pathology. .    Awaiting Pathology; continue Abx, and local wound care.     Increase Ambulation to at least 4x/day walk in the hallways with assistance. Respiratory virgie-operative care: Incentive Spirometry / deep breathing and coughing 10x/hr while awake. Continue DVT prophylaxis with Teds and SCDs and SC Lovenox. Continue GI prophylaxis with Protonix IV till able to tolerate PO.   Continue virgie-op Abx.    ___________________________________________    Rina Alexis MD, FACS, FICS  6/1/2020  7:38 AM

## 2020-06-01 NOTE — PROGRESS NOTES
MMM.  Neck supple. Speech clear. Pulmonary: Clear and unlabored. Cardiac: Regular rate and rhythm. Abdomen: Patient's abdomen is soft and nondistended. Bowel sounds were present throughout. There was no rebound, guarding or masses noted. Upper extremities: Trouble raising her right arm at the shoulder. Passively she can get it up and then she can hold it. Resisted external rotation is well-maintained. Tenderness over the subdeltoid bursa. Lower extremities: Left foot dressed. Some drainage noted on the dressing. Calf soft. Sitting balance was good. Standing balance was poor. Lab Results   Component Value Date    WBC 8.8 05/31/2020    HGB 9.9 (L) 05/31/2020    HCT 33.9 (L) 05/31/2020    MCV 90.6 05/31/2020     05/31/2020     Lab Results   Component Value Date    INR 1.71 05/27/2020    INR 2.16 02/18/2020    INR 3.03 02/14/2020    PROTIME 20.8 (H) 05/27/2020    PROTIME 26.4 (H) 02/18/2020    PROTIME 37.1 (H) 02/14/2020     Lab Results   Component Value Date    CREATININE 0.8 05/31/2020    BUN 10 05/31/2020     05/31/2020    K 4.3 05/31/2020     05/31/2020    CO2 23 05/31/2020     Lab Results   Component Value Date    ALT 17 05/27/2020    AST 42 (H) 05/27/2020    ALKPHOS 89 05/27/2020    BILITOT 0.4 05/27/2020       Expected length of stay  prior to a supervised level of function for discharge home with a walker and [facility] OT/PT is 2 weeks. Recommendations:    1. Lower limb cellulitis with gait disturbance: Developing the routine for her daily occupational and physical therapy. Instructing her on toe-touch weightbearing techniques through the left lower limb. I believe she would benefit from a rigid soled postop shoe. Emphasizing wound care, ongoing antibiotic administration and DVT prophylaxis. Will optimize blood pressure control and encourage nutritional support. Pulmonary hygiene  emphasized. 2. DVT prophylaxis: Lovenox 40 mg subcu daily.   I must monitor her hemoglobin and platelet count periodically while on this medication. Weightbearing activities are pursued daily. GI prophylaxis offered. No signs of acute blood loss. 3. Charcot foot with left heel ulcer: Patient has 4 more days of IV cefepime. Local wound care. Consult with the wound care team for further direction. Touchdown weightbearing through the toes. Will offer rigid soled shoe. 4. Hypertension: Patient requires Norvasc for blood pressure regulation. Vital signs are checked at rest and with activity. Consistent oral intake is encouraged. Target systolic blood pressure is 120-140  5. History of DVT: Prophylactic anticoagulation for DVT is reasonable but full anticoagulation due to her recurring falls seems unsafe. A decision to stop her full anticoagulation was made between the hospitalist, the patient and her daughter on the acute medical floor.   No new lower limb swelling.

## 2020-06-01 NOTE — PROGRESS NOTES
Physical Therapy    [x] daily progress note       [] discharge       Patient Name:  Sergio Magana   :  1931 MRN: [id number]  Room:  [address] [place] [address] Date of Admission: 2020  Rehabilitation Diagnosis:   Cellulitis of left foot [L03.116]       Date 2020       Day of ARU Week:  3   Time IN//943   Individual Tx Minutes 70   Group Tx Minutes    Co-Treat Minutes    Concurrent Tx Minutes    TOTAL Tx Time Mins 70   Variance Time +10   Variance Time []   Refusal due to:     []   Medical hold/reason:    []   Illness   []   Off Unit for test/procedure  [x]   Extra time needed to complete task  []   Therapeutic need  []   Other (specify):   Restrictions Restrictions/Precautions  Restrictions/Precautions: Fall Risk, Up as Tolerated, General Precautions, Weight Bearing      Interdisciplinary communication [x]   Cleared for therapy per nursing     [x]   RN notified about issues during session (diarrhea)   []   RN updated on pt performance  []   Spoke with   []   Spoke with OT  []   Spoke with MD  []   Other:    Subjective observations and cognitive status: Pt alert, states decreased sleep last night, cooperative. \"I haven't been home for 4 months. \" Pt is uncertain whether her disposable brief needs changed or not while she was in bed.       Pain level/location:   7 /10       Location: R shoulder   Discharge recommendations  Anticipated discharge date:  TBD  Destination: []home alone   []home alone with assist PRN     [] home w/ family      [] Continuous supervision  []SNF    [] Assisted living     [] Other:  Continued therapy: [x]HHC PT  []OUTPATIENT  PT   [] No Further PT  []SNF PT  Caregiver training recommended: []Yes  [] No   Equipment needs: has RW and rollator      Bed Mobility:           []   Pt received out of bed   Rolling R/L:  SBA  Lying --> Sit:  SBA  Sit --> lying:  Min A (required assist on RLE)   Bed features used: [] Yes  [x] No     Transfers:    Sit--> Stand:  CGA  Stand --> Sit:  CGA

## 2020-06-01 NOTE — CONSULTS
Assisted Laparoscopic Sacrocolpopexy    OTHER SURGICAL HISTORY Right 09/04/2018    orif right patella    OVARIAN CYST SURGERY Right 1957    \"Right\"    SKIN CANCER EXCISION  2000's    Skin Cancer Excised Back Of Right Leg    TOE AMPUTATION Right 1990's Or 2000's    Right Foot Little Toe    TOE AMPUTATION Right 5-23-13    Second Toe    TONSILLECTOMY  1962    TOTAL HIP ARTHROPLASTY Right 08/21/2016       FAMILY HISTORY    Family History   Problem Relation Age of Onset    Kidney Disease Mother     Heart Disease Mother         Heart Attack     Early Death Father 44        \"Muster Gas\"    Heart Disease Sister         Heart Attack    Early Death Brother 46    Diabetes Brother     Early Death Sister 2    Cancer Brother         Prostate Cancer    Kidney Disease Brother     Diabetes Brother     Diabetes Brother     Heart Disease Brother     Diabetes Son        SOCIAL HISTORY    Social History     Tobacco Use    Smoking status: Never Smoker    Smokeless tobacco: Never Used   Substance Use Topics    Alcohol use: No    Drug use: No       ALLERGIES    Allergies   Allergen Reactions    Percocet [Oxycodone-Acetaminophen]      \"I Get Got Crazy And Mean\"    Phenergan [Promethazine Hcl]      \"Violent Behavior\"      Tape [Adhesive Tape]      \"Redness And Tears The Skin , Paper Tape Is Ok To Use\"       MEDICATIONS    No current facility-administered medications on file prior to encounter. Current Outpatient Medications on File Prior to Encounter   Medication Sig Dispense Refill    pregabalin (LYRICA) 150 MG capsule Take 1 capsule by mouth 2 times daily.  60 capsule 0    lidocaine 4 % external patch Place 1 patch onto the skin daily 7 patch 0    docusate sodium (COLACE, DULCOLAX) 100 MG CAPS Take 100 mg by mouth 2 times daily 30 capsule 0    amLODIPine (NORVASC) 10 MG tablet Take 1 tablet by mouth daily 30 tablet 3    acetaminophen (TYLENOL) 500 MG tablet Take 2 tablets by mouth every 6 hours as needed 6/1/2020  9:50 AM   Wound Cleansed Rinsed/Irrigated with saline 6/1/2020  9:50 AM   Wound Length (cm) 0.5 cm 6/1/2020  9:50 AM   Wound Width (cm) 0.9 cm 6/1/2020  9:50 AM   Wound Depth (cm) 1.6 cm 6/1/2020  9:50 AM   Wound Surface Area (cm^2) 0.45 cm^2 6/1/2020  9:50 AM   Wound Volume (cm^3) 0.72 cm^3 6/1/2020  9:50 AM   Distance Tunneling (cm) 0 cm 6/1/2020  9:50 AM   Tunneling Position ___ O'Clock 0 6/1/2020  9:50 AM   Undermining Starts ___ O'Clock 0 6/1/2020  9:50 AM   Undermining Ends___ O'Clock 0 6/1/2020  9:50 AM   Undermining Maxium Distance (cm) 0 6/1/2020  9:50 AM   Wound Assessment Yellow;Red 6/1/2020  9:50 AM   Drainage Amount Moderate 6/1/2020  9:50 AM   Drainage Description Serosanguinous; Yellow 6/1/2020  9:50 AM   Odor None 6/1/2020  9:50 AM   Margins Defined edges 6/1/2020  9:50 AM   Emily-wound Assessment Dry 6/1/2020  9:50 AM   Non-staged Wound Description Full thickness 6/1/2020  9:50 AM   Red%Wound Bed 90 6/1/2020  9:50 AM   Yellow%Wound Bed 10 6/1/2020  9:50 AM   Number of days: 0       Response to treatment:  Well tolerated by patient. Pain Assessment:  Severity:  none  Quality of pain: na  Wound Pain Timing/Severity: na  Premedicated: no    Plan:     Plan of Care: Incision 05/29/20 Foot Left;Posterior;Plantar-Dressing/Treatment: Gauze dressing/ dressing sponge, Dry dressing  Wound 06/01/20 Foot Left;Plantar-Dressing/Treatment: (1\" iodoform, abd, kerlix)  Wound 06/01/20 Foot Left;Lateral;Plantar-Dressing/Treatment: (1\" iodoform, abd, kerlix)     Pt in bed. Agreeable to wound assessment. Wounds to left foot from I/D per Dr. Jaydon Arias on 5/29/20. Dressing change orders per Dr. Jaydon Arias already placed. Picture and measurements taken. Charcot's foot deformity noted. Pt stated has seen Dr. Марина Stallings as podiatrist as well. Treatment per Dr. Mercedes Lieberman orders. Pt is at mild risk for skin breakdown AEB kyle. Follow Kyle orders. Atmos air pump applied to mattress. Bilateral heels intact.      Specialty Bed Required : yes  [] Low Air Loss   [x] Pressure Redistribution  [] Fluid Immersion  [] Bariatric  [] Total Pressure Relief  [] Other:     Discharge Plan:  Placement for patient upon discharge: tbd  Hospice Care: no  Patient appropriate for Outpatient [address]: follow up with Dr. Gali Maddox or Dr. Memo Casper    Patient/Caregiver Teaching:  Level of patient/caregiver understanding able to:   Needs reinforcement.         Electronically signed by Anastasia Garcia RN,  on 6/1/2020 at 3:39 PM

## 2020-06-01 NOTE — PROGRESS NOTES
(Update in navigator)   : Long term goals  Time Frame for Long term goals : 10-14 days  Long term goal 1: Pt will complete grooming tasks Ind (standing at sink). Long term goal 2: Pt will complete total bathing Ind c AE PRN (LHS). Long term goal 3: Pt will complete UE dressing Ind. Long term goal 4: Pt will complete LE dressing Ind c AE PRN (reacher). Long term goal 5: Pt will don/doff footwear Ind c AE PRN (sock aid, reacher). Long term goals 6: Pt will complete toileting Ind. Long term goal 7: Pt will tolerate at least 10 minutes of light static ther-ex/ther-ax for ADL/IADL completion.:        Plan of Care                                                                              Times per week: 5 days per week for a minimum of 60 minutes/day plus group as appropriate for 60 minutes.   Treatment to include Plan  Times per week: 5x  Times per day: Daily  Plan weeks: 10-14 days  Current Treatment Recommendations: Strengthening, Functional Mobility Training, Positioning, ROM, Pain Management, Equipment Evaluation, Education, & procurement, Balance Training, Safety Education & Training, Self-Care / ADL, Endurance Training    Electronically signed by   Geeta Fitch, [address], OTR/L  License # [id number]    6/1/2020, 4:17 PM

## 2020-06-01 NOTE — PROGRESS NOTES
Patient's Precautions   []   Progress was updated and reviewed in Rehabtracker with patient and/or family this         date. Treatment Plan for Next Session: Cont POC to work     Assessment:  Pt demo good tolerance of session. Barriers at this time include decreased strength, decreased ROM, decreased cognition . Pt demo potential to return to baseline function with cont POC.          Treatment/Activity Tolerance:   [x] Tolerated treatment with no adverse effects    [] Patient limited by fatigue  [] Patient limited by pain   [] Patient limited by medical complications:    [] Adverse reaction to Tx:   [] Significant change in status    Safety:       []  bed alarm set    []  chair alarm set    []  Pt refused alarms                []  Telesitter activated      [x]  Gait belt used during tx session      []other:       Number of Minutes/Billable Intervention  Therapeutic Exercise    ADL Self-care 30   Neuro Re-Ed    Therapeutic Activity 30   Group    Other:    TOTAL 60       Social History  Social/Functional History  Lives With: Alone  Type of Home: House  Home Layout: One level  Home Access: Level entry  Bathroom Shower/Tub: Walk-in shower, Shower chair with back  Bathroom Toilet: Handicap height  Bathroom Equipment: 3-in-1 commode  Bathroom Accessibility: Accessible  Home Equipment: Rolling walker, 4 wheeled walker, Reacher  Receives Help From: Family(Daughter grocery shops for her)  ADL Assistance: Independent  Homemaking Assistance: Independent  Homemaking Responsibilities: Yes  Meal Prep Responsibility: Primary  Laundry Responsibility: Primary  Cleaning Responsibility: Primary  Bill Paying/Finance Responsibility: Primary  Ambulation Assistance: Independent  Transfer Assistance: Independent  Active : No  Occupation: Retired  Type of occupation: Patient worked at [employer] in the [place].: Enjoys being outside, working on her yard, going out with friends  Additional Comments: Pt reports 4 fall within the last week, unsure why     Objective                                                                                    Goals:  (Update in navigator)   : Long term goals  Time Frame for Long term goals : 10-14 days  Long term goal 1: Pt will complete grooming tasks Ind (standing at sink). Long term goal 2: Pt will complete total bathing Ind c AE PRN (LHS). Long term goal 3: Pt will complete UE dressing Ind. Long term goal 4: Pt will complete LE dressing Ind c AE PRN (reacher). Long term goal 5: Pt will don/doff footwear Ind c AE PRN (sock aid, reacher). Long term goals 6: Pt will complete toileting Ind. Long term goal 7: Pt will tolerate at least 10 minutes of light static ther-ex/ther-ax for ADL/IADL completion.:        Plan of Care                                                                              Times per week: 5 days per week for a minimum of 60 minutes/day plus group as appropriate for 60 minutes.   Treatment to include Plan  Times per week: 5x  Times per day: Daily  Plan weeks: 10-14 days  Current Treatment Recommendations: Strengthening, Functional Mobility Training, Positioning, ROM, Pain Management, Equipment Evaluation, Education, & procurement, Balance Training, Safety Education & Training, Self-Care / ADL, Endurance Training    Electronically signed by   Dano Lamb M.S., OTR/L  6/1/2020, 11:04 AM

## 2020-06-02 NOTE — PROGRESS NOTES
functional activities. Assessment:   Pt demo good tolerance of session. Barriers at this time include decreased strength, decreased endurance, decreased activity tolerance, decreased safety awareness. Pt demo potential to improve with ADLs and functional activity with POC.          Treatment/Activity Tolerance:   [x] Tolerated treatment with no adverse effects    [] Patient limited by fatigue  [] Patient limited by pain   [] Patient limited by medical complications:    [] Adverse reaction to Tx:   [] Significant change in status    Safety:       []  bed alarm set    [x]  chair alarm set    []  Pt refused alarms                []  Telesitter activated      [x]  Gait belt used during tx session      []other:       Number of Minutes/Billable Intervention  Therapeutic Exercise    ADL Self-care 60   Neuro Re-Ed    Therapeutic Activity    Group    Other:    TOTAL 60       Social History  Social/Functional History  Lives With: Alone  Type of Home: House  Home Layout: One level  Home Access: Level entry  Bathroom Shower/Tub: Walk-in shower, Shower chair with back  Bathroom Toilet: Handicap height  Bathroom Equipment: 3-in-1 commode  Bathroom Accessibility: Accessible  Home Equipment: Rolling walker, 4 wheeled walker, Reacher  Receives Help From: Family(Daughter grocery shops for her)  ADL Assistance: Independent  Homemaking Assistance: Independent  Homemaking Responsibilities: Yes  Meal Prep Responsibility: Primary  Laundry Responsibility: Primary  Cleaning Responsibility: Primary  Bill Paying/Finance Responsibility: Primary  Ambulation Assistance: Independent  Transfer Assistance: Independent  Active : No  Occupation: Retired  Type of occupation: Patient worked at [employer] in [place] [employer] Enjoys being outside, working on her yard, going out with friends  Additional Comments: Pt reports 4 fall within the last week, unsure why     Objective

## 2020-06-02 NOTE — CONSULTS
injury ([facility] [address] 02/02/2020    Frequent falls 02/02/2020    Morbid obesity due to excess calories ([facility] [address] 02/02/2020    Fall at home, subsequent encounter 02/02/2020    Left skew foot deformity 01/04/2020    Postural dizziness with near syncope 01/04/2020    Fall at home, sequela 01/04/2020    Closed displaced fracture of right patella 09/02/2018    Fall at home 09/02/2018    Lymphedema of both lower extremities 06/23/2017    Lower leg edema 08/24/2016    Blister of foot, left 08/24/2016    Varicose veins of leg with swelling 08/13/2016    Venous stasis dermatitis of left lower extremity 08/13/2016    Chronic venous hypertension 08/13/2016    Vaginal vault prolapse after hysterectomy 11/23/2015    Closed fracture of right hip ([facility] [address] 07/07/2012    HTN (hypertension) 07/07/2012     Past Medical History:   Diagnosis Date    Acid reflux     Anesthesia     \"Combative After Anesthesia\"    Arthritis     \"All Over My Body\"    Diverticulosis     HTN (hypertension)     Hx of blood clots 2000's    \"Behind Right Knee\"    Kidney stones     Passed Kidney Stones In 1970's    Lower back pain     \"Sometimes\"    Neuropathy     bilateral feet    Osteoporosis     IV Reclast Once A Year, Last Dose Received In 2014    Purpura ([facility] [address]     \"Arms\"    Shingles     \"Twice, First Time  In 1972 Left Breast Area, Second Time In 2000 Right Lower Abdomen\"    Skin Cancer Excised Back Of Right Leg 2000's    Teeth missing     Upper And Lower    Urinary incontinence     Wears dentures     Full Upper    Wears glasses       Past Surgical History:   Procedure Laterality Date    APPENDECTOMY  1941    BLADDER SUSPENSION  1990's    BREAST CYST EXCISION Right 1955    Benign    COLONOSCOPY  Last Done In 2000's    Polpys Removed In Past    DENTAL SURGERY      Teeth Extracted In Past    DILATION AND CURETTAGE OF UTERUS      \"2 Or 3 \" Prior To DUANE In 1970's    FOOT DEBRIDEMENT Left 5/29/2020    FOOT ABSCESS DEBRIDEMENT INCISION AND DRAINAGE LEFT, EXCISIONAL BIOPSY OF THE LEFT FOOT MASS performed by Sherly Lennon MD at [address] Left 7-7-12    Broken Left Femur With Hardware    HYSTERECTOMY, TOTAL ABDOMINAL  1970's    JOINT REPLACEMENT Right 12-2-14    Total Right Knee    OTHER SURGICAL HISTORY  11/23/15    Robotic Assisted Laparoscopic Sacrocolpopexy    OTHER SURGICAL HISTORY Right 09/04/2018    orif right patella    OVARIAN CYST SURGERY Right 1957    \"Right\"    SKIN CANCER EXCISION  2000's    Skin Cancer Excised Back Of Right Leg    TOE AMPUTATION Right 1990's Or 2000's    Right Foot Little Toe    TOE AMPUTATION Right 5-23-13    Second Toe    TONSILLECTOMY  1962    TOTAL HIP ARTHROPLASTY Right 08/21/2016      Family History   Problem Relation Age of Onset    Kidney Disease Mother     Heart Disease Mother         Heart Attack     Early Death Father 44        \"Muster Gas\"   Avelar Heart Disease Sister         Heart Attack    Early Death Brother 46    Diabetes Brother     Early Death Sister 2    Cancer Brother         Prostate Cancer    Kidney Disease Brother     Diabetes Brother     Diabetes Brother     Heart Disease Brother     Diabetes Son       Infectious disease related family history - not contibutory. SOCIAL HISTORY  Social History     Tobacco Use    Smoking status: Never Smoker    Smokeless tobacco: Never Used   Substance Use Topics    Alcohol use: No        ?  ALLERGIES  Allergies   Allergen Reactions    Percocet [Oxycodone-Acetaminophen]      \"I Get Got Crazy And Mean\"    Phenergan [Promethazine Hcl]      \"Violent Behavior\"      Tape [Adhesive Tape]      \"Redness And Tears The Skin , Paper Tape Is Ok To Use\"      MEDICATIONS  Reviewed and are per the chart/EMR. ?   Antibiotics:   Present:  Zosyn 6/2-    Past:  Zosyn 5/29  Vancomycin 5/28  Cefepime 5/28, 30-6/2  -------------------------------------------------------------------------------------------------------------------    Vital 3. No significant marrow edema adjacent to the soft tissue ulceration which   most closely approaches the cuboid. ??  I have examined this patient and available medical records on this date and have made the above observations, conclusions and recommendations. Electronically signed by: Electronically signed by Dylan Hedrick.  CHUCK Mcnulty CNP on 6/2/2020 at 9:02 AM

## 2020-06-02 NOTE — PROGRESS NOTES
Seated ther ex (reps/sets):     []   Standing ther ex (reps/sets):     []   Picking up object from floor (standing):                   []   Reacher used   [x]   Other: Toileting activity completed with Min A    [x]   Other: Vital signs assessment as pt presented with lightheadedness during toileting activity: VK=982/59, HR=93, SpO2 in room air=98%    Comments:      Patient/Caregiver Education and Training:   []   Role of PT  []   Education about Dx  []   Use of call light for assist   []   HEP provided and explained   [x]   Treatment plan reviewed  []   Home safety  []   Wheelchair mobility/management   []   Body mechanics  []   Bed Mobility/Transfer technique  []   Gait technique/sequencing  []   Proper use of assistive device/adaptive equipment  [x]   Stair training/Advanced mobility safety and technique  []   Reinforced patient's precautions/mobility while maintaining precautions  []   Postural awareness  []   Family/caregiver training  [x]   Progress was updated and reviewed in Rehabtracker with patient and/or family this date. []   Other:    Treatment Plan for Next Session: gait and transfers training      Assessment: Pt was unable to fully comply with TTWB on LLE due to physical limitations related to chronic balance and RLE deficits and acute R shoulder pain. Pt, however, was able to tolerate use of post-op shoe on L with all OOB activities today. Pt's activity tolerance was limited mostly by fatigue and R shoulder pain today but not by L foot pain.           Treatment/Activity Tolerance:   [] Tolerated treatment with no adverse effects    [x] Patient limited by fatigue  [x] Patient limited by pain   [] Patient limited by medical complications:    [] Adverse reaction to Tx:   [] Significant change in status    Barrier/s to progress/learning:   []   None  []   Cognition  []   Hearing deficit  []   Pre-morbid mental/psychological status   []   Motivation  []   Communication  []   Anxiety  []   Vision retrieval from the floor in standing using reacher Mod Ind. Short term goal 5: Pt will consistently propel manual w/c >/=200 ft Mod Ind. :  Long term goals  Time Frame for Long term goals : 10-12 tx days:   Long term goal 1: Pt will ambulate >/=150 ft using RW and L post-op shoe Mod Ind. Long term goal 2: Pt will ascend/descend 4-5 steps using bilat rails and L post-op shoe Mod Ind. Long term goal 3: Pt will ascend/descend curb step and ambulate over uneven surfaces using RW and L post-op shoe Mod Ind. :        Plan of Care                                                                              Times per week: 5 days per week for a minimum of 60 minutes/day plus group as appropriate for 60 minutes.   Treatment to include Current Treatment Recommendations: Strengthening, Balance Training, Transfer Training, Endurance Training, Gait Training, Safety Education & Training, Stair training, Wheelchair Mobility Training, Functional Mobility Training, Home Exercise Program, Patient/Caregiver Education & Training, ROM, Equipment Evaluation, Education, & procurement, Pain Management    Electronically signed by   Des Villanueva PT   6/2/2020, 4:14 PM

## 2020-06-03 NOTE — PATIENT CARE CONFERENCE
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 SUP->SIT IND; SIT->SUP AND ROLLING SBA   Short term goal 2: Pt will complete OOB transfers using RW and L post-op shoe Mod Ind. PROGRESSING (SIT<->STAND SBA; CAR TX CGA )  Short term goal 3: Pt will ambulate 150 ft using RW and L post-op shoe with Sup. MET (202 FT, RW, SBA; AVERAGE 100 FT SBA)   Short term goal 4: Pt will complete object retrieval from the floor in standing using reacher Mod Ind. CGA   Short term goal 5: Pt will consistently propel manual w/c >/=200 ft Mod Ind. MOD IND  Long term goals  Time Frame for Long term goals : 10-12 tx days:   Long term goal 1: Pt will ambulate >/=150 ft using RW and L post-op shoe Mod Ind. Long term goal 2: Pt will ascend/descend 4-5 steps using bilat rails and L post-op shoe Mod Ind. CGA   Long term goal 3: Pt will ascend/descend curb step and ambulate over uneven surfaces using RW and L post-op shoe Mod Ind.    MIN A  Impairments/deficits, barriers:  R shoulder pain, status of L foot wound   Body structures, Functions, Activity limitations: Decreased functional mobility , Decreased safe standing d/t decrased balance  CARE Score: 3  Discharge Goal: Independent  Upper Body Dressing  Assistance Needed: Partial/moderate assistance  Comment: Britt to pull around trunk on R side  CARE Score: 3  Discharge Goal: Independent  Lower Body Dressing  Assistance Needed: Partial/moderate assistance  Comment: ModA to thread LLE and fully don pants d/t decraaed RUE ROM to hike pants  CARE Score: 3  Discharge Goal: Independent  Putting On/Taking Off Footwear  Assistance Needed: Partial/moderate assistance  Comment: ModA; pt able to doff R sock, however needed assist to don sock.   CARE Score: 3  Discharge Goal: Independent      Impairments/deficits, barriers:    Assessment  Performance deficits / Impairments: Decreased functional mobility , Decreased strength, Decreased endurance, Decreased ADL status, Decreased coordination, Decreased ROM, Decreased balance  Prognosis: Good  Decision Making: Low Complexity  REQUIRES OT FOLLOW UP: Yes  Discharge Recommendations: Home independently, Home with Home health OT   Barriers: difficulty following TTWB precautions  Equipment needed at discharge: none    COGNITIVE FUNCTION/SPEECH THERAPY (AS INDICATED)  LTG          Nursing Current Medical Status:   [x] Is continent of bowel and bladder     [] Is incontinent of bowel and bladder    [x] Has had an adequate number of bowel movements   [] Urinates with no urinary retention >300ml in bladder   [] Targeting bladder protocol with suggs removal   [x] Maintaining O2 SATs at 92% or greater   [x] Has pain managed while on ARU         [x] Has had no skin breakdown while on ARU   [x] Has improved skin integrity via wound measurements   [x] Has no signs/symptoms of infection at the wound site    [x] Wounds Stage/Location: I/D wounds L foot                             [x] Arrived on unit with wound  [x] Has been free from injury during hospitalization   [] Has experienced a fall during hospitalization  [] Ongoing education with towards their long term goals and is actively participating in and has a reasonable expectation to continue to benefit from the intensive rehabilitation therapy program   []  The estimated discharge date has been changed from initial team conference due to:   []  The estimated discharge destination has been changed from initial team conference due to:         Ongoing tx following discharge: [x]HHC PT/OT/NSG     []OUTPATIENT     [] No Further Treatment     [] Family/Caregiver Training  []  Transitional Living Arrangement   [] Home Assessment (date  )     [] Family Conference   []  Therapeutic Pass       []  Other: (specify)    Estimated Discharge Date: 6/11/2020    Estimated Discharge Destination: []home alone   []home alone with assist prn  []Continuous supervision []Return home with spouse/family   [x] Assisted living vs  [x]SNF     See team signature page for team members participating in today's conference. Goals have been updated to reflect recent status.       I have led this Team Conference and agree with the plan, Chuy Stewart MD, 6/4/2020, 12:33 PM    Team conference note transcribed this date by: Brittanie Espinoza, [id number] [facility], [place], Therapy Coordinator

## 2020-06-03 NOTE — PROGRESS NOTES
dizziness with near syncope    Fall at home, sequela    Acute kidney injury ([address]    Frequent falls    Morbid obesity due to excess calories ([address]    Fall at home, subsequent encounter    Fall (on)(from) incline, initial encounter    Cellulitis    Cellulitis of left foot    Uncontrolled pain    Generalized weakness    Gait disturbance    Charcot's joint of left foot    Ischemic ulcer of left heel with fat layer exposed ([address]       Active Problems  Principal Problem:    Cellulitis of left foot  Active Problems: Morbid obesity due to excess calories (HCC)    Uncontrolled pain    Generalized weakness    Gait disturbance    Charcot's joint of left foot    Ischemic ulcer of left heel with fat layer exposed ([address]  Resolved Problems:    * No resolved hospital problems. *    Electronically signed by: Electronically signed by Jignesh Paulino.  CHUCK Mcnulty CNP on 6/3/2020 at 9:30 AM

## 2020-06-03 NOTE — PROGRESS NOTES
GENERAL SURGERY PROGRESS NOTE    CC/HPI:           Patient feels better from her surgery last week, path, benign!!!.       Vitals:    06/02/20 0339 06/02/20 0938 06/02/20 1730 06/03/20 0419   BP: (!) 146/63 (!) 144/62 138/64 (!) 154/71   Pulse: 81  82 79   Resp: 18  18 16   Temp: 97.4 °F (36.3 °C)  98.4 °F (36.9 °C) 98.2 °F (36.8 °C)   TempSrc: Oral  Oral Oral   SpO2: 94%  96% 93%   Weight: 228 lb 8 oz (103.6 kg)      Height: 5' 5\" (1.651 m)        I/O last 3 completed shifts: In: 280 [P.O.:280]  Out: -   No intake/output data recorded. DIET GENERAL;  Dietary Nutrition Supplements: Wound Healing Oral Supplement    No results found for this or any previous visit (from the past 50 hour(s)). Scheduled Meds:   piperacillin-tazobactam  3.375 g Intravenous Q8H    amLODIPine  5 mg Oral Daily    pantoprazole  40 mg Oral QAM    pregabalin  150 mg Oral BID    enoxaparin  40 mg Subcutaneous Daily    sennosides-docusate sodium  1 tablet Oral Daily    lactobacillus  1 capsule Oral Daily       Continuous Infusions:      Physical Exam:  HEENT: Anicteric sclerae, Oropharyngeal mucosae moist, pink and intact. Heart:  Normal S1 and S2, RRR  Lungs: Clear to auscultation bilaterally, No audible Wheezes or Rales. Extremities: No edema. Neuro: Alert and Oriented x 3, Non focal.  Abdomen: Soft, Benign, Non tender, Non distended, Positive bowel sounds. Incision: Nicely healing: No erythema, No purulent discharge. Principal Problem:    Cellulitis of left foot  Active Problems: Morbid obesity due to excess calories (HCC)    Uncontrolled pain    Generalized weakness    Gait disturbance    Charcot's joint of left foot    Ischemic ulcer of left heel with fat layer exposed ([address]  Resolved Problems:    * No resolved hospital problems.  *      Assessment and Plan:  Kwesi Gudino is a 80 y.o. female who is POD # 5 status post:  1) Incision and Drainage of the 6 cm x 7 cm un-loculated infected necrotic left foot

## 2020-06-03 NOTE — PROGRESS NOTES
grocery shops for her)  ADL Assistance: Independent  Homemaking Assistance: Independent  Homemaking Responsibilities: Yes  Meal Prep Responsibility: Primary  Laundry Responsibility: Primary  Cleaning Responsibility: Primary  Bill Paying/Finance Responsibility: Primary  Ambulation Assistance: Independent  Transfer Assistance: Independent  Active : No  Occupation: Retired  Type of occupation: Patient worked at [employer] in the [place] [employer] Enjoys being outside, working on her yard, going out with friends  Additional Comments: Pt reports 4 fall within the last week, unsure why     Objective                                                                                    Goals:  (Update in navigator)  Short term goals  Time Frame for Short term goals: 5 tx days:   Short term goal 1: Pt will complete bed mobility and sup<->sit Ind. Short term goal 2: Pt will complete OOB transfers using RW and L post-op shoe Mod Ind. Short term goal 3: Pt will ambulate 150 ft using RW and L post-op shoe with Sup. Short term goal 4: Pt will complete object retrieval from the floor in standing using reacher Mod Ind. Short term goal 5: Pt will consistently propel manual w/c >/=200 ft Mod Ind. :  Long term goals  Time Frame for Long term goals : 10-12 tx days:   Long term goal 1: Pt will ambulate >/=150 ft using RW and L post-op shoe Mod Ind. Long term goal 2: Pt will ascend/descend 4-5 steps using bilat rails and L post-op shoe Mod Ind. Long term goal 3: Pt will ascend/descend curb step and ambulate over uneven surfaces using RW and L post-op shoe Mod Ind. :        Plan of Care                                                                              Times per week: 5 days per week for a minimum of 60 minutes/day plus group as appropriate for 60 minutes.   Treatment to include Current Treatment Recommendations: Strengthening, Balance Training, Transfer Training, Endurance Training, Gait Training, Safety

## 2020-06-03 NOTE — PROGRESS NOTES
Physical Rehabilitation: OCCUPATIONAL THERAPY     [x] daily progress note       [] discharge       Patient Name:  Cheyenne Dominguez   :  1931 MRN: [id number]  Room:  [address] [place] [address] Date of Admission: 2020  Rehabilitation Diagnosis:   Cellulitis of left foot [L03.116]       Date 6/3/2020       Day of ARU Week:  5   Time IN/OUT 1245/1345   Individual Tx Minutes 60   Group Tx Minutes    Co-Treat Minutes    Concurrent Tx Minutes    TOTAL Tx Time Mins 60   Variance Time    Variance Time []   Refusal due to:     []   Medical hold/reason:    []   Illness   []   Off Unit for test/procedure  []   Extra time needed to complete task  []   Therapeutic need  []   Other (specify):   Restrictions Restrictions/Precautions: Fall Risk, Up as Tolerated, General Precautions, Weight Bearing(toe-touch weight bearing LLE )         Communication with other providers: [x]   OK to see per nursing:     []   Spoke with team member regarding:      Subjective observations and cognitive status:  patient agreeable to therapy session at this time      Pain level/location:  6  /10       Location: R shoulder and upper rib region from previous fall per patient    Discharge recommendations  Anticipated discharge date:  TBD  Destination: []home alone   []home alone w assist prn   [] home w/ family    [] Continuous supervision       []SNF    [] Assisted living     [] Other:   Continued therapy: []HHC OT  []OUTPATIENT  OT   [] No Further OT  Equipment needs:TBD     Toileting:   SBA with one LOB episode while pulling pants over hips with CGA to correct    Patient stands at sink to completes grooming and hair brushing after toileting approx 5 min with SBA and using L hip to support at sink       Toilet Transfers:  SBA c vc for TTWB status and utilizing grab bars (patient states she only has grab bars in shower area at home and not by the toilet)  Device Used:    []   Standard Toilet         []   Dulce Brown           []  Bedside Commode       [] (standing at sink). Long term goal 2: Pt will complete total bathing Ind c AE PRN (LHS). Long term goal 3: Pt will complete UE dressing Ind. Long term goal 4: Pt will complete LE dressing Ind c AE PRN (reacher). Long term goal 5: Pt will don/doff footwear Ind c AE PRN (sock aid, reacher). Long term goals 6: Pt will complete toileting Ind. Long term goal 7: Pt will tolerate at least 10 minutes of light static ther-ex/ther-ax for ADL/IADL completion.:        Plan of Care                                                                              Times per week: 5 days per week for a minimum of 60 minutes/day plus group as appropriate for 60 minutes.   Treatment to include Plan  Times per week: 5x  Times per day: Daily  Plan weeks: 10-14 days  Current Treatment Recommendations: Strengthening, Functional Mobility Training, Positioning, ROM, Pain Management, Equipment Evaluation, Education, & procurement, Balance Training, Safety Education & Training, Self-Care / ADL, Endurance Training    Electronically signed by   SAMM Murphy  6/3/2020, 8:09 AM

## 2020-06-03 NOTE — PROGRESS NOTES
Dino Patel    : 1931  Acct #: [de-identified]  MRN: [id number]              PM&R Progress Note      Admitting diagnosis: ***    Comorbid diagnoses impacting rehabilitation: ***    Chief complaint: ***    Prior (baseline) level of function: Independent. Current level of function:         Current  IRF-MONTSE and Goals:   Hearing, Speech, and Vision  Expression of Ideas and Wants: Without difficulty  Understanding Verbal and Non-Verbal Content: Understands  Prior Functioning: Everyday Activities  Self Care: Independent  Indoor Mobility (Ambulation): Independent  Stairs: Not applicable  Functional Cognition: Independent  Prior Device Use: Walker    Eating  Assistance Needed: Independent  CARE Score: 6  Discharge Goal: Independent  Oral Hygiene  Assistance Needed: Supervision or touching assistance  Comment: Steady assist for balance, pt felt weak so completed tasks seate din wc for safety. CARE Score: 4  Discharge Goal: Independent  Toileting Hygiene  Assistance Needed: Partial/moderate assistance  Comment: required Min A  CARE Score: 3  Discharge Goal: Independent  Shower/Bathe Self  Assistance Needed: Partial/moderate assistance  Comment: Britt for buttocks; SBA while standing d/t decrased balance  CARE Score: 3  Discharge Goal: Independent  Upper Body Dressing  Assistance Needed: Partial/moderate assistance  Comment: Britt to pull around trunk on R side  CARE Score: 3  Discharge Goal: Independent  Lower Body Dressing  Assistance Needed: Partial/moderate assistance  Comment: ModA to thread LLE and fully don pants d/t decraaed RUE ROM to hike pants  CARE Score: 3  Discharge Goal: Independent  Putting On/Taking Off Footwear  Assistance Needed: Partial/moderate assistance  Comment: ModA; pt able to doff R sock, however needed assist to don sock.   CARE Score: 3  Discharge Goal: Independent    Roll Left and Right  Assistance Needed: Supervision or touching assistance  Comment: required SBA, tasks completed without use of bed features; increased time and effort observed to complete rolling to L due to R shoulder pain   CARE Score: 4  Discharge Goal: Independent  Sit to Lying  Assistance Needed: Supervision or touching assistance  Comment: required SBA, transfer was completed without use of bed features, pt initiated problem solving to self-assist RLE  CARE Score: 4  Discharge Goal: Independent  Sit to Stand  Assistance Needed: Supervision or touching assistance  Comment: required CGA using RW   CARE Score: 4  Discharge Goal: Independent  Chair/Bed-to-Chair Transfer  Assistance Needed: Partial/moderate assistance  Comment: required Min A to sit due to decreased eccentric control   CARE Score: 3  Discharge Goal: Independent  Toilet Transfer  Assistance Needed: Supervision or touching assistance  Comment: required CGA using grab bars   CARE Score: 4  Discharge Goal: Independent  Car Transfer  Assistance Needed: Supervision or touching assistance  Comment: required CGA   CARE Score: 4  Discharge Goal: Independent   Walk 10 Feet? Walk 10 Feet?: Yes  1 Step  1 Step?: Yes  Reason if not Attempted: Not attempted due to environmental limitations  Picking Up Object  Assistance Needed: Supervision or touching assistance  Comment: required CGA using reacher (pt used adaptive equipment at baseline)   Reason if not Attempted: Not attempted due to medical condition or safety concerns  CARE Score: 4  Discharge Goal: Independent  Wheelchair Ability  Uses a Wheelchair and/or Scooter?: Yes  Wheel 50 Feet with Two Turns  Assistance Needed: Independent  CARE Score: 6  Discharge Goal: Independent  Wheel 150 Feet  Assistance Needed: Independent  Comment: pt was able to propel up to 155 ft   CARE Score: 6  Discharge Goal: Independent            I      Exam:    Blood pressure (!) 144/63, pulse 79, temperature 98.2 °F (36.8 °C), temperature source Oral, resp.  rate 16, height 5' 5\" (1.651 m), weight 228 lb 8 oz (103.6 kg), SpO2 93 %, not currently

## 2020-06-03 NOTE — PLAN OF CARE
Problem: Falls - Risk of:  Goal: Will remain free from falls  Outcome: Ongoing  Goal: Absence of physical injury  Outcome: Ongoing     Problem: Pain:  Goal: Pain level will decrease  Outcome: Ongoing  Goal: Control of acute pain  Outcome: Ongoing  Goal: Control of chronic pain  Outcome: Ongoing  Goal: Patient's pain/discomfort is manageable  Outcome: Ongoing     Problem: Infection:  Goal: Will remain free from infection  Outcome: Ongoing     Problem: Safety:  Goal: Free from accidental physical injury  Outcome: Ongoing  Goal: Free from intentional harm  Outcome: Ongoing     Problem: Daily Care:  Goal: Daily care needs are met  Outcome: Ongoing     Problem: Skin Integrity:  Goal: Skin integrity will stabilize  Outcome: Ongoing     Problem: Discharge Planning:  Goal: Patients continuum of care needs are met  Outcome: Ongoing

## 2020-06-04 NOTE — CONSULTS
On unit for PICC consult, patient is unavailable until 1630 due to therapy schedule. Consult deferred at this time.

## 2020-06-04 NOTE — PROGRESS NOTES
per week for a minimum of 60 minutes/day plus group as appropriate for 60 minutes.   Treatment to include Current Treatment Recommendations: Strengthening, Balance Training, Transfer Training, Endurance Training, Gait Training, Safety Education & Training, Stair training, Wheelchair Mobility Training, Functional Mobility Training, Home Exercise Program, Patient/Caregiver Education & Training, ROM, Equipment Evaluation, Education, & procurement, Pain Management    Electronically signed by   Bibi Johnson, PT   6/4/2020, 8:40 AM

## 2020-06-04 NOTE — PROGRESS NOTES
GENERAL SURGERY PROGRESS NOTE    CC/HPI:           Patient feels better from her surgery last week, path, benign!!!.       Vitals:    06/03/20 0841 06/03/20 1526 06/04/20 0356 06/04/20 0838   BP: (!) 144/63 128/65 134/62 132/62   Pulse:  85 76 90   Resp:  16 16    Temp:  97.8 °F (36.6 °C) 98 °F (36.7 °C)    TempSrc:  Oral     SpO2:  97% 92%    Weight:       Height:         I/O last 3 completed shifts: In: 710 [P.O.:600; I.V.:110]  Out: -   I/O this shift:  In: 290 [P.O.:240; I.V.:50]  Out: -     DIET GENERAL;  Dietary Nutrition Supplements: Wound Healing Oral Supplement    Recent Results (from the past 48 hour(s))   C-reactive protein    Collection Time: 06/03/20  6:04 AM   Result Value Ref Range    CRP, High Sensitivity 30.3 mg/L   C-reactive protein    Collection Time: 06/04/20  4:56 AM   Result Value Ref Range    CRP, High Sensitivity 14.7 mg/L       Scheduled Meds:   piperacillin-tazobactam  3.375 g Intravenous Q8H    amLODIPine  5 mg Oral Daily    pantoprazole  40 mg Oral QAM    pregabalin  150 mg Oral BID    enoxaparin  40 mg Subcutaneous Daily    sennosides-docusate sodium  1 tablet Oral Daily    lactobacillus  1 capsule Oral Daily       Continuous Infusions:      Physical Exam:  HEENT: Anicteric sclerae, Oropharyngeal mucosae moist, pink and intact. Heart:  Normal S1 and S2, RRR  Lungs: Clear to auscultation bilaterally, No audible Wheezes or Rales. Extremities: No edema. Neuro: Alert and Oriented x 3, Non focal.  Abdomen: Soft, Benign, Non tender, Non distended, Positive bowel sounds. Incision: Nicely healing: No erythema, No purulent discharge. Principal Problem:    Cellulitis of left foot  Active Problems: Morbid obesity due to excess calories (HCC)    Uncontrolled pain    Generalized weakness    Gait disturbance    Charcot's joint of left foot    Ischemic ulcer of left heel with fat layer exposed ([address]  Resolved Problems:    * No resolved hospital problems.  *      Assessment and Plan:  Pepe Murry is a 80 y.o. female who is POD # 6 status post:  1) Incision and Drainage of the 6 cm x 7 cm un-loculated infected necrotic left foot infected ulcer, Gram stain/C&S, aerobic and anaerobic cultures sent; Pulsed irrigation with 3 L of diluted betadine, packing the wound with 1\" Iodoform. 2) Excisional biopsy of the surrounding wound \"tissue\" / \"mass\" to pathology. .    Pathology benign: \"Final Pathologic Diagnosis:  Fragments of dense fibrous tissue, clinically left foot  mass:         Hyaline fibrosis with acute inflammation, defuse  vascular thrombosis and necrosis having        features of abscess. \"  Continue Abx, and local wound care. Increase Ambulation to at least 4x/day walk in the hallways with assistance. Respiratory virgie-operative care: Incentive Spirometry / deep breathing and coughing 10x/hr while awake. Continue DVT prophylaxis with Teds and SCDs and SC Lovenox. Continue GI prophylaxis with Protonix IV till able to tolerate PO. Continue virgie-op Abx - ID's help greatly appreciated:  · \"DC cefepime  · Start Zosyn, plan for 2 weeks if soft tissue infection, plan for 6 weeks if OM  ? Wound care & CRP x 3\".     I would presume OM, due to the LENGTH of the infection, and recommend 6 wks of the Abx.    ___________________________________________    Shar Edwards MD, FACS, FICS  6/4/2020  10:20 AM

## 2020-06-04 NOTE — CONSULTS
PICC Consult complete. Education regarding insertion of PICC reviewed with patioent. Risk/benefit/and alternatives discussed. verbalized understanding. Consent given by patient. Time out done at 1550. PICC line inserted right upper arm  without difficulty per protocol. Pt tolerated well. Good blood return and flushes easily. ECG tip confirmation system utilized for tip placement with P wave changes noted by RN during insertion and ECG strips left on chart. Corinna RN notified OK to use PICC line now. Educational booklet left at bedside.   Ava Davis

## 2020-06-04 NOTE — PROGRESS NOTES
distress and CTA. Good air movement. Heart: RRR and no MRG. Abd: soft, non-distended, no tenderness, no hepatomegaly. Normoactive bowel sounds. Ext: no clubbing, cyanosis, see wounds above,   Neuro: Mental status intact. CN 2-12 intact and no focal sensory or motor deficits     Radiologic / Imaging / TESTING  5/27/20 XR Chest Portable:  Impression   No evidence of acute cardiopulmonary disease.       Osteopenia.  A displaced right humeral fracture is not identified.      5/27/20 XR Humerus Right:  Impression   No evidence of acute cardiopulmonary disease.       Osteopenia.  A displaced right humeral fracture is not identified.      5/19/20 MRI Foot Left WO W Contrast:  Impression   1. Deep soft tissue ulceration plantar to the cuboid contiguous with a   loculated collection of pockets of gas and fluid in the adjacent subcutaneous   fat measuring approximately 5.8 x 1.7 x 5.7 cm compatible with an abscess. 2. Midfoot disorganization and degenerative changes compatible with chronic   neuropathic changes.  Moderate marrow edema in the medial cuneiform.  Given   the lack of an adjacent soft tissue ulceration this may represent reactive   osteitis with early osteomyelitis not excluded but less likely. 3. No significant marrow edema adjacent to the soft tissue ulceration which   most closely approaches the cuboid.         Labs:    Recent Results (from the past 24 hour(s))   C-reactive protein    Collection Time: 06/04/20  4:56 AM   Result Value Ref Range    CRP, High Sensitivity 14.7 mg/L     CULTURE results: Invalid input(s): BLOOD CULTURE,  URINE CULTURE, SURGICAL CULTURE    Diagnosis:  Patient Active Problem List   Diagnosis    Closed fracture of right hip (HCC)    HTN (hypertension)    Vaginal vault prolapse after hysterectomy    Varicose veins of leg with swelling    Venous stasis dermatitis of left lower extremity    Chronic venous hypertension    Lower leg edema    Blister of foot, left   

## 2020-06-05 NOTE — PROGRESS NOTES
Case mgt received message the [facility]'s preference [place] [facility]. Case mgt left VM for Junior to assess patient SNF vs [facility]    Case mgt spoke with [facility]. They want to skill patient. Case mgt will alert Dr Nemesio Tony.

## 2020-06-05 NOTE — PROGRESS NOTES
use of bed features; increased time and effort observed to complete rolling to L due to R shoulder pain   CARE Score: 4  Discharge Goal: Independent  Sit to Lying  Assistance Needed: Supervision or touching assistance  Comment: required SBA, transfer was completed without use of bed features, pt initiated problem solving to self-assist RLE  CARE Score: 4  Discharge Goal: Independent  Sit to Stand  Assistance Needed: Supervision or touching assistance  Comment: required CGA using RW   CARE Score: 4  Discharge Goal: Independent  Chair/Bed-to-Chair Transfer  Assistance Needed: Partial/moderate assistance  Comment: required Min A to sit due to decreased eccentric control   CARE Score: 3  Discharge Goal: Independent  Toilet Transfer  Assistance Needed: Supervision or touching assistance  Comment: required CGA using grab bars   CARE Score: 4  Discharge Goal: Independent  Car Transfer  Assistance Needed: Supervision or touching assistance  Comment: required CGA   CARE Score: 4  Discharge Goal: Independent   Walk 10 Feet? Walk 10 Feet?: Yes  1 Step  1 Step?: Yes  Reason if not Attempted: Not attempted due to environmental limitations  Picking Up Object  Assistance Needed: Supervision or touching assistance  Comment: required CGA using reacher (pt used adaptive equipment at baseline)   Reason if not Attempted: Not attempted due to medical condition or safety concerns  CARE Score: 4  Discharge Goal: Independent  Wheelchair Ability  Uses a Wheelchair and/or Scooter?: Yes  Wheel 50 Feet with Two Turns  Assistance Needed: Independent  CARE Score: 6  Discharge Goal: Independent  Wheel 150 Feet  Assistance Needed: Independent  Comment: pt was able to propel up to 155 ft   CARE Score: 6  Discharge Goal: Independent            I      Exam:    Blood pressure (!) 155/72, pulse 85, temperature 96.4 °F (35.8 °C), temperature source Oral, resp.  rate 16, height 5' 5\" (1.651 m), weight 228 lb 8 oz (103.6 kg), SpO2 94 %, not currently

## 2020-06-06 NOTE — PLAN OF CARE
Anitra Live RN  Outcome: Ongoing  6/6/2020 0055 by Wiley Piedra RN  Outcome: Ongoing     Problem: Skin Integrity:  Goal: Skin integrity will stabilize  Description: Skin integrity will stabilize  6/6/2020 1029 by Yamini Fischer RN  Outcome: Ongoing  6/6/2020 0055 by Wiley Piedra RN  Outcome: Ongoing     Problem: Discharge Planning:  Goal: Patients continuum of care needs are met  Description: Patients continuum of care needs are met  6/6/2020 1029 by Yamini Fischer RN  Outcome: Ongoing  6/6/2020 0055 by Wiley Piedra RN  Outcome: Ongoing

## 2020-06-06 NOTE — PLAN OF CARE
Ongoing  6/5/2020 1533 by Tiana Edwards RN  Outcome: Ongoing     Problem: Skin Integrity:  Goal: Skin integrity will stabilize  Description: Skin integrity will stabilize  6/6/2020 0055 by Panchito Farmer RN  Outcome: Ongoing  6/5/2020 1533 by Tiana Edwards RN  Outcome: Ongoing     Problem: Discharge Planning:  Goal: Patients continuum of care needs are met  Description: Patients continuum of care needs are met  6/6/2020 0055 by Panchito Farmer RN  Outcome: Ongoing  6/5/2020 1533 by Tiana Edwards RN  Outcome: Ongoing

## 2020-06-08 NOTE — PLAN OF CARE
Problem: Falls - Risk of:  Goal: Will remain free from falls  Description: Will remain free from falls  6/8/2020 1040 by Mukesh Agustin RN  Outcome: Ongoing  6/7/2020 2121 by Collette Rivera RN  Outcome: Ongoing  Goal: Absence of physical injury  Description: Absence of physical injury  6/8/2020 1040 by Mukesh Agustin RN  Outcome: Ongoing  6/7/2020 2121 by Collette Rivera RN  Outcome: Ongoing     Problem: Pain:  Goal: Pain level will decrease  Description: Pain level will decrease  6/8/2020 1040 by Mukesh Agustin RN  Outcome: Ongoing  6/7/2020 2121 by Collette Rivera RN  Outcome: Ongoing  Goal: Control of acute pain  Description: Control of acute pain  6/8/2020 1040 by Mukesh Agustin RN  Outcome: Ongoing  6/7/2020 2121 by Collette Rivera RN  Outcome: Ongoing  Goal: Control of chronic pain  Description: Control of chronic pain  6/8/2020 1040 by Mukesh Agustin RN  Outcome: Ongoing  6/7/2020 2121 by Collette Rivera RN  Outcome: Ongoing  Goal: Patient's pain/discomfort is manageable  Description: Patient's pain/discomfort is manageable  6/8/2020 1040 by Mukesh Agustin RN  Outcome: Ongoing  6/7/2020 2121 by Collette Rivera RN  Outcome: Ongoing     Problem: Infection:  Goal: Will remain free from infection  Description: Will remain free from infection  6/8/2020 1040 by Mukesh Agustin RN  Outcome: Ongoing  6/7/2020 2121 by Collette Rivera RN  Outcome: Ongoing     Problem: Safety:  Goal: Free from accidental physical injury  Description: Free from accidental physical injury  6/8/2020 1040 by Mukesh Agustin RN  Outcome: Ongoing  6/7/2020 2121 by Collette Rivera RN  Outcome: Ongoing  Goal: Free from intentional harm  Description: Free from intentional harm  6/8/2020 1040 by Mukesh Agustin RN  Outcome: Ongoing  6/7/2020 2121 by Collette Rivera RN  Outcome: Ongoing     Problem: Daily Care:  Goal: Daily care needs are met  Description: Daily care needs are met  6/8/2020 1040 by Tonya Juarez RN  Outcome: Ongoing  6/7/2020 2121 by Rico Saldana RN  Outcome: Ongoing     Problem: Skin Integrity:  Goal: Skin integrity will stabilize  Description: Skin integrity will stabilize  6/8/2020 1040 by Tonya Juarez RN  Outcome: Ongoing  6/7/2020 2121 by Rico Saldana RN  Outcome: Ongoing     Problem: Discharge Planning:  Goal: Patients continuum of care needs are met  Description: Patients continuum of care needs are met  6/8/2020 1040 by Tonya Juarez RN  Outcome: Ongoing  6/7/2020 2121 by Rico Saldana RN  Outcome: Ongoing

## 2020-06-08 NOTE — PROGRESS NOTES
EOMI, conjunctiva pink, sclera anicteric. Neck: Supple. Trachea midline. No LAD. Chest: no distress and CTA. Good air movement. Heart: RRR and no MRG. Abd: soft, non-distended, no tenderness, no hepatomegaly. Normoactive bowel sounds. Ext: no clubbing, cyanosis, see wounds above,   Neuro: Mental status intact. CN 2-12 intact and no focal sensory or motor deficits     Radiologic / Imaging / TESTING  5/27/20 XR Chest Portable:  Impression   No evidence of acute cardiopulmonary disease.       Osteopenia.  A displaced right humeral fracture is not identified.      5/27/20 XR Humerus Right:  Impression   No evidence of acute cardiopulmonary disease.       Osteopenia.  A displaced right humeral fracture is not identified.      5/19/20 MRI Foot Left WO W Contrast:  Impression   1. Deep soft tissue ulceration plantar to the cuboid contiguous with a   loculated collection of pockets of gas and fluid in the adjacent subcutaneous   fat measuring approximately 5.8 x 1.7 x 5.7 cm compatible with an abscess. 2. Midfoot disorganization and degenerative changes compatible with chronic   neuropathic changes.  Moderate marrow edema in the medial cuneiform.  Given   the lack of an adjacent soft tissue ulceration this may represent reactive   osteitis with early osteomyelitis not excluded but less likely. 3. No significant marrow edema adjacent to the soft tissue ulceration which   most closely approaches the cuboid. Labs:    No results found for this or any previous visit (from the past 24 hour(s)).   CULTURE results: Invalid input(s): BLOOD CULTURE,  URINE CULTURE, SURGICAL CULTURE    Diagnosis:  Patient Active Problem List   Diagnosis    Closed fracture of right hip (HCC)    HTN (hypertension)    Vaginal vault prolapse after hysterectomy    Varicose veins of leg with swelling    Venous stasis dermatitis of left lower extremity    Chronic venous hypertension    Lower leg edema    Blister of foot, left   

## 2020-06-08 NOTE — PROGRESS NOTES
Brain.    ___________________________________________    Shar Edwards MD, FACS, FICS  6/8/2020  12:30 PM

## 2020-06-08 NOTE — PLAN OF CARE
Problem: Falls - Risk of:  Goal: Will remain free from falls  Description: Will remain free from falls  6/7/2020 2121 by Tori Olivas RN  Outcome: Ongoing  6/7/2020 1020 by Yana Crowder LPN  Outcome: Ongoing  Goal: Absence of physical injury  Description: Absence of physical injury  6/7/2020 2121 by Tori Olivas RN  Outcome: Ongoing  6/7/2020 1020 by Yana Crowder LPN  Outcome: Ongoing     Problem: Pain:  Goal: Pain level will decrease  Description: Pain level will decrease  6/7/2020 2121 by Tori Olivas RN  Outcome: Ongoing  6/7/2020 1020 by Yana Crowder LPN  Outcome: Ongoing  Goal: Control of acute pain  Description: Control of acute pain  6/7/2020 2121 by Tori Olivas RN  Outcome: Ongoing  6/7/2020 1020 by Yana Crowder LPN  Outcome: Ongoing  Goal: Control of chronic pain  Description: Control of chronic pain  6/7/2020 2121 by Tori Olivas RN  Outcome: Ongoing  6/7/2020 1020 by Yana Crowder LPN  Outcome: Ongoing  Goal: Patient's pain/discomfort is manageable  Description: Patient's pain/discomfort is manageable  6/7/2020 2121 by Tori Olivas RN  Outcome: Ongoing  6/7/2020 1020 by Yana Crowder LPN  Outcome: Ongoing     Problem: Infection:  Goal: Will remain free from infection  Description: Will remain free from infection  6/7/2020 2121 by Tori Olivas RN  Outcome: Ongoing  6/7/2020 1020 by Yana Crowder LPN  Outcome: Ongoing     Problem: Safety:  Goal: Free from accidental physical injury  Description: Free from accidental physical injury  6/7/2020 2121 by Tori Olivas RN  Outcome: Ongoing  6/7/2020 1020 by Yana Crowder LPN  Outcome: Ongoing  Goal: Free from intentional harm  Description: Free from intentional harm  6/7/2020 2121 by Tori Olivas RN  Outcome: Ongoing  6/7/2020 1020 by Yana Crowder LPN  Outcome: Ongoing     Problem: Daily Care:  Goal: Daily care needs are met  Description: Daily care needs are met  6/7/2020 2121 by Tori Olivas

## 2020-06-08 NOTE — FLOWSHEET NOTE
precautions  []   Postural awareness  []   Family training  [x]   Progress was updated in Rehabtracker this date. Treatment Plan for Next Session: gait; transfers; advanced gait; stair training; exercises     Assessment:    Treatment/Activity Tolerance:   [x] Tolerated treatment with no adverse effects    [] Patient limited by fatigue  [] Patient limited by pain   [] Patient limited by medical complications:    [] Adverse reaction to Tx:   [] Significant change in status    Safety:             []  Telesitter activated      [x]  Gait belt used during tx session      [x]other: pt left sitting up in w/c and passed to ELENA Polanco at end of PT treatment session.           Number of Minutes/Billable Intervention  Gait Training 45   Therapeutic Exercise    Neuro Re-Ed    Therapeutic Activity 15   Wheelchair Propulsion    Group    Other:    TOTAL 60         Social History  Social/Functional History  Lives With: Alone  Type of Home: House  Home Layout: One level  Home Access: Level entry  Bathroom Shower/Tub: Walk-in shower, Shower chair with back  Bathroom Toilet: Handicap height  Bathroom Equipment: 3-in-1 commode  Bathroom Accessibility: Accessible  Home Equipment: Rolling walker, 4 wheeled walker, [address] Help From: Family(Daughter grocery shops for her)  ADL Assistance: Independent  Homemaking Assistance: Independent  Homemaking Responsibilities: Yes  Meal Prep Responsibility: Primary  Laundry Responsibility: Primary  Cleaning Responsibility: Primary  Bill Paying/Finance Responsibility: Primary  Ambulation Assistance: Independent  Transfer Assistance: Independent  Active : No  Occupation: Retired  Type of occupation: Patient worked at [employer] in [id number]: Enjoys being outside, working on her yard, going out with friends  Additional Comments: Pt reports 4 fall within the last week, unsure why     Objective

## 2020-06-09 NOTE — DISCHARGE INSTR - COC
Continuity of Care Form    Patient Name: Karin Burr   :  1931  MRN:  [id number]  Admit date:  2020  Discharge date:  6/10/2020    Code Status Order: DNR-CCA   Advance Directives:   [facility] Documentation     Date/Time Healthcare Directive Type of Healthcare Directive Copy in [facility] [address] Agent's Name Healthcare Agent's Phone Number    20 1630  No, patient does not have an advance directive for healthcare treatment -- -- -- -- --          Admitting Physician:  Rebeca Ho MD  PCP: Britt Noe MD    Discharging Nurse: Dulce Maria May 7010 Unit/Room#: 0492/9609-G  Discharging Unit Phone Number: [phone]    Emergency Contact:   Extended Emergency Contact Information  Primary Emergency Contact: [facility]  Address: [facility] [address] Phone: [phone]  Mobile Phone: [phone]  Relation: Child    Past Surgical History:  Past Surgical History:   Procedure Laterality Date    APPENDECTOMY      BLADDER SUSPENSION  's    BREAST CYST EXCISION Right     Benign    COLONOSCOPY  Last Done In     Polpys Removed In Past    DENTAL SURGERY      Teeth Extracted In Past    DILATION AND CURETTAGE OF UTERUS      \"2 Or 3 \" Prior To DUANE In     FOOT DEBRIDEMENT Left 2020    FOOT ABSCESS DEBRIDEMENT INCISION AND DRAINAGE LEFT, EXCISIONAL BIOPSY OF THE LEFT FOOT MASS performed by Jm Wren MD at [address] Left 12    Broken Left Femur With Hardware    HYSTERECTOMY, TOTAL ABDOMINAL      JOINT REPLACEMENT Right -14    Total Right Knee    OTHER SURGICAL HISTORY  11/23/15    Robotic Assisted Laparoscopic Sacrocolpopexy    OTHER SURGICAL HISTORY Right 2018    orif right patella    OVARIAN CYST SURGERY Right     \"Right\"    SKIN CANCER EXCISION      Skin Cancer Excised Back Of Right Leg    TOE AMPUTATION Right 1990's Or 2000's    Right Foot Little Toe    TOE AMPUTATION Right 5-23-13    Second Toe    TONSILLECTOMY  1962    TOTAL HIP ARTHROPLASTY Right 08/21/2016       Immunization History:   Immunization History   Administered Date(s) Administered    Pneumococcal Conjugate 7-valent ([place]) 07/06/2007       Active Problems:  Patient Active Problem List   Diagnosis Code    Closed fracture of right hip ([facility] [address] S72.001A    HTN (hypertension) I10    Vaginal vault prolapse after hysterectomy N99.3    Varicose veins of leg with swelling I83.899    Venous stasis dermatitis of left lower extremity I87.2    Chronic venous hypertension I87.309    Lower leg edema R60.0    Blister of foot, left X09.301Z    Lymphedema of both lower extremities I89.0    Closed displaced fracture of right patella S82.001A    Fall at home W19. Marely Del Valle, Y92.009    Left skew foot deformity M21.6X2    Postural dizziness with near syncope R42, R55    Fall at home, sequela W19. Chandler Reddy, Y92.009    Acute kidney injury ([facility] N17.9    Frequent falls R29.6    Morbid obesity due to excess calories ([facility] E66.01    Fall at home, subsequent encounter W19. XXXD, Y92.009    Fall (on)(from) incline, initial encounter W10. 2XXA    Cellulitis L03.90    Cellulitis of left foot L03. 116    Uncontrolled pain R52    Generalized weakness R53.1    Gait disturbance R26.9    Charcot's joint of left foot M14.672    Ischemic ulcer of left heel with fat layer exposed ([facility]) L97.422    Pyogenic inflammation of bone ([facility]) M86.9    Pseudomonas infection A49.8       Isolation/Infection:   Isolation          No Isolation        Patient Infection Status     Infection Onset Added Last Indicated Last Indicated By Review Planned Expiration Resolved Resolved By    None active    Resolved    COVID-19 Rule Out 05/30/20 05/30/20 05/30/20 COVID-19 (Ordered)   06/07/20 Pete Boothe LPN    Rule Out Test Resulted - Not Detected    COVID-19 Rule Out Description Serosanguinous 6/9/2020  9:40 AM   Odor None 6/9/2020  9:40 AM   Margins Defined edges 6/9/2020  9:40 AM   Emily-wound Assessment Dry 6/9/2020  9:40 AM   Non-staged Wound Description Full thickness 6/9/2020  9:40 AM   [place]%Wound Bed 50 6/9/2020  9:40 AM   Red%Wound Bed 20 6/9/2020  9:40 AM   Yellow%Wound Bed 30 6/9/2020  9:40 AM   Number of days: 8       Wound 06/01/20 Foot Left;Lateral;Plantar (Active)   Wound Non-Healing Surgical 6/9/2020  9:40 AM   Dressing Status Changed 6/9/2020  9:40 AM   Dressing Changed Changed/New 6/9/2020  9:40 AM   Wound Cleansed Rinsed/Irrigated with saline 6/9/2020  9:40 AM   Dressing Change Due 06/09/20 6/9/2020  6:01 AM   Wound Length (cm) 0.7 cm 6/9/2020  9:40 AM   Wound Width (cm) 0.5 cm 6/9/2020  9:40 AM   Wound Depth (cm) 1 cm 6/9/2020  9:40 AM   Wound Surface Area (cm^2) 0.35 cm^2 6/9/2020  9:40 AM   Change in Wound Size % (l*w) 22.22 6/9/2020  9:40 AM   Wound Volume (cm^3) 0.35 cm^3 6/9/2020  9:40 AM   Wound Healing % 51 6/9/2020  9:40 AM   Distance Tunneling (cm) 0 cm 6/9/2020  9:40 AM   Tunneling Position ___ O'Clock 0 6/9/2020  9:40 AM   Undermining Starts ___ O'Clock 8 6/9/2020  9:40 AM   Undermining Ends___ O'Clock 10 6/9/2020  9:40 AM   Undermining Maxium Distance (cm) 1 6/9/2020  9:40 AM   Wound Assessment Red;Pink 6/9/2020  9:40 AM   Drainage Amount Large 6/9/2020  9:40 AM   Drainage Description Serosanguinous 6/9/2020  9:40 AM   Odor None 6/9/2020  9:40 AM   Margins Defined edges 6/9/2020  9:40 AM   Emily-wound Assessment Dry 6/9/2020  9:40 AM   Non-staged Wound Description Full thickness 6/9/2020  9:40 AM   [place]%Wound Bed 50 6/9/2020  9:40 AM   Red%Wound Bed 50 6/9/2020  9:40 AM   Yellow%Wound Bed 10 6/1/2020  9:27 PM   Number of days: 8       Wound 06/07/20 Buttocks red with small open area (Active)   Wound Pressure Stage  2 6/9/2020  7:49 AM   Dressing/Treatment Calmoseptine 6/8/2020  8:04 PM   Number of days: 1        Elimination:  Continence:   · Bowel: · Name:  · Address:  · Dialysis Schedule:  · Phone:  · Fax:    / signature: Electronically signed by Vinay Evans on 6/9/20 at 2:53 PM EDT    PHYSICIAN SECTION    Prognosis: Fair    Condition at Discharge: Stable    Rehab Potential (if transferring to Rehab): Fair    Recommended Labs or Other Treatments After Discharge: Zosyn 4.5 gm IV Q 6 hrs through 7/13/2020. Physician Certification: I certify the above information and transfer of Samuel Vang  is necessary for the continuing treatment of the diagnosis listed and that she requires [facility] for less 30 days.      Update Admission H&P: No change in H&P    PHYSICIAN SIGNATURE:  Electronically signed by Veatrice Lennox, MD on 6/10/20 at 2:54 PM EDT

## 2020-06-09 NOTE — PROGRESS NOTES
Case mgt spoke with Dr Glenys Huggins re: Katie Moore wants to skill r/t IV and does he want to change d/c date. Dr Glenys Huggins will let case mgt know.

## 2020-06-09 NOTE — CONSULTS
Robotic Assisted Laparoscopic Sacrocolpopexy    OTHER SURGICAL HISTORY Right 09/04/2018    orif right patella    OVARIAN CYST SURGERY Right 1957    \"Right\"    SKIN CANCER EXCISION  2000's    Skin Cancer Excised Back Of Right Leg    TOE AMPUTATION Right 1990's Or 2000's    Right Foot Little Toe    TOE AMPUTATION Right 5-23-13    Second Toe    TONSILLECTOMY  1962    TOTAL HIP ARTHROPLASTY Right 08/21/2016       FAMILY HISTORY    Family History   Problem Relation Age of Onset    Kidney Disease Mother     Heart Disease Mother         Heart Attack     Early Death Father 44        \"Muster Gas\"    Heart Disease Sister         Heart Attack    Early Death Brother 46    Diabetes Brother     Early Death Sister 2    Cancer Brother         Prostate Cancer    Kidney Disease Brother     Diabetes Brother     Diabetes Brother     Heart Disease Brother     Diabetes Son        SOCIAL HISTORY    Social History     Tobacco Use    Smoking status: Never Smoker    Smokeless tobacco: Never Used   Substance Use Topics    Alcohol use: No    Drug use: No       ALLERGIES    Allergies   Allergen Reactions    Percocet [Oxycodone-Acetaminophen]      \"I Get Got Crazy And Mean\"    Phenergan [Promethazine Hcl]      \"Violent Behavior\"      Tape [Adhesive Tape]      \"Redness And Tears The Skin , Paper Tape Is Ok To Use\"       MEDICATIONS    No current facility-administered medications on file prior to encounter. Current Outpatient Medications on File Prior to Encounter   Medication Sig Dispense Refill    pregabalin (LYRICA) 150 MG capsule Take 1 capsule by mouth 2 times daily.  60 capsule 0    lidocaine 4 % external patch Place 1 patch onto the skin daily 7 patch 0    docusate sodium (COLACE, DULCOLAX) 100 MG CAPS Take 100 mg by mouth 2 times daily 30 capsule 0    amLODIPine (NORVASC) 10 MG tablet Take 1 tablet by mouth daily 30 tablet 3    acetaminophen (TYLENOL) 500 MG tablet Take 2 tablets by mouth every 6 hours injury ([address]    Frequent falls    Morbid obesity due to excess calories ([address]    Fall at home, subsequent encounter    Fall (on)(from) incline, initial encounter    Cellulitis    Cellulitis of left foot    Uncontrolled pain    Generalized weakness    Gait disturbance    Charcot's joint of left foot    Ischemic ulcer of left heel with fat layer exposed ([address]    Pyogenic inflammation of bone ([address]    Pseudomonas infection       Measurements:  Incision 08/21/16 Hip (Active)   Number of days: 1387       Incision 09/04/18 Knee Right (Active)   Number of days: 643       Wound 02/03/20 Foot Left;Plantar (Active)   Number of days: 126       Wound 06/01/20 Foot Left;Plantar (Active)   Wound Non-Healing Surgical 6/9/2020  9:40 AM   Dressing Status Changed 6/9/2020  9:40 AM   Dressing Changed Changed/New 6/9/2020  9:40 AM   Wound Cleansed Rinsed/Irrigated with saline 6/9/2020  9:40 AM   Dressing Change Due 06/09/20 6/9/2020  6:01 AM   Wound Length (cm) 1.5 cm 6/9/2020  9:40 AM   Wound Width (cm) 2.2 cm 6/9/2020  9:40 AM   Wound Depth (cm) 1.5 cm 6/9/2020  9:40 AM   Wound Surface Area (cm^2) 3.3 cm^2 6/9/2020  9:40 AM   Change in Wound Size % (l*w) 8.33 6/9/2020  9:40 AM   Wound Volume (cm^3) 4.95 cm^3 6/9/2020  9:40 AM   Wound Healing % 35 6/9/2020  9:40 AM   Distance Tunneling (cm) 0 cm 6/9/2020  9:40 AM   Tunneling Position ___ O'Clock 0 6/9/2020  9:40 AM   Undermining Starts ___ O'Clock 8 6/9/2020  9:40 AM   Undermining Ends___ O'Clock 2 6/9/2020  9:40 AM   Undermining Maxium Distance (cm) 3 6/9/2020  9:40 AM   Wound Assessment Red;Pink;Yellow 6/9/2020  9:40 AM   Drainage Amount Large 6/9/2020  9:40 AM   Drainage Description Serosanguinous 6/9/2020  9:40 AM   Odor None 6/9/2020  9:40 AM   Margins Defined edges 6/9/2020  9:40 AM   Emily-wound Assessment Dry 6/9/2020  9:40 AM   Non-staged Wound Description Full thickness 6/9/2020  9:40 AM   [place]%Wound Bed 50 6/9/2020  9:40 AM   Red%Wound Bed 20 6/9/2020  9:40 AM Left;Lateral;Plantar-Dressing/Treatment: (1\" iodoform, opticell, abd, kerlix)    Pt in recliner. Agreeable to wound reassessment. Bilateral heels intact. Wounds to left plantar foot pictured and measurements taken. Appear stable. Cleansed with betadine/NS mix and hydrogen peroxide. Treatment as above per Dr. Silvano Kamara instructions. Added Opticell for exudate management. Atmos air pump already on mattress. Pt is at a mild risk for skin breakdown AEB Jaime. Follow Jaime orders. Specialty Bed Required : yes  [] Low Air Loss   [x] Pressure Redistribution  [] Fluid Immersion  [] Bariatric  [] Total Pressure Relief  [] Other:     Discharge Plan:  Placement for patient upon discharge: tbd  Hospice Care: no  Patient appropriate for Outpatient [address]: follow up with Dr. Rankin Gosselin    Patient/Caregiver Teaching:  Level of patient/caregiver understanding able to:   Voiced understanding regarding wound care.         Electronically signed by Johana Gong RN,  on 6/9/2020 at 12:05 PM

## 2020-06-09 NOTE — PROGRESS NOTES
transfer:   RWW      Functional Mobility:  Patient completes functional mobility from room to therapy gym   Assistance:  Sup/SBA  Device:   [x]   Rolling Walker     []   Standard Walker []   Wheelchair        []   [employer]       []   4-Wheeled Jaspreet Skiff         []   Cardiac Walker       []   Other:          Additional Therapeutic activities/exercises completed this date:     []   ADL Training   [x]   Balance/Postural training patient instructed in standing tolerance activity to bilateral reciprocal patterned movement with min resistance for 5 min first attempt and 3 min second attempt     [x]   Bed/Transfer Training   [x]   Endurance Training   []   Neuromuscular Re-ed   []   Nu-step:  Time:        Level:         #Steps:       []   Rebounder:    []  Seated     []  Standing        []   Supine Ther Ex (reps/sets):     [x]   Seated Ther Ex (reps/sets):  Patient instructed in BUE exercises with red theraband and 1 # dumbbell all planes and all joints minus shoulder flexion over 90 degrees secondary to pain, 2 sets of ten to increase strength and endurance fopr facilitation of ADL and mobility tasks    []   Standing Ther Ex (reps/sets):     []   Other:      Comments:   Patient requiring max encouragement to participate in any activity   Patient attempts to sigrid ortho shoe to L foot patient is Max assist to sigrid ortho shoe with patient stating she does not wear shoes and hasnt for years and only wears skid free socks at home     Patient/Caregiver Education and Training:   [x]   Adaptive Equipment Use  [x]   Bed Mobility/Transfer Technique/Safety   []   Energy Conservation Tips  []   Family training  [x]   Postural Awareness  [x]   Safety During Functional Activities  [x]   Reinforced Patient's Precautions   []   Progress was updated and reviewed in Rehabtracker with patient and/or family this         date.     Treatment Plan for Next Session: POC to continue as tolerated       Assessment:        Treatment/Activity

## 2020-06-09 NOTE — PROGRESS NOTES
Tamar Espinal    : 1931  Acct #: [de-identified]  MRN: [id number]              PM&R Progress Note      Admitting diagnosis: ***    Comorbid diagnoses impacting rehabilitation: ***    Chief complaint: ***    Prior (baseline) level of function: Independent. Current level of function:         Current  IRF-MONTSE and Goals:   Hearing, Speech, and Vision  Expression of Ideas and Wants: Without difficulty  Understanding Verbal and Non-Verbal Content: Understands  Prior Functioning: Everyday Activities  Self Care: Independent  Indoor Mobility (Ambulation): Independent  Stairs: Not applicable  Functional Cognition: Independent  Prior Device Use: Walker    Eating  Assistance Needed: Independent  CARE Score: 6  Discharge Goal: Independent  Oral Hygiene  Assistance Needed: Supervision or touching assistance  Comment: Steady assist for balance, pt felt weak so completed tasks seate din wc for safety. CARE Score: 4  Discharge Goal: Independent  Toileting Hygiene  Assistance Needed: Partial/moderate assistance  Comment: required Min A  CARE Score: 3  Discharge Goal: Independent  Shower/Bathe Self  Assistance Needed: Partial/moderate assistance  Comment: Britt for buttocks; SBA while standing d/t decrased balance  CARE Score: 3  Discharge Goal: Independent  Upper Body Dressing  Assistance Needed: Partial/moderate assistance  Comment: Britt to pull around trunk on R side  CARE Score: 3  Discharge Goal: Independent  Lower Body Dressing  Assistance Needed: Partial/moderate assistance  Comment: ModA to thread LLE and fully don pants d/t decraaed RUE ROM to hike pants  CARE Score: 3  Discharge Goal: Independent  Putting On/Taking Off Footwear  Assistance Needed: Partial/moderate assistance  Comment: ModA; pt able to doff R sock, however needed assist to don sock.   CARE Score: 3  Discharge Goal: Independent    Roll Left and Right  Assistance Needed: Supervision or touching assistance  Comment: required SBA, tasks completed without breastfeeding. General: ***    HEENT: ***    Pulmonary: ***    Cardiac: ***    Abdomen: Patient's abdomen is soft and nondistended. Bowel sounds were present throughout. There was no rebound, guarding or masses noted. Upper extremities: ***    Lower extremities: ***    Sitting balance was ***. Standing balance was ***.     Lab Results   Component Value Date    WBC 8.8 05/31/2020    HGB 9.9 (L) 05/31/2020    HCT 33.9 (L) 05/31/2020    MCV 90.6 05/31/2020     05/31/2020     Lab Results   Component Value Date    INR 1.71 05/27/2020    INR 2.16 02/18/2020    INR 3.03 02/14/2020    PROTIME 20.8 (H) 05/27/2020    PROTIME 26.4 (H) 02/18/2020    PROTIME 37.1 (H) 02/14/2020     Lab Results   Component Value Date    CREATININE 0.8 05/31/2020    BUN 10 05/31/2020     05/31/2020    K 4.3 05/31/2020     05/31/2020    CO2 23 05/31/2020     Lab Results   Component Value Date    ALT 17 05/27/2020    AST 42 (H) 05/27/2020    ALKPHOS 89 05/27/2020    BILITOT 0.4 05/27/2020       Expected length of stay  prior to a supervised level of function for discharge home with a walker and HHC OT/PT is ***    Recommendations:    ***

## 2020-06-09 NOTE — FLOWSHEET NOTE
[x] daily progress note       [x] discharge       Patient Name:  Cheyenne Dominguez   :  1931 MRN: [id number]  Room:  [address] [place] [address] Date of Admission: 2020  Rehabilitation Diagnosis:   Cellulitis of left foot [L03.116]       Date 2020       Day of ARU Week:  4   Time IN/OUT 2546-1862   Individual Tx Minutes 60   TOTAL Tx Time Mins 60   Restrictions Restrictions/Precautions  Restrictions/Precautions: Fall Risk, Up as Tolerated, General Precautions, Weight Bearing(toe-touch weight bearing LLE )      Communication with other providers: [x]   OK to see per nursing:     []   Spoke with team member regarding:      Subjective observations and cognitive status: Pt seen sitting up in recliner at beginning of treatment. Agreeable to therapy. Pain level/location: No complaints of pain during session. Discharge recommendations  Anticipated discharge date: 2020  Destination: []???home alone   []? ??home alone with assist PRN     []? ?? home w/ family      []? ?? Continuous supervision  [x]? ?? SNF    []??? Assisted living     []? ?? Other:  Continued therapy: []???C PT  []???OUTPATIENT  PT   []??? No Further PT  [x]? ??SNF PT  Caregiver training recommended: []? ? ? Yes  [x]? ?? No   Equipment needs: has RW and rollator      Bed Mobility:           [x]   Pt received out of bed   Rolling R/L:  Independent   Scooting:  Independent   Lying --> Sit:  Independent   Sit --> lying:  Independent     Transfers:    Sit--> Stand:  Supervision   Stand --> Sit:   Supervision   Chair-->Bed/Bed --> Chair:   Supervision   Car Transfers:  Supervision   Toilet Transfer (if applicable): Supervision   Assistive device required for transfer:   RW  Pt requires vcs for proper hand placement.      Gait:    Walk 10 feet:  Supervision   Walk 50 feet with 2 turns:  Supervision   Walk 150 feet:  Supervision   Device: RW  Gait Quality:  Reciprocal pattern; vcs to stay closer to walker     Wheelchair Propulsion:  Wheel 50 feet with 2 turns: mod I Wheel 150 feet: mod I   Extremities Used:  BUEs   Type:    [x]  Manual        []  Electric    Stairs   Curb: Supervision   Supportive Device:  RW  Height:   6\"  vcs for proper walker safety and sequencing. Supportive Device:  RW    4 steps:  See other PT note for stair details. 12 steps:  Not attempted d/t safety concerns   Supportive Device:  See other PT note for stair details    Uneven Surfaces:       Assistance:  SBA  Device:   RW  Surfaces Completed:   [x]  Green mat with bean bags beneath      []  Throw rugs       []  Ramp       []  Outdoor pavements        []  Grass             []  Loose gravel        []  Other:     vcs to stay closer to walker     Picking Up Object From Floor (must be standing position):  Assistance: Mod I   [x]   Reacher used    Patient/Caregiver Education and Training:   [x]   Bed Mobility/Transfer technique/safety  [x]   Gait technique/sequencing  [x]   Proper use of assistive device  [x]   Advanced mobility safety and technique  [x]   Reinforced patient's precautions/mobility while maintaining precautions  []   Postural awareness  []   Family training  [x]   Progress was updated in Rehabtracker this date. Treatment Plan for Next Session: pt to discharge from ARU today (6-9-2020)    Assessment:    Treatment/Activity Tolerance:   [x] Tolerated treatment with no adverse effects    [] Patient limited by fatigue  [] Patient limited by pain   [] Patient limited by medical complications:    [] Adverse reaction to Tx:   [] Significant change in status    Safety:       []  bed alarm set    [x]  chair alarm set    []  Pt refused alarms                []  Telesitter activated      [x]  Gait belt used during tx session      [x]other: pt left up in recliner with call light at end of treatment.          Number of Minutes/Billable Intervention  Gait Training 45   Therapeutic Exercise    Neuro Re-Ed    Therapeutic Activity 15   Wheelchair Propulsion    Group    Other:    TOTAL 60         Social

## 2020-06-09 NOTE — PLAN OF CARE
Problem: Falls - Risk of:  Goal: Will remain free from falls  Description: Will remain free from falls  6/9/2020 1151 by Tomas Augustin RN  Outcome: Ongoing  6/8/2020 2153 by Pedro Plascencia LPN  Outcome: Ongoing  Goal: Absence of physical injury  Description: Absence of physical injury  6/9/2020 1151 by Tomas Augustin RN  Outcome: Ongoing  6/8/2020 2153 by Pedro Plascencia LPN  Outcome: Ongoing     Problem: Pain:  Goal: Pain level will decrease  Description: Pain level will decrease  6/9/2020 1151 by Tomas Augustin RN  Outcome: Ongoing  6/8/2020 2153 by Pedro Plascencia LPN  Outcome: Ongoing  Goal: Control of acute pain  Description: Control of acute pain  6/9/2020 1151 by Tomas Augustin RN  Outcome: Ongoing  6/8/2020 2153 by Pedro Plascencia LPN  Outcome: Ongoing  Goal: Control of chronic pain  Description: Control of chronic pain  6/9/2020 1151 by Tomas Augustin RN  Outcome: Ongoing  6/8/2020 2153 by Pedro Plascencia LPN  Outcome: Ongoing  Goal: Patient's pain/discomfort is manageable  Description: Patient's pain/discomfort is manageable  6/9/2020 1151 by Tomas Augustin RN  Outcome: Ongoing  6/8/2020 2153 by Pedro Plascencia LPN  Outcome: Ongoing     Problem: Infection:  Goal: Will remain free from infection  Description: Will remain free from infection  6/9/2020 1151 by Tomas Augustin RN  Outcome: Ongoing  6/8/2020 2153 by Pedro Plascencia LPN  Outcome: Ongoing     Problem: Safety:  Goal: Free from accidental physical injury  Description: Free from accidental physical injury  6/9/2020 1151 by Tomas Augustin RN  Outcome: Ongoing  6/8/2020 2153 by Pedro Plascencia LPN  Outcome: Ongoing  Goal: Free from intentional harm  Description: Free from intentional harm  6/9/2020 1151 by Tomas Augustin RN  Outcome: Ongoing  6/8/2020 2153 by Pedro Plascencia LPN  Outcome: Ongoing     Problem: Daily Care:  Goal: Daily care needs are

## 2020-06-09 NOTE — PROGRESS NOTES
Trachea midline. No LAD. Chest: no distress and CTA. Good air movement. Heart: RRR and no MRG. Abd: soft, non-distended, no tenderness, no hepatomegaly. Normoactive bowel sounds. Ext: no clubbing, cyanosis, see wounds above,   Neuro: Mental status intact. CN 2-12 intact and no focal sensory or motor deficits     Radiologic / Imaging / TESTING  5/27/20 XR Chest Portable:  Impression   No evidence of acute cardiopulmonary disease.       Osteopenia.  A displaced right humeral fracture is not identified.      5/27/20 XR Humerus Right:  Impression   No evidence of acute cardiopulmonary disease.       Osteopenia.  A displaced right humeral fracture is not identified.      5/19/20 MRI Foot Left WO W Contrast:  Impression   1. Deep soft tissue ulceration plantar to the cuboid contiguous with a   loculated collection of pockets of gas and fluid in the adjacent subcutaneous   fat measuring approximately 5.8 x 1.7 x 5.7 cm compatible with an abscess. 2. Midfoot disorganization and degenerative changes compatible with chronic   neuropathic changes.  Moderate marrow edema in the medial cuneiform.  Given   the lack of an adjacent soft tissue ulceration this may represent reactive   osteitis with early osteomyelitis not excluded but less likely. 3. No significant marrow edema adjacent to the soft tissue ulceration which   most closely approaches the cuboid.         Labs:    Recent Results (from the past 24 hour(s))   Urinalysis    Collection Time: 06/08/20  9:55 AM   Result Value Ref Range    Color, UA STRAW (A) YELLOW    Clarity, UA CLEAR CLEAR    Glucose, Urine NEGATIVE NEGATIVE MG/DL    Bilirubin Urine NEGATIVE NEGATIVE MG/DL    Ketones, Urine NEGATIVE NEGATIVE MG/DL    Specific Gravity, UA 1.009 1.001 - 1.035    Blood, Urine NEGATIVE NEGATIVE    pH, Urine 7.0 5.0 - 8.0    Protein, UA NEGATIVE NEGATIVE MG/DL    Urobilinogen, Urine NORMAL 0.2 - 1.0 MG/DL    Nitrite Urine, Quantitative NEGATIVE NEGATIVE    Leukocyte

## 2020-06-09 NOTE — PROGRESS NOTES
required for transfer:  RW and L post-op shoe      Gait:    Distance:  ~12 ft with turns x 2 trials (submax) + 51 ft (max) + 94 ft with turns (max)  Assistance:  SBA   Device:  RW and L post-op shoe   Gait Quality:   Step-through, slow cas, bilat genu valgus, R knee recurvatum on mid and terminal stance      Stairs   # Completed:  5 (max)  Height: 4\"/6\"  Assistance:  CGA   Supportive Device:  bilat rails   Pattern: step-to with LLE leading on ascent and RLE leading on descent     Curb   Height:  4\"  Assistance:  CGA  Device:  RW    Additional Therapeutic activities/exercises completed this date:     []   Nu-step:  Time:        Level:         #Steps:       []   Rebounder:    []  Seated     []  Standing        [x]   Balance training: Pt stood at sink x 4 mins Mod Ind to complete grooming tasks (hand washing and mouth care)            []   Postural training    []   Supine ther ex (reps/sets):     []   Seated ther ex (reps/sets):     []   Standing ther ex (reps/sets):     []   Picking up object from floor (standing):                   []   Reacher used   [x]   Other: Toileting activity completed with SBA     []   Other:   Comments:      Patient/Caregiver Education and Training:   []   Role of PT  []   Education about Dx  []   Use of call light for assist   []   HEP provided and explained   []   Treatment plan reviewed  []   Home safety  []   Wheelchair mobility/management   []   Body mechanics  []   Bed Mobility/Transfer technique  []   Gait technique/sequencing  []   Proper use of assistive device/adaptive equipment  [x]   Stair training/Advanced mobility safety and technique  []   Reinforced patient's precautions/mobility while maintaining precautions  []   Postural awareness  []   Family/caregiver training  [x]   Progress was updated and reviewed in Rehabtracker with patient and/or family this date.   []   Other:    Treatment Plan for Next Session: discharge QI tasks     Assessment:  Pt's ambulation tolerance is inconsistent due to fatigue and R shoulder pain. She continued to require Total A with donning of L post-op shoe at seated level prior to standing activities.         Treatment/Activity Tolerance:   [] Tolerated treatment with no adverse effects    [x] Patient limited by fatigue  [x] Patient limited by pain   [] Patient limited by medical complications:    [] Adverse reaction to Tx:   [] Significant change in status    Barrier/s to progress/learning:   []   None  []   Cognition  []   Hearing deficit  []   Pre-morbid mental/psychological status   []   Motivation  []   Communication  []   Anxiety  []   Vision deficit  []   Attention  [x]   Other: status of L foot wound     Safety:       []  bed alarm set    [x]  chair alarm set    []  Pt refused alarms                []  Telesitter activated      [x]  Gait belt used during tx session      []other:         Number of Minutes/Billable Intervention  Gait Training 30   Therapeutic Exercise    Neuro Re-Ed    Therapeutic Activity 30   Wheelchair Propulsion    Group    Other:    TOTAL 60         Social History  Social/Functional History  Lives With: Alone  Type of Home: House  Home Layout: One level  Home Access: Level entry  Bathroom Shower/Tub: Walk-in shower, Shower chair with back  Bathroom Toilet: Handicap height  Bathroom Equipment: 3-in-1 commode  Bathroom Accessibility: Accessible  Home Equipment: Rolling walker, 4 wheeled walker, Reacher  Receives Help From: Family(Daughter grocery shops for her)  ADL Assistance: Independent  Homemaking Assistance: Independent  Homemaking Responsibilities: Yes  Meal Prep Responsibility: Primary  Laundry Responsibility: Primary  Cleaning Responsibility: Primary  Bill Paying/Finance Responsibility: Primary  Ambulation Assistance: Independent  Transfer Assistance: Independent  Active : No  Occupation: Retired  Type of occupation: Patient worked at [employer] in the [employer] Enjoys being outside, working on her yard, going out with friends  Additional Comments: Pt reports 4 fall within the last week, unsure why     Objective                                                                                    Goals:  (Update in navigator)  Short term goals  Time Frame for Short term goals: 5 tx days:   Short term goal 1: Pt will complete bed mobility and sup<->sit Ind. Short term goal 2: Pt will complete OOB transfers using RW and L post-op shoe Mod Ind. Short term goal 3: Pt will ambulate 150 ft using RW and L post-op shoe with Sup. Short term goal 4: Pt will complete object retrieval from the floor in standing using reacher Mod Ind. Short term goal 5: Pt will consistently propel manual w/c >/=200 ft Mod Ind. :  Long term goals  Time Frame for Long term goals : 10-12 tx days:   Long term goal 1: Pt will ambulate >/=150 ft using RW and L post-op shoe Mod Ind. Long term goal 2: Pt will ascend/descend 4-5 steps using bilat rails and L post-op shoe Mod Ind. Long term goal 3: Pt will ascend/descend curb step and ambulate over uneven surfaces using RW and L post-op shoe Mod Ind. :        Plan of Care                                                                              Times per week: 5 days per week for a minimum of 60 minutes/day plus group as appropriate for 60 minutes.   Treatment to include Current Treatment Recommendations: Strengthening, Balance Training, Transfer Training, Endurance Training, Gait Training, Safety Education & Training, Stair training, Wheelchair Mobility Training, Functional Mobility Training, Home Exercise Program, Patient/Caregiver Education & Training, ROM, Equipment Evaluation, Education, & procurement, Pain Management    Electronically signed by   Steffen Carpio PT   6/9/2020, 8:28 AM

## 2020-06-10 NOTE — PROGRESS NOTES
breastfeeding. General: ***    HEENT: ***    Pulmonary: ***    Cardiac: ***    Abdomen: Patient's abdomen is soft and nondistended. Bowel sounds were present throughout. There was no rebound, guarding or masses noted. Upper extremities: ***    Lower extremities: ***    Sitting balance was ***. Standing balance was ***.     Lab Results   Component Value Date    WBC 8.8 05/31/2020    HGB 9.9 (L) 05/31/2020    HCT 33.9 (L) 05/31/2020    MCV 90.6 05/31/2020     05/31/2020     Lab Results   Component Value Date    INR 1.71 05/27/2020    INR 2.16 02/18/2020    INR 3.03 02/14/2020    PROTIME 20.8 (H) 05/27/2020    PROTIME 26.4 (H) 02/18/2020    PROTIME 37.1 (H) 02/14/2020     Lab Results   Component Value Date    CREATININE 0.8 06/09/2020    BUN 16 06/09/2020     06/09/2020    K 4.4 06/09/2020     06/09/2020    CO2 24 06/09/2020     Lab Results   Component Value Date    ALT 12 06/09/2020    AST 20 06/09/2020    ALKPHOS 92 06/09/2020    BILITOT 0.3 06/09/2020       Expected length of stay  prior to a supervised level of function for discharge home with a walker and HHC OT/PT is ***    Recommendations:    ***

## 2020-06-10 NOTE — PROGRESS NOTES
Infectious Disease Progress Note  6/10/2020   Patient Name: Ariane Maria : 1931   Impression  · Chronic Left Foot Infected Ulcer, Osteomyelitis:  § Afebrile, no leukocytosis  § Blood cultures --NGTD  § Dr. Whitney Burk, general surgery, onboard, -underwent I&D of 6 x 7 cm un-loculated infected necrotic left foot infected ulcer, gram stain/C&S aerobic and anaerobic cultures sent-P. aeruginosa and Gamma strep  · Charcot Foot  · Multi-morbidity: per PMHx:  GERD, Arthritis, diverticulosis, HTN, kidney stones   Plan:  · Continue Zosyn IV 4.5 g q6h plan for 6 weeks (end date 20)  · Follow up in ID clinic 1 week after DC  Weekly labs drawn on Monday during the course of treatment  CBC with differential, CMP, ESR, CRP  Fax results to Attn: [facility]  · # [phone]  · OK to DC from ARU from ID standpoint when ready    Ongoing Antimicrobial Therapy  Zosyn -? Completed Antimicrobial Therapy  Zosyn   Vancomycin   Cefepime , 30-  ? History:? Interval history noted. Chief complaint:  Chronic left foot infected ulcer, possible OM. Denies n/v/d/f or untoward effects of antibiotics. Sitting up in the chair eating lunch, states is continuing to feel well. Physical Exam:  Vital Signs: BP (!) 144/65   Pulse 78   Temp 97.5 °F (36.4 °C) (Oral)   Resp 16   Ht 5' 5\" (1.651 m)   Wt 215 lb (97.5 kg)   SpO2 92%   BMI 35.78 kg/m²     Gen: alert and oriented X3, no distress  Skin: no stigmata of endocarditis  Wounds: left foot with non-pitting edema, plantar ulcer with packing intact, no drainage, erythema or warmth. Left lower leg with chronic discoloration, no warmth. Right foot with s/p 4th and 5th amputations, sites well healed, no erythema, drainage or tenderness. HEMT: AT/NC Oropharynx pink, moist, and without lesions or exudates; dentition in good state of repair  Eyes: PERRLA, EOMI, conjunctiva pink, sclera anicteric. Neck: Supple. Trachea midline.

## 2020-06-24 PROBLEM — L02.612 FOOT ABSCESS, LEFT: Status: ACTIVE | Noted: 2020-01-01

## 2020-06-24 PROBLEM — R19.7 DIARRHEA: Status: ACTIVE | Noted: 2020-01-01

## 2020-06-24 PROBLEM — Z79.2 RECEIVING INTRAVENOUS ANTIBIOTIC TREATMENT AS OUTPATIENT: Status: ACTIVE | Noted: 2020-01-01

## 2020-06-24 NOTE — PROGRESS NOTES
Upper And Lower    Urinary incontinence     Wears dentures     Full Upper    Wears glasses         Past Surgical History:   Procedure Laterality Date    APPENDECTOMY  1941    BLADDER SUSPENSION  1990's    BREAST CYST EXCISION Right 1955    Benign    COLONOSCOPY  Last Done In 2000's    Polpys Removed In Past    DENTAL SURGERY      Teeth Extracted In Past    DILATION AND CURETTAGE OF UTERUS      \"2 Or 3 \" Prior To DUANE In 1970's    FOOT DEBRIDEMENT Left 5/29/2020    FOOT ABSCESS DEBRIDEMENT INCISION AND DRAINAGE LEFT, EXCISIONAL BIOPSY OF THE LEFT FOOT MASS performed by Yu Restrepo MD at [address] Left 7-7-12    Broken Left Femur With Hardware    HYSTERECTOMY, TOTAL ABDOMINAL  1970's    JOINT REPLACEMENT Right 12-2-14    Total Right Knee    OTHER SURGICAL HISTORY  11/23/15    Robotic Assisted Laparoscopic Sacrocolpopexy    OTHER SURGICAL HISTORY Right 09/04/2018    orif right patella    OVARIAN CYST SURGERY Right 1957    \"Right\"    SKIN CANCER EXCISION  2000's    Skin Cancer Excised Back Of Right Leg    TOE AMPUTATION Right 1990's Or 2000's    Right Foot Little Toe    TOE AMPUTATION Right 5-23-13    Second Toe    TONSILLECTOMY  1962    TOTAL HIP ARTHROPLASTY Right 08/21/2016        Social History     Socioeconomic History    Marital status:       Spouse name: Not on file    Number of children: Not on file    Years of education: Not on file    Highest education level: Not on file   Occupational History    Not on file   Social Needs    Financial resource strain: Not on file    Food insecurity     Worry: Not on file     Inability: Not on file    Transportation needs     Medical: Not on file     Non-medical: Not on file   Tobacco Use    Smoking status: Never Smoker    Smokeless tobacco: Never Used   Substance and Sexual Activity    Alcohol use: No    Drug use: No    Sexual activity: Never   Lifestyle    Physical activity     Days per week: Not on file every morning 30 tablet 3     No current facility-administered medications for this visit. Review of Systems   Constitutional: Negative for activity change, chills, diaphoresis and fever. HENT: Negative for sore throat, trouble swallowing and voice change. Eyes: Negative for photophobia and visual disturbance. Respiratory: Negative for cough, shortness of breath and wheezing. Cardiovascular: Negative for chest pain, palpitations and leg swelling. Gastrointestinal: Negative for abdominal pain, anal bleeding, blood in stool, constipation, diarrhea, nausea and vomiting. Endocrine: Negative for cold intolerance, heat intolerance, polydipsia and polyuria. Genitourinary: Negative for dysuria, frequency and hematuria. Musculoskeletal: Negative for joint swelling, myalgias and neck stiffness. Skin: Negative for color change and rash. Neurological: Negative for seizures, speech difficulty, light-headedness and numbness. Hematological: Negative for adenopathy. Does not bruise/bleed easily. OBJECTIVE:    BP (!) 148/78 (Site: Left Upper Arm, Position: Sitting, Cuff Size: Medium Adult)   Pulse 104   Temp 98.2 °F (36.8 °C) (Infrared)   Resp 12   Ht 5' 5\" (1.651 m)   Wt 213 lb (96.6 kg)   SpO2 92%   BMI 35.45 kg/m²    Body mass index is 35.45 kg/m². Physical Exam  Vitals signs reviewed. Constitutional:       General: She is not in acute distress. Appearance: She is well-developed. She is not diaphoretic. HENT:      Head: Normocephalic and atraumatic. Eyes:      General: No scleral icterus. Conjunctiva/sclera: Conjunctivae normal.      Pupils: Pupils are equal, round, and reactive to light. Neck:      Musculoskeletal: Normal range of motion. Thyroid: No thyromegaly. Vascular: No JVD. Trachea: No tracheal deviation. Cardiovascular:      Rate and Rhythm: Normal rate and regular rhythm. Heart sounds: Normal heart sounds. No murmur.  No friction

## 2020-06-24 NOTE — PROGRESS NOTES
6/24/2020         Referring Physician: No ref. provider found  Primary Care Physician: Chio Ybarra MD    Impression/Plan:   Diagnosis Orders   1. Chronic multifocal osteomyelitis of left foot (HCC)  Basic Metabolic Panel   2. Receiving intravenous antibiotic treatment as outpatient  Basic Metabolic Panel    CBC    C-Reactive Protein    Hepatic Function Panel    Sedimentation Rate   3. Diarrhea, unspecified type  Probiotic Acidophilus (FLORANEX) TABS    C DIFF TOXIN/ANTIGEN   4. Charcot's joint of left foot         Discussion:  Pyogenic inflammation of bone (HCC)  Ulcer persists, will need labs to ascertain response. Continue Zosyn through 7/13    Diarrhea  Post-prandial diarrhea, but will need to rule out C diff. Return in about 2 weeks (around 7/8/2020). History: Mario Medel is a 80 y.o.  female presenting today for follow-up. Has a hx  HTN, nephrolithiasin Seen in the hospital in June 2020 for a left foot infected plantar ulcer. S/p I and D on 5/29, wound cx positive for P aeruginosa and gamma streptococcus. Treated with Zosyn, planned duration of 6 weeks with an end-date of 7/13/2020 . Complains of frequent post-prandial watery stools. Denies abdominal pain, fever, chills. Review of Systems   All other systems reviewed and are negative.       Allergies   Allergen Reactions    Percocet [Oxycodone-Acetaminophen]      \"I Get Got Crazy And Mean\"    Phenergan [Promethazine Hcl]      \"Violent Behavior\"      Tape [Adhesive Tape]      \"Redness And Tears The Skin , Paper Tape Is Ok To Use\"       Patient Active Problem List   Diagnosis    Closed fracture of right hip ([facility] [address]    HTN (hypertension)    Vaginal vault prolapse after hysterectomy    Varicose veins of leg with swelling    Venous stasis dermatitis of left lower extremity    Chronic venous hypertension    Lower leg edema    Blister of foot, left    Lymphedema of both lower extremities    Closed displaced fracture of right

## 2020-07-08 NOTE — PROGRESS NOTES
DUANE In 1970's    FOOT DEBRIDEMENT Left 5/29/2020    FOOT ABSCESS DEBRIDEMENT INCISION AND DRAINAGE LEFT, EXCISIONAL BIOPSY OF THE LEFT FOOT MASS performed by Tawnya Acuna MD at [address] Left 7-7-12    Broken Left Femur With Hardware    HYSTERECTOMY, TOTAL ABDOMINAL  1970's    JOINT REPLACEMENT Right 12-2-14    Total Right Knee    OTHER SURGICAL HISTORY  11/23/15    Robotic Assisted Laparoscopic Sacrocolpopexy    OTHER SURGICAL HISTORY Right 09/04/2018    orif right patella    OVARIAN CYST SURGERY Right 1957    \"Right\"    SKIN CANCER EXCISION  2000's    Skin Cancer Excised Back Of Right Leg    TOE AMPUTATION Right 1990's Or 2000's    Right Foot Little Toe    TOE AMPUTATION Right 5-23-13    Second Toe    TONSILLECTOMY  1962    TOTAL HIP ARTHROPLASTY Right 08/21/2016        Social History     Socioeconomic History    Marital status:       Spouse name: Not on file    Number of children: Not on file    Years of education: Not on file    Highest education level: Not on file   Occupational History    Not on file   Social Needs    Financial resource strain: Not on file    Food insecurity     Worry: Not on file     Inability: Not on file    Transportation needs     Medical: Not on file     Non-medical: Not on file   Tobacco Use    Smoking status: Never Smoker    Smokeless tobacco: Never Used   Substance and Sexual Activity    Alcohol use: No    Drug use: No    Sexual activity: Never   Lifestyle    Physical activity     Days per week: Not on file     Minutes per session: Not on file    Stress: Not on file   Relationships    Social connections     Talks on phone: Not on file     Gets together: Not on file     Attends Taoism service: Not on file     Active member of club or organization: Not on file     Attends meetings of clubs or organizations: Not on file     Relationship status: Not on file    Intimate partner violence     Fear of current or ex partner: Not on file     Emotionally abused: Not on file     Physically abused: Not on file     Forced sexual activity: Not on file   Other Topics Concern    Not on file   Social History Narrative    Not on file        Allergies   Allergen Reactions    Percocet [Oxycodone-Acetaminophen]      \"I Get Got Crazy And Mean\"    Phenergan [Promethazine Hcl]      \"Violent Behavior\"      Tape Aretha Craft Tape]      \"Redness And Tears The Skin , Paper Tape Is Ok To Use\"        The patient has a family history of    Current Outpatient Medications   Medication Sig Dispense Refill    furosemide (LASIX) 20 MG tablet Take 20 mg by mouth daily      piperacillin-tazobactam (ZOSYN) 3-0.375 GM per 50ML IVPB extended infusion Infuse 3.375 g intravenously every 6 hours      hydrochlorothiazide (HYDRODIURIL) 12.5 MG tablet Take 12.5 mg by mouth daily      Probiotic Acidophilus (FLORANEX) TABS Take 1 tablet by mouth 3 times daily 90 tablet 0    lactobacillus (CULTURELLE) capsule Take 1 capsule by mouth daily      hydrogen peroxide 3 % external solution Apply topically as needed. 1 Bottle 0    amLODIPine (NORVASC) 5 MG tablet Take 1 tablet by mouth daily 30 tablet 0    sennosides-docusate sodium (SENOKOT-S) 8.6-50 MG tablet Take 1 tablet by mouth daily      pregabalin (LYRICA) 150 MG capsule Take 1 capsule by mouth 2 times daily. 60 capsule 0    acetaminophen (TYLENOL) 500 MG tablet Take 2 tablets by mouth every 6 hours as needed for Pain or Fever 120 tablet 3    pantoprazole (PROTONIX) 40 MG tablet Take 1 tablet by mouth every morning 30 tablet 3     No current facility-administered medications for this visit. Review of Systems      OBJECTIVE:    BP (!) 158/68 (Site: Left Upper Arm, Position: Sitting, Cuff Size: Medium Adult)   Pulse 84   Temp 98.4 °F (36.9 °C) (Infrared)   Resp 16   Ht 5' 5\" (1.651 m)   Wt 213 lb (96.6 kg)   SpO2 97%   BMI 35.45 kg/m²    Body mass index is 35.45 kg/m².     Physical Exam    ASSESSMENT & PLAN:    1. Cellulitis of left foot           Wound very nicely healing, no erythema, no induration, no purulent discharge BUT deep 3 cm x 3 cm x 3 cm. Continue wound care and it should heal fine. Patient counseled on the risks, benefits, and alternatives of treatment plan at length while in the office today. Patient states an understanding and willingness to proceed with the plan. Follow Up:  Return in about 4 weeks (around 8/5/2020) for Post operative check, For wound recheck.       Herrera Lopez MD, FACS, FICS.    7/8/20

## 2020-07-15 NOTE — TELEPHONE ENCOUNTER
Palak Miramontes from [employer] called wanting something in writing stating the Ms Anuj Garcia can put weight on that foot. I addended and added that information per Dr Joaquin Russell, she is able to put weight on that foot. I faxed this information to [employer] at [phone].

## 2020-07-15 NOTE — PROGRESS NOTES
GENERAL SURGERY OFFICE NOTE    SUBJECTIVE:    Patient presenting today referred from Alyssa Wall MD and No ref. provider found, for   Chief Complaint   Patient presents with    Follow-up     Left Foot Edema, I&D 5/29/20        HPI: Ivett Wood is a 80 y.o. female presenting here with Edema to her left foot. She had I&D to that foot on 5/29/20. Leg is sore. Denies tenderness below the ankle. I was called by her care taker concerned about the wound change??, NO change whatso ever, nicely healing, deep and will take time, but path benign, and continue wound care. Wounds very nicely healing, no erythema, no induration, no purulent discharge. No constipation, BMs back to normal.    Thoroughly reviewed the patient's medical history, family history, social history and review of systems with the patient today in the office. Please see medical record for pertinent positives.       Past Medical History:   Diagnosis Date    Acid reflux     Anesthesia     \"Combative After Anesthesia\"    Arthritis     \"All Over My Body\"    Diverticulosis     HTN (hypertension)     Hx of blood clots 2000's    \"Behind Right Knee\"    Kidney stones     Passed Kidney Stones In 1970's    Lower back pain     \"Sometimes\"    Neuropathy     bilateral feet    Osteoporosis     IV Reclast Once A Year, Last Dose Received In 2014    Purpura ([address]     \"Arms\"    Shingles     \"Twice, First Time  In 1972 Left Breast Area, Second Time In 2000 Right Lower Abdomen\"    Skin Cancer Excised Back Of Right Leg 2000's    Teeth missing     Upper And Lower    Urinary incontinence     Wears dentures     Full Upper    Wears glasses         Past Surgical History:   Procedure Laterality Date    APPENDECTOMY  1941    BLADDER SUSPENSION  1990's    BREAST CYST EXCISION Right 1955    Benign    COLONOSCOPY  Last Done In 2000's    Polpys Removed In Past    DENTAL SURGERY      Teeth Extracted In Past    DILATION AND CURETTAGE OF UTERUS      \"2 Or 3 \" Prior To DUANE In 1970's    FOOT DEBRIDEMENT Left 5/29/2020    FOOT ABSCESS DEBRIDEMENT INCISION AND DRAINAGE LEFT, EXCISIONAL BIOPSY OF THE LEFT FOOT MASS performed by Archana Ansari MD at [address] Left 7-7-12    Broken Left Femur With Hardware    HYSTERECTOMY, TOTAL ABDOMINAL  1970's    JOINT REPLACEMENT Right 12-2-14    Total Right Knee    OTHER SURGICAL HISTORY  11/23/15    Robotic Assisted Laparoscopic Sacrocolpopexy    OTHER SURGICAL HISTORY Right 09/04/2018    orif right patella    OVARIAN CYST SURGERY Right 1957    \"Right\"    SKIN CANCER EXCISION  2000's    Skin Cancer Excised Back Of Right Leg    TOE AMPUTATION Right 1990's Or 2000's    Right Foot Little Toe    TOE AMPUTATION Right 5-23-13    Second Toe    TONSILLECTOMY  1962    TOTAL HIP ARTHROPLASTY Right 08/21/2016        Social History     Socioeconomic History    Marital status:       Spouse name: Not on file    Number of children: Not on file    Years of education: Not on file    Highest education level: Not on file   Occupational History    Not on file   Social Needs    Financial resource strain: Not on file    Food insecurity     Worry: Not on file     Inability: Not on file    Transportation needs     Medical: Not on file     Non-medical: Not on file   Tobacco Use    Smoking status: Never Smoker    Smokeless tobacco: Never Used   Substance and Sexual Activity    Alcohol use: No    Drug use: No    Sexual activity: Never   Lifestyle    Physical activity     Days per week: Not on file     Minutes per session: Not on file    Stress: Not on file   Relationships    Social connections     Talks on phone: Not on file     Gets together: Not on file     Attends Faith service: Not on file     Active member of club or organization: Not on file     Attends meetings of clubs or organizations: Not on file     Relationship status: Not on file    Intimate partner violence     Fear of current or ex partner: Not on file     Emotionally abused: Not on file     Physically abused: Not on file     Forced sexual activity: Not on file   Other Topics Concern    Not on file   Social History Narrative    Not on file        Allergies   Allergen Reactions    Percocet [Oxycodone-Acetaminophen]      \"I Get Got Crazy And Mean\"    Phenergan [Promethazine Hcl]      \"Violent Behavior\"      Tape Gae Chalet Tape]      \"Redness And Tears The Skin , Paper Tape Is Ok To Use\"        The patient has a family history of    Current Outpatient Medications   Medication Sig Dispense Refill    furosemide (LASIX) 20 MG tablet Take 20 mg by mouth daily      piperacillin-tazobactam (ZOSYN) 3-0.375 GM per 50ML IVPB extended infusion Infuse 3.375 g intravenously every 6 hours      hydrochlorothiazide (HYDRODIURIL) 12.5 MG tablet Take 12.5 mg by mouth daily      Probiotic Acidophilus (FLORANEX) TABS Take 1 tablet by mouth 3 times daily 90 tablet 0    lactobacillus (CULTURELLE) capsule Take 1 capsule by mouth daily      hydrogen peroxide 3 % external solution Apply topically as needed. 1 Bottle 0    amLODIPine (NORVASC) 5 MG tablet Take 1 tablet by mouth daily 30 tablet 0    sennosides-docusate sodium (SENOKOT-S) 8.6-50 MG tablet Take 1 tablet by mouth daily      pregabalin (LYRICA) 150 MG capsule Take 1 capsule by mouth 2 times daily. 60 capsule 0    acetaminophen (TYLENOL) 500 MG tablet Take 2 tablets by mouth every 6 hours as needed for Pain or Fever 120 tablet 3    pantoprazole (PROTONIX) 40 MG tablet Take 1 tablet by mouth every morning 30 tablet 3     No current facility-administered medications for this visit. Review of Systems   Constitutional: Negative for activity change, chills, diaphoresis and fever. HENT: Negative for sore throat, trouble swallowing and voice change. Eyes: Negative for photophobia and visual disturbance. Respiratory: Negative for cough, shortness of breath and wheezing. Cardiovascular: Negative for chest pain, palpitations and leg swelling. Gastrointestinal: Negative for abdominal pain, anal bleeding, blood in stool, constipation, diarrhea, nausea and vomiting. Endocrine: Negative for cold intolerance, heat intolerance, polydipsia and polyuria. Genitourinary: Negative for dysuria, frequency and hematuria. Musculoskeletal: Positive for joint swelling. Negative for myalgias and neck stiffness. Skin: Negative for color change and rash. Neurological: Negative for seizures, speech difficulty, light-headedness and numbness. Hematological: Negative for adenopathy. Does not bruise/bleed easily. OBJECTIVE:    BP (!) 142/76 (Site: Left Upper Arm, Position: Sitting, Cuff Size: Large Adult)   Pulse 77   Temp 98.2 °F (36.8 °C) (Infrared)   Ht 5' 5\" (1.651 m)   Wt 219 lb (99.3 kg)   SpO2 97%   BMI 36.44 kg/m²    Body mass index is 36.44 kg/m². Physical Exam  Vitals signs reviewed. Constitutional:       General: She is not in acute distress. Appearance: She is well-developed. She is not diaphoretic. HENT:      Head: Normocephalic and atraumatic. Eyes:      General: No scleral icterus. Conjunctiva/sclera: Conjunctivae normal.      Pupils: Pupils are equal, round, and reactive to light. Neck:      Musculoskeletal: Normal range of motion. Thyroid: No thyromegaly. Vascular: No JVD. Trachea: No tracheal deviation. Cardiovascular:      Rate and Rhythm: Normal rate and regular rhythm. Heart sounds: Normal heart sounds. No murmur. No friction rub. No gallop. Pulmonary:      Effort: Pulmonary effort is normal. No respiratory distress. Breath sounds: No stridor. No wheezing or rales. Chest:      Chest wall: No tenderness. Abdominal:      General: Bowel sounds are normal. There is no distension. Palpations: Abdomen is soft. There is no mass. Tenderness: There is no abdominal tenderness.  There is no guarding or rebound. Musculoskeletal: Normal range of motion. General: No tenderness. Feet:    Lymphadenopathy:      Cervical: No cervical adenopathy. Skin:     General: Skin is warm and dry. Coloration: Skin is not pale. Findings: No erythema or rash. Neurological:      Mental Status: She is alert and oriented to person, place, and time. Cranial Nerves: No cranial nerve deficit. Coordination: Coordination normal.   Psychiatric:         Behavior: Behavior normal.         Thought Content: Thought content normal.         Judgment: Judgment normal.         ASSESSMENT & PLAN:    1. Cellulitis of left foot    2. Foot abscess, left    3. Ischemic ulcer of left heel with fat layer exposed ([facility] [address]           I was called by her care taker concerned about the wound change??, NO change whatso ever, nicely healing, deep and will take time, but path benign, and continue wound care. Patient counseled on the risks, benefits, and alternatives of treatment plan at length while in the office today. Patient states an understanding and willingness to proceed with the plan. Follow Up:  Return in about 4 weeks (around 8/12/2020) for For wound recheck, Follow up Symptoms. Kayleigh Jain MD, FACS, FICS.     7/15/20

## 2020-07-16 NOTE — DISCHARGE SUMMARY
Independent  Toileting Hygiene  Assistance Needed: Supervision or touching assistance  Comment: required Min A  CARE Score: 4  Discharge Goal: Independent  Shower/Bathe Self  Assistance Needed: Supervision or touching assistance  Comment: Britt for buttocks; SBA while standing d/t decrased balance  CARE Score: 4  Discharge Goal: Independent  Upper Body Dressing  Assistance Needed: Independent  Comment: Britt to pull around trunk on R side  CARE Score: 6  Discharge Goal: Independent  Lower Body Dressing  Assistance Needed: Supervision or touching assistance  Comment: ModA to thread LLE and fully don pants d/t decraaed RUE ROM to hike pants  CARE Score: 4  Discharge Goal: Independent  Putting On/Taking Off Footwear  Assistance Needed: Partial/moderate assistance  Comment: patient requires Max A to sigrid sock and ortho boot over L foot and requires Sup for using sock aide to R foot for vc to properly place sock on sock aide   CARE Score: 3  Discharge Goal: Independent    Roll Left and Right  Assistance Needed: Independent  Comment: required SBA, tasks completed without use of bed features; increased time and effort observed to complete rolling to L due to R shoulder pain   CARE Score: 6  Discharge Goal: Independent  Sit to Lying  Assistance Needed: Independent  Comment: required SBA, transfer was completed without use of bed features, pt initiated problem solving to self-assist RLE  CARE Score: 6  Discharge Goal: Independent  Sit to Stand  Assistance Needed: Supervision or touching assistance  Comment: required CGA using RW   CARE Score: 4  Discharge Goal: Independent  Chair/Bed-to-Chair Transfer  Assistance Needed: Supervision or touching assistance  Comment: required Min A to sit due to decreased eccentric control   CARE Score: 4  Discharge Goal: Independent  Toilet Transfer  Assistance Needed: Supervision or touching assistance  Comment: required CGA using grab bars   CARE Score: 4  Discharge Goal: Independent  Car Transfer  Assistance Needed: Supervision or touching assistance  Comment: required CGA   CARE Score: 4  Discharge Goal: Independent   Walk 10 Feet? Walk 10 Feet?: Yes  1 Step  1 Step?: Yes  Reason if not Attempted: Not attempted due to environmental limitations  Picking Up Object  Assistance Needed: Independent  Comment: required CGA using reacher (pt used adaptive equipment at baseline)   Reason if not Attempted: Not attempted due to medical condition or safety concerns  CARE Score: 6  Discharge Goal: Independent  Wheelchair Ability  Uses a Wheelchair and/or Scooter?: Yes  Wheel 50 Feet with Two Turns  Assistance Needed: Independent  CARE Score: 6  Discharge Goal: Independent  Wheel 150 Feet  Assistance Needed: Independent  Comment: pt was able to propel up to 155 ft   CARE Score: 6  Discharge Goal: Independent            I      Exam:    Blood pressure (!) 149/70, pulse 87, temperature 97.7 °F (36.5 °C), resp. rate 18, height 5' 5\" (1.651 m), weight 215 lb (97.5 kg), SpO2 94 %, not currently breastfeeding. General: Up in a wheelchair. Alert and oriented x2. Poor insight. Follows directions. HEENT: MMM. Neck supple. No adenopathy. Pulmonary: Unlabored breathing with no rales or rhonchi. Cardiac: Regular rate and rhythm. Abdomen: Patient's abdomen is soft and nondistended. Bowel sounds were present throughout. There was no rebound, guarding or masses noted. Upper extremities: Trouble raising her right arm at the shoulder. Passively she can get it up and then she can hold it. Resisted external rotation is well-maintained. Persistent tenderness over the subdeltoid bursa. Lower extremities: Left foot dressed. No drainage or swelling. .  Calf soft. Sitting balance was good. Standing balance was poor.     Lab Results   Component Value Date    WBC 8.4 07/03/2020    HGB 10.2 (A) 07/03/2020    HCT 33.1 (A) 07/03/2020    MCV 82.6 07/03/2020     07/03/2020     Lab Results

## 2020-07-31 PROBLEM — S91.302A OPEN WOUND OF LEFT FOOT: Status: ACTIVE | Noted: 2020-01-01

## 2020-07-31 NOTE — PROGRESS NOTES
GENERAL SURGERY OFFICE NOTE    SUBJECTIVE:    Patient presenting today referred from Armin Green MD and No ref. provider found, for No chief complaint on file. HPI: Benjamin Valenzuela is a 80 y.o. female presenting with concern re the X-ray revealing the ? Fracture in the proximal left 2 nd toe base, CLINICALYY irrelevant, Pt asymptomatic, will repeat the X-ray in 6-8 wks and follow, taping the toe to the great toe, is adequate. The base of sole Wound is very nicely healing, no erythema, no induration, no purulent discharge. No constipation, BMs back to normal.    Thoroughly reviewed the patient's medical history, family history, social history and review of systems with the patient today in the office. Please see medical record for pertinent positives.       Past Medical History:   Diagnosis Date    Acid reflux     Anesthesia     \"Combative After Anesthesia\"    Arthritis     \"All Over My Body\"    Diverticulosis     HTN (hypertension)     Hx of blood clots 2000's    \"Behind Right Knee\"    Kidney stones     Passed Kidney Stones In 1970's    Lower back pain     \"Sometimes\"    Neuropathy     bilateral feet    Osteoporosis     IV Reclast Once A Year, Last Dose Received In 2014    Purpura ([address]     \"Arms\"    Shingles     \"Twice, First Time  In 1972 Left Breast Area, Second Time In 2000 Right Lower Abdomen\"    Skin Cancer Excised Back Of Right Leg 2000's    Teeth missing     Upper And Lower    Urinary incontinence     Wears dentures     Full Upper    Wears glasses         Past Surgical History:   Procedure Laterality Date    APPENDECTOMY  1941    BLADDER SUSPENSION  1990's    BREAST CYST EXCISION Right 1955    Benign    COLONOSCOPY  Last Done In 2000's    Polpys Removed In Past    DENTAL SURGERY      Teeth Extracted In Past    DILATION AND CURETTAGE OF UTERUS      \"2 Or 3 \" Prior To DUANE In 1970's    FOOT DEBRIDEMENT Left 5/29/2020    FOOT ABSCESS DEBRIDEMENT INCISION AND DRAINAGE LEFT, on file   Other Topics Concern    Not on file   Social History Narrative    Not on file        Allergies   Allergen Reactions    Percocet [Oxycodone-Acetaminophen]      \"I Get Got Crazy And Mean\"    Phenergan [Promethazine Hcl]      \"Violent Behavior\"      Tape Maris Bamberger Tape]      \"Redness And Tears The Skin , Paper Tape Is Ok To Use\"        The patient has a family history of    Current Outpatient Medications   Medication Sig Dispense Refill    furosemide (LASIX) 20 MG tablet Take 20 mg by mouth daily      piperacillin-tazobactam (ZOSYN) 3-0.375 GM per 50ML IVPB extended infusion Infuse 3.375 g intravenously every 6 hours      hydrochlorothiazide (HYDRODIURIL) 12.5 MG tablet Take 12.5 mg by mouth daily      lactobacillus (CULTURELLE) capsule Take 1 capsule by mouth daily      hydrogen peroxide 3 % external solution Apply topically as needed. 1 Bottle 0    amLODIPine (NORVASC) 5 MG tablet Take 1 tablet by mouth daily 30 tablet 0    sennosides-docusate sodium (SENOKOT-S) 8.6-50 MG tablet Take 1 tablet by mouth daily      pregabalin (LYRICA) 150 MG capsule Take 1 capsule by mouth 2 times daily. 60 capsule 0    acetaminophen (TYLENOL) 500 MG tablet Take 2 tablets by mouth every 6 hours as needed for Pain or Fever 120 tablet 3    pantoprazole (PROTONIX) 40 MG tablet Take 1 tablet by mouth every morning 30 tablet 3     No current facility-administered medications for this visit. Review of Systems   Constitutional: Negative for activity change, chills, diaphoresis and fever. HENT: Negative for sore throat, trouble swallowing and voice change. Eyes: Negative for photophobia and visual disturbance. Respiratory: Negative for cough, shortness of breath and wheezing. Cardiovascular: Negative for chest pain, palpitations and leg swelling. Gastrointestinal: Negative for abdominal pain, anal bleeding, blood in stool, constipation, diarrhea, nausea and vomiting.    Endocrine: Negative for cranial nerve deficit. Coordination: Coordination normal.   Psychiatric:         Behavior: Behavior normal.         Thought Content: Thought content normal.         Judgment: Judgment normal.           No orders of the defined types were placed in this encounter. Orders Placed This Encounter   Procedures    XR FOOT LEFT (MIN 3 VIEWS)        ASSESSMENT & PLAN:    1. Open wound of left foot, subsequent encounter    2. Closed fracture of phalanx of left second toe, sequela         Samantha Gray is a 80 y.o. female presenting with concern re the X-ray revealing the ? Fracture in the proximal left 2 nd toe base, CLINICALYY irrelevant, Pt asymptomatic, will repeat the X-ray in 6-8 wks and follow, taping the toe to the great toe, is adequate. The base of sole Wound is very nicely healing, no erythema, no induration, no purulent discharge. Patient counseled on the risks, benefits, and alternatives of treatment plan at length while in the office today. Patient states an understanding and willingness to proceed with the plan. Follow Up:  Return in about 2 months (around 9/30/2020) for For imaging and tests results review, For wound recheck. Micheline Sever, MD, FACS, FICS.     7/31/20

## 2020-09-30 NOTE — TELEPHONE ENCOUNTER
Called [facility] to schedule 2 month F/U appt to check Ami's foot. Also a xray of Ami's left foot will need to be done before appt. Nurse was unavailable, gave message for call back.   Order is already placed for Xray

## 2020-09-30 NOTE — TELEPHONE ENCOUNTER
Nurse called back made appointment for end of October per nurse request and they will do xray at [facility] and bring images to appointment

## 2020-10-12 PROBLEM — U07.1 PNEUMONIA DUE TO COVID-19 VIRUS: Status: ACTIVE | Noted: 2020-01-01

## 2020-10-12 PROBLEM — J12.82 PNEUMONIA DUE TO COVID-19 VIRUS: Status: ACTIVE | Noted: 2020-01-01

## 2020-10-12 NOTE — PROGRESS NOTES
Spoke with patient's primary decision maker Vipin Huitron who wishes to keep patient a DNR-CC. Updated on patient's current condition. Copy of DNR order from nursing home transfer in chart.

## 2020-10-12 NOTE — H&P
History and Physical      Name:  Austyn Irvin /Age/Sex: 1931  (80 y.o. female)   MRN & CSN:  [id number] & [id number] Admission Date/Time: 10/12/2020  2:15 AM   Location:  01/01TR-01 PCP: Amol Odell MD       Hospital Day: 1    Assessment and Plan:   Austyn Irvin is a 80 y.o.  female  who presents with:     Acute respiratory failure secondary to viral/covid pneumonia   Chronic heel ulcer   Generalized weakness   HTN, controlled   Hx dvt   Obesity with BMI of 34    Admit to covid unit  Begin oxygen support  Iv steroids, remdesivir  Am labs including lfts  Defer consulting ID to day team     Diet No diet orders on file   DVT Prophylaxis [] Lovenox, []  Heparin, [] SCDs, [] No VTE prophylaxis, patient ambulating   GI Prophylaxis [] PPI, [] H2 Blocker, [] No GI prophylaxis, patient is receiving diet/Tube Feeds   Code Status Prior   Disposition Patient requires continued admission due to oxygen support    Dc plan unknown at this time   MDM [] Low, [x] Moderate,[]  High  Patient's risk as above due to     [x] One or more chronic illnesses with mild exacerbation progression    [x] Two or more stable chronic illnesses    [] Undiagnosed new problem with uncertain prognosis    [] Elective major surgery    []Prescription drug management     History of Present Illness:     Chief Complaint:   Austyn Irvin is a 80 y.o.  female  who presents with progressively worsening sob without associated productive cough. The patient was brought in by EMS from [facility]. EMS reports she was hypoxic in the 60s that was somewhat better with a NRB. w/w activity. Associated with anxiety and some confusion. No radiation of the symptoms.       10-14 point ROS reviewed negative, unless as noted above    Objective:   No intake or output data in the 24 hours ending 10/12/20 0452   Vitals:   Vitals:    10/12/20 0219   BP: (!) 177/79   Pulse: 88   Resp: 13   Temp: 97.9 °F (36.6 °C)   SpO2: (!) 89%     Physical Exam:    GEN Awake female, laying in bed in no apparent distress. Appears given age. EYES Pupils are equally round. No scleral discharge  HENT Atraumatic and symmetric head  NECK No apparent thyromegaly  RESP Symmetric chest movement while on room air. Wheezing and rhonchi  CARDIO/VASC Peripheral pulses equal bilaterally and palpable. No peripheral edema. GI Abdomen is not distended. Rectal exam deferred.  Aguilera catheter is not present. HEME/LYMPH No petechiae or ecchymoses. MSK Spontaneous movement of BL upper extremities  SKIN Normal coloration, warm, dry. NEURO Cranial nerves appear grossly intact  PSYCH Awake, alert. oriented  Past Medical History: PMHx:   Past Medical History:   Diagnosis Date    Acid reflux     Anesthesia     \"Combative After Anesthesia\"    Arthritis     \"All Over My Body\"    Diverticulosis     HTN (hypertension)     Hx of blood clots 2000's    \"Behind Right Knee\"    Kidney stones     Passed Kidney Stones In 1970's    Lower back pain     \"Sometimes\"    Neuropathy     bilateral feet    Osteoporosis     IV Reclast Once A Year, Last Dose Received In 2014    Purpura ([address]     \"Arms\"    Shingles     \"Twice, First Time  In 1972 Left Breast Area, Second Time In 2000 Right Lower Abdomen\"    Skin Cancer Excised Back Of Right Leg 2000's    Teeth missing     Upper And Lower    Urinary incontinence     Wears dentures     Full Upper    Wears glasses      PSHx:  has a past surgical history that includes bladder suspension (1990's); Toe amputation (Right, 1990's Or 2000's); Toe amputation (Right, 5-23-13); Colonoscopy (Last Done In 2000's); Skin cancer excision (2000's); Dental surgery; Appendectomy (1941); joint replacement (Right, 12-2-14); Tonsillectomy (1962); Hysterectomy, total abdominal (1970's); Dilation and curettage of uterus; Ovarian cyst surgery (Right, 1957); fracture surgery (Left, 7-7-12); Breast cyst excision (Right, 1955); other surgical history (11/23/15);  Total hip arthroplasty (Right, 08/21/2016); other surgical history (Right, 09/04/2018); and Foot Debridement (Left, 5/29/2020). Allergies: Allergies   Allergen Reactions    Percocet [Oxycodone-Acetaminophen]      \"I Get Got Crazy And Mean\"    Phenergan [Promethazine Hcl]      \"Violent Behavior\"      Tape [Adhesive Tape]      \"Redness And Tears The Skin , Paper Tape Is Ok To Use\"     Home Medications:   Prior to Admission medications    Medication Sig Start Date End Date Taking? Authorizing Provider   furosemide (LASIX) 20 MG tablet Take 20 mg by mouth daily    Historical Provider, MD   piperacillin-tazobactam (ZOSYN) 3-0.375 GM per 50ML IVPB extended infusion Infuse 3.375 g intravenously every 6 hours    Historical Provider, MD   hydrochlorothiazide (HYDRODIURIL) 12.5 MG tablet Take 12.5 mg by mouth daily    Historical Provider, MD   lactobacillus (CULTURELLE) capsule Take 1 capsule by mouth daily 6/11/20   NAYA Sebastian MD   hydrogen peroxide 3 % external solution Apply topically as needed. 6/10/20   Antoine Osorio MD   amLODIPine (NORVASC) 5 MG tablet Take 1 tablet by mouth daily 6/11/20   NAYA Sebastian MD   sennosides-docusate sodium (SENOKOT-S) 8.6-50 MG tablet Take 1 tablet by mouth daily 6/11/20   NAYA Sebastian MD   pregabalin (LYRICA) 150 MG capsule Take 1 capsule by mouth 2 times daily. 2/3/20 10/29/22  CHUCK Alvarez - CNP   acetaminophen (TYLENOL) 500 MG tablet Take 2 tablets by mouth every 6 hours as needed for Pain or Fever 8/24/16   Brett Kirkland MD   pantoprazole (PROTONIX) 40 MG tablet Take 1 tablet by mouth every morning 8/24/16   Brett Kirkland MD     FHx: family history includes Cancer in her brother; Diabetes in her brother, brother, brother, and son; Early Death (age of onset: 2) in her sister; Early Death (age of onset: 44) in her father; Early Death (age of onset: 46) in her brother; Heart Disease in her brother, mother, and sister; Kidney Disease in her brother and mother.   SHx:   Social

## 2020-10-12 NOTE — ED NOTES
Bed: 01TR-01  Expected date:   Expected time:   Means of arrival:   Comments:  EMS Resp distress.      Nellie Case RN  10/12/20 0633

## 2020-10-12 NOTE — CONSULTS
Infectious Disease Consult Note  10/12/2020   Patient Name: Tomas Thompson : 1931   Impression   COVID-19 pneumonia with acute respiratory failure  o ?bacterial superinfection   Encephalopathy   Left foot plantar ulcer.  Multi-morbidity: per PMHx  Plan:   Therapeutic: on vancomycin, cefepime. On remdesivir and dexamethasone   Diagnostic: trend CRP, pct   F/u test:     Thank you for allowing me to consult in the care of this patient.  ------------------------  REASON FOR CONSULT: Infective syndrome   Requested by: Dr. Jaimie Turcios. Richard Amaya is a 80 y.o.  female   [facility] resident with hypertension, diverticulosis. Known to me for previous admission for left foot infected plantar ulcer. Culture positive for pseudomonas aeruginosa and gamma Streptococcus. Completed a 6-week course of Zosyn on 2020. Who was admitted 10/12/2020 for further evaluation and management of COVID-19 pneumonia. The patient had previously tested positive for SARS-CoV-2. Received a clinical diagnosis of COVID-19 pneumonia, with a suspicion of bacterial pneumonia vancomycin and cefepime was started. She also received dexamethasone and methylprednisolone. ? Infectious diseases service was consulted to evaluate the pt, and recommend further investigative and therapeutic measures. Review and summary of old records:  ROS: Other systems reviewed Including eyes, ENT, respiratory, cardiovascular, GI, , dermatologic, neurologic, psych, hem/lymphatic, musculoskeletal and endocrine were negative other than what is mentioned above.    Unable to obtain  Patient Active Problem List    Diagnosis Date Noted    Pneumonia due to COVID-19 virus 10/12/2020    Open wound of left foot 2020    Foot abscess, left 2020    Receiving intravenous antibiotic treatment as outpatient 2020    Diarrhea 2020    [address]ogenic inflammation of [address] [address]     Pseudomonas infection     Uncontrolled pain     Generalized weakness     Gait disturbance     Charcot's joint of left foot     Ischemic ulcer of left heel with fat layer exposed ([address]     Cellulitis of left foot 05/30/2020    Cellulitis 05/27/2020    Fall (on)(from) incline, initial encounter 02/03/2020    Acute kidney injury ([address] 02/02/2020    Frequent falls 02/02/2020    Morbid obesity due to excess calories ([address] 02/02/2020    Fall at home, subsequent encounter 02/02/2020    Left skew foot deformity 01/04/2020    Postural dizziness with near syncope 01/04/2020    Fall at home, sequela 01/04/2020    Closed displaced fracture of right patella 09/02/2018    Fall at home 09/02/2018    Lymphedema of both lower extremities 06/23/2017    Lower leg edema 08/24/2016    Blister of foot, left 08/24/2016    Varicose veins of leg with swelling 08/13/2016    Venous stasis dermatitis of left lower extremity 08/13/2016    Chronic venous hypertension 08/13/2016    Vaginal vault prolapse after hysterectomy 11/23/2015    Closed fracture of right hip ([address] 07/07/2012    HTN (hypertension) 07/07/2012     Past Medical History:   Diagnosis Date    Acid reflux     Anesthesia     \"Combative After Anesthesia\"    Arthritis     \"All Over My Body\"    Diverticulosis     HTN (hypertension)     Hx of blood clots 2000's    \"Behind Right Knee\"    Kidney stones     Passed Kidney Stones In 1970's    Lower back pain     \"Sometimes\"    Neuropathy     bilateral feet    Osteoporosis     IV Reclast Once A Year, Last Dose Received In 2014    Purpura ([address]     \"Arms\"    Shingles     \"Twice, First Time  In 1972 Left Breast Area, Second Time In 2000 Right Lower Abdomen\"    Skin Cancer Excised Back Of Right Leg 2000's    Teeth missing     Upper And Lower    Urinary incontinence     Wears dentures     Full Upper    Wears glasses       Past Surgical History:   Procedure Laterality Date    APPENDECTOMY  1941    BLADDER SUSPENSION  1990's    BREAST CYST EXCISION Right 1955    Benign    COLONOSCOPY  Last Done In 2000's    Polpys Removed In Past    DENTAL SURGERY      Teeth Extracted In Past    DILATION AND CURETTAGE OF UTERUS      \"2 Or 3 \" Prior To DUANE In 1970's    FOOT DEBRIDEMENT Left 5/29/2020    FOOT ABSCESS DEBRIDEMENT INCISION AND DRAINAGE LEFT, EXCISIONAL BIOPSY OF THE LEFT FOOT MASS performed by Dola Galeazzi, MD at [address] Left 7-7-12    Broken Left Femur With Hardware    HYSTERECTOMY, TOTAL ABDOMINAL  1970's    JOINT REPLACEMENT Right 12-2-14    Total Right Knee    OTHER SURGICAL HISTORY  11/23/15    Robotic Assisted Laparoscopic Sacrocolpopexy    OTHER SURGICAL HISTORY Right 09/04/2018    orif right patella    OVARIAN CYST SURGERY Right 1957    \"Right\"    SKIN CANCER EXCISION  2000's    Skin Cancer Excised Back Of Right Leg    TOE AMPUTATION Right 1990's Or 2000's    Right Foot Little Toe    TOE AMPUTATION Right 5-23-13    Second Toe    TONSILLECTOMY  1962    TOTAL HIP ARTHROPLASTY Right 08/21/2016      Family History   Problem Relation Age of Onset    Kidney Disease Mother     Heart Disease Mother         Heart Attack     Early Death Father 44        \"[employer]\"   Elaine Medal Heart Disease Sister         Heart Attack    Early Death Brother 46    Diabetes Brother     Early Death Sister 2    Cancer Brother         Prostate Cancer    Kidney Disease Brother     Diabetes Brother     Diabetes Brother     Heart Disease Brother     Diabetes Son       Infectious disease related family history - not contibutory. SOCIAL HISTORY  Social History     Tobacco Use    Smoking status: Never Smoker    Smokeless tobacco: Never Used   Substance Use Topics    Alcohol use: No       Born:   Lived   Occupation:   No recent travel of significance.  No recent unusual exposures.  NO pets    ?   ALLERGIES  Allergies   Allergen Reactions    Percocet [Oxycodone-Acetaminophen]      \"I Get Got Crazy And Mean\"    Phenergan [Promethazine Hcl] \"Violent Behavior\"      Tape [Adhesive Tape]      \"Redness And Tears The Skin , Paper Tape Is Ok To Use\"      MEDICATIONS  Reviewed and are per the chart/EMR. IMMUNIZATION HISTORY  Immunization History   Administered Date(s) Administered    Pneumococcal Conjugate 7-valent (Pauline Leigh) 07/06/2007     ? Antibiotics:   Vancomycin  Cefepime  Remdesivir  ?  -------------------------------------------------------------------------------------------------------------------    Vital Signs:  Vitals:    10/12/20 1215   BP: (!) 178/63   Pulse: 116   Resp: 28   Temp:    SpO2: (!) 88%         Exam:    VS: noted; wt 99.3 kg  Gen: obtunded  Skin: no stigmata of endocarditis  Wounds: C/D/I  HEMT: AT/NC Oropharynx pink, moist, and without lesions or exudatesr  Eyes: PERRL, EOMI, conjunctiva pink, sclera anicteric. Neck: Supple. Trachea midline. No LAD. Chest: no distress and CTA. Good air movement. Heart: RRR and no MRG. Abd: soft, non-distended, no tenderness, no hepatomegaly. Normoactive bowel sounds. Ext: no clubbing, cyanosis, or edema  Catheter Site: without erythema or tenderness  Neuro: lethargic    ? Diagnostic Studies: reviewed  ? ? I have examined this patient and available medical records on this date and have made the above observations, conclusions and recommendations.   Electronically signed by: Electronically signed by Micha Mirza MD on 10/12/2020 at 1:55 PM

## 2020-10-12 NOTE — PROGRESS NOTES
[address]  consulted by Dr. Lalitha Haro for monitoring and adjustment. Indication for treatment: Covid-19 Pneumonia  Goal trough: 15-20 mcg/mL     Pertinent Laboratory Values:   Temp Readings from Last 3 Encounters:   10/12/20 97.4 °F (36.3 °C) (Rectal)   07/15/20 98.2 °F (36.8 °C) (Infrared)   07/08/20 98.4 °F (36.9 °C) (Infrared)     Recent Labs     10/12/20  0228   WBC 15.4*     Recent Labs     10/12/20  0228   BUN 17   CREATININE 0.7     Estimated Creatinine Clearance: 64 mL/min (based on SCr of 0.7 mg/dL). No intake or output data in the 24 hours ending 10/12/20 1139    Pertinent Cultures:  Date    Source    Results  10/12   Blood    Ordered  10/12   Resp PCR   Negative  10/12   Covid-19   Positive    Vancomycin level:   TROUGH:  No results for input(s): VANCOTROUGH in the last 72 hours. RANDOM:  No results for input(s): VANCORANDOM in the last 72 hours. Assessment:  · WBC and temperature: WBC elevated @ 15.4;  Afebrile  · SCr, BUN, and urine output: WNL, appears at baseline  · Day(s) of therapy: #1  · Vancomycin level: to be collected    Plan:  · Plan to initiate patient on vancomycin 1500 mg IVPB q24h  · Continue to follow renal trends closely with advanced age  · Plan to check a trough level prior to the 4th dose  · Pharmacy will continue to monitor patient and adjust therapy as indicated     Paco Ta 10/15 @ 1200    Thank you for the consult,  Flavia Gonzalez, [address]  10/12/2020 11:39 AM

## 2020-10-12 NOTE — ED NOTES
Report called to [facility] RN. All questions answered.       Salinas Flores, RN  10/12/20 0692

## 2020-10-12 NOTE — CONSULTS
Palliative Medicine Consultation    Reason for Consult:      _X____ Advance Care Planning  _X____Transition of Care Planning  _X____ Psychosocial Support  _X____ Symptom Management: shortness of breath    Recommendations:    1. Continue with the excellent care of this patient with COVID-19 pneumonia. 2.  DNRCC  3. Morphine for shortness of breath and pain. 4.  Briefly discussed hospice with daughter but she was too tearful to continue. Daughter wants her mother to be comfortable. Requesting Physician:  Dr. Duy Nichols    CHIEF COMPLAINT:  Shortness of breath and hypoxia    History Obtained From:  family member - daughter, electronic medical record    HISTORY OF PRESENT ILLNESS:    80year old female who presented to the ED with complaints of progressive shortness of breath. Patient is known  COVID-19 positive. She was noted to be hypoxic, anxious, and distressed. Oxygen saturation was noted to be 65% and patient had abdominal retractions. Per the patient's daughter she has been in the facility since July on a full time basis. She has had issues with a non-healing foot ulcer for which she now uses a wheelchair. The daughter denied that the patient had any dementia issues but did say after surgery or procedures she would \"never remember what happened. \"  Per the patient's nurse, Debora Walters, the patient seemed to be in pain and very anxious. Palliative Care was asked to see the patient and family for goals of care and family support.             Past Medical History:        Diagnosis Date    Acid reflux     Anesthesia     \"Combative After Anesthesia\"    Arthritis     \"All Over My Body\"    Diverticulosis     HTN (hypertension)     Hx of blood clots 2000's    \"Behind Right Knee\"    Kidney stones     Passed Kidney Stones In 1970's    Lower back pain     \"Sometimes\"    Neuropathy     bilateral feet    Osteoporosis     IV Reclast Once A Year, Last Dose Received In 2014    Purpura ([address]     \"Arms\"    Shingles     \"Twice, First Time  In 1972 Left Breast Area, Second Time In 2000 Right Lower Abdomen\"    Skin Cancer Excised Back Of Right Leg 2000's    Teeth missing     Upper And Lower    Urinary incontinence     Wears dentures     Full Upper    Wears glasses        Past Surgical History:        Procedure Laterality Date    APPENDECTOMY  1941    BLADDER SUSPENSION  1990's    BREAST CYST EXCISION Right 1955    Benign    COLONOSCOPY  Last Done In 2000's    Polpys Removed In Past    DENTAL SURGERY      Teeth Extracted In Past    DILATION AND CURETTAGE OF UTERUS      \"2 Or 3 \" Prior To DUANE In 1970's    FOOT DEBRIDEMENT Left 5/29/2020    FOOT ABSCESS DEBRIDEMENT INCISION AND DRAINAGE LEFT, EXCISIONAL BIOPSY OF THE LEFT FOOT MASS performed by Audrey Lerma MD at [address] [facility] Left 7-7-12    Broken Left Femur With Hardware    HYSTERECTOMY, TOTAL ABDOMINAL  1970's    JOINT REPLACEMENT Right 12-2-14    Total Right Knee    OTHER SURGICAL HISTORY  11/23/15    Robotic Assisted Laparoscopic Sacrocolpopexy    OTHER SURGICAL HISTORY Right 09/04/2018    orif right patella    OVARIAN CYST SURGERY Right 1957    \"Right\"    SKIN CANCER EXCISION  2000's    Skin Cancer Excised Back Of Right Leg    TOE AMPUTATION Right 1990's Or 2000's    Right Foot Little Toe    TOE AMPUTATION Right 5-23-13    Second Toe    TONSILLECTOMY  1962    TOTAL HIP ARTHROPLASTY Right 08/21/2016       Current Medications:    Current Facility-Administered Medications: sodium chloride flush 0.9 % injection 10 mL, 10 mL, Intravenous, BID  acetaminophen (TYLENOL) tablet 1,000 mg, 1,000 mg, Oral, Q6H PRN  pantoprazole (PROTONIX) tablet 40 mg, 40 mg, Oral, QAM  pregabalin (LYRICA) capsule 150 mg, 150 mg, Oral, BID  lactobacillus (CULTURELLE) capsule 1 capsule, 1 capsule, Oral, Daily  amLODIPine (NORVASC) tablet 5 mg, 5 mg, Oral, Daily  sennosides-docusate sodium (SENOKOT-S) 8.6-50 MG tablet 1 tablet, 1 tablet, Oral, Daily  furosemide (LASIX) tablet 20 mg, 20 mg, Oral, Daily  hydroCHLOROthiazide (HYDRODIURIL) tablet 12.5 mg, 12.5 mg, Oral, Daily  sodium chloride flush 0.9 % injection 10 mL, 10 mL, Intravenous, 2 times per day  sodium chloride flush 0.9 % injection 10 mL, 10 mL, Intravenous, PRN  acetaminophen (TYLENOL) tablet 650 mg, 650 mg, Oral, Q6H PRN **OR** acetaminophen (TYLENOL) suppository 650 mg, 650 mg, Rectal, Q6H PRN  LORazepam (ATIVAN) injection 1 mg, 1 mg, Intravenous, Q6H PRN  dexamethasone (DECADRON) injection 6 mg, 6 mg, Intravenous, Daily  enoxaparin (LOVENOX) injection 30 mg, 30 mg, Subcutaneous, BID  vancomycin (VANCOCIN) 1,500 mg in dextrose 5 % 500 mL IVPB, 1,500 mg, Intravenous, Q24H  cefepime (MAXIPIME) 2 g IVPB minibag, 2 g, Intravenous, Q12H  morphine (PF) injection 1 mg, 1 mg, Intravenous, Q4H PRN    Allergies:  Percocet [oxycodone-acetaminophen]; Phenergan [promethazine hcl]; and Tape [adhesive tape]    Social History:    Was residing at a [facility] since July. Has two children and is . Family History:       Problem Relation Age of Onset    Kidney Disease Mother     Heart Disease Mother         Heart Attack     Early Death Father 44        \"Muster Gas\"    Heart Disease Sister         Heart Attack    Early Death Brother 46    Diabetes Brother     Early Death Sister 2    Cancer Brother         Prostate Cancer    Kidney Disease Brother     Diabetes Brother     Diabetes Brother     Heart Disease Brother     Diabetes Son        REVIEW OF SYSTEMS:    Review of systems not obtained due to patient factors - mental status    Vitals:    Vitals:    10/12/20 1215   BP: (!) 178/63   Pulse: 116   Resp: 28   Temp:    SpO2: (!) 88%       PHYSICAL EXAM:  Due to the current efforts to prevent transmission of COVID-19 and also the need to preserve PPE for other caregivers, a face-to-face encounter with the patient was not performed.  That being said, all relevant records and diagnostic tests were reviewed, including laboratory results and imaging. Please reference any relevant documentation elsewhere. Care will be coordinated with the primary service. GENERAL:  HEENT:    COR:  LUNGS:  ABD:  SKIN:  PSYCH:  NEURO:     DATA:    CBC:   Lab Results   Component Value Date    WBC 15.4 10/12/2020    RBC 5.49 10/12/2020    HGB 13.2 10/12/2020    HCT 43.5 10/12/2020    MCV 79.2 10/12/2020    MCH 24.0 10/12/2020    MCHC 30.3 10/12/2020    RDW 17.5 10/12/2020     10/12/2020    MPV 10.8 10/12/2020     CMP:    Lab Results   Component Value Date     10/12/2020    K 3.5 10/12/2020     10/12/2020    CO2 27 10/12/2020    BUN 17 10/12/2020    CREATININE 0.7 10/12/2020    GFRAA >60 10/12/2020    AGRATIO 1.6 07/03/2020    LABGLOM >60 10/12/2020    GLUCOSE 192 10/12/2020    PROT 6.2 10/12/2020    PROT 6.2 07/03/2020    LABALBU 3.7 10/12/2020    CALCIUM 8.6 10/12/2020    BILITOT 1.2 10/12/2020    ALKPHOS 144 10/12/2020    AST 67 10/12/2020    ALT 29 10/12/2020     Albumin:    Lab Results   Component Value Date    LABALBU 3.7 10/12/2020     CTA Chest: 10/12/2020  1. Motion limited evaluation with no evidence of large or central pulmonary    embolus. 2. Patchy bilateral ground-glass opacities are consistent with the patient's    known COVID-19 pneumonia. 3. Cardiomegaly and coronary artery disease. IMPRESSION:    80year old female with COVID-19 pneumonia and hypoxia for Palliative Care encounter. I called and spoke to the patient's daughter. She was very tearful when discussing her mother's issues and comfort. She just kept saying \"I want mom to be as comfortable as possible. \"  The patient does not have advance directives but her daughter stated that she promised her mother that \"I would never put her on a breathing machine, or do CPR, or anything else just to keep her alive. I want her to go peacefully. \"  I briefly brought up hospice but the daughter was to tearful to talk about it. I will order a low dose of morphine for shortness of breath and pain. The patient's creatinine is normal.   We will continue to follow for goals of care and family support.       .Electronically signed by Marquette Gitelman, MD on 10/12/2020 at 1:59 PM

## 2020-10-12 NOTE — CONSULTS
[facility] Continence Nurse  Consult Note       Daniela Box  AGE: 80 y.o.    GENDER: female  : 1931  TODAY'S DATE:  10/12/2020    Subjective:     Reason for  Evaluation and Assessment:  Wound care eval left plantar, rt heel dti, buttocks      Nicolasa Daniel is a 80 y.o. female referred by:   [x] Physician  [] Nursing  [] Other:     Wound Identification:  Wound Type: pressure and charcoat foot  Contributing Factors: edema, chronic pressure and decreased mobility        PAST MEDICAL HISTORY        Diagnosis Date    Acid reflux     Anesthesia     \"Combative After Anesthesia\"    Arthritis     \"All Over My Body\"    Diverticulosis     HTN (hypertension)     Hx of blood clots     \"Behind Right Knee\"    Kidney stones     Passed Kidney Stones In     Lower back pain     \"Sometimes\"    Neuropathy     bilateral feet    Osteoporosis     IV Reclast Once A Year, Last Dose Received In     Purpura ([address]     \"Arms\"    Shingles     \"Twice, First Time  In  Left Breast Area, Second Time In  Right Lower Abdomen\"    Skin Cancer Excised Back Of Right Leg     Teeth missing     Upper And Lower    Urinary incontinence     Wears dentures     Full Upper    Wears glasses        PAST SURGICAL HISTORY    Past Surgical History:   Procedure Laterality Date    APPENDECTOMY      BLADDER SUSPENSION      BREAST CYST EXCISION Right     Benign    COLONOSCOPY  Last Done In     Polpys Removed In Past    DENTAL SURGERY      Teeth Extracted In Past    DILATION AND CURETTAGE OF UTERUS      \"2 Or 3 \" Prior To DUANE In     FOOT DEBRIDEMENT Left 2020    FOOT ABSCESS DEBRIDEMENT INCISION AND DRAINAGE LEFT, EXCISIONAL BIOPSY OF THE LEFT FOOT MASS performed by Norma Israel MD at [address] [facility] Left 12    Broken Left Femur With Hardware    HYSTERECTOMY, TOTAL ABDOMINAL      JOINT REPLACEMENT Right 14    Total Right Knee    OTHER SURGICAL HISTORY  11/23/15    Robotic Assisted Laparoscopic Sacrocolpopexy    OTHER SURGICAL HISTORY Right 09/04/2018    orif right patella    OVARIAN CYST SURGERY Right 1957    \"Right\"    SKIN CANCER EXCISION  2000's    Skin Cancer Excised Back Of Right Leg    TOE AMPUTATION Right 1990's Or 2000's    Right Foot Little Toe    TOE AMPUTATION Right 5-23-13    Second Toe    TONSILLECTOMY  1962    TOTAL HIP ARTHROPLASTY Right 08/21/2016       FAMILY HISTORY    Family History   Problem Relation Age of Onset    Kidney Disease Mother     Heart Disease Mother         Heart Attack     Early Death Father 44        \"Muster Gas\"    Heart Disease Sister         Heart Attack    Early Death Brother 46    Diabetes Brother     Early Death Sister 2    Cancer Brother         Prostate Cancer    Kidney Disease Brother     Diabetes Brother     Diabetes Brother     Heart Disease Brother     Diabetes Son        SOCIAL HISTORY    Social History     Tobacco Use    Smoking status: Never Smoker    Smokeless tobacco: Never Used   Substance Use Topics    Alcohol use: No    Drug use: No       ALLERGIES    Allergies   Allergen Reactions    Percocet [Oxycodone-Acetaminophen]      \"I Get Got Crazy And Mean\"    Phenergan [Promethazine Hcl]      \"Violent Behavior\"      Tape [Adhesive Tape]      \"Redness And Tears The Skin , Paper Tape Is Ok To Use\"       MEDICATIONS    No current facility-administered medications on file prior to encounter.       Current Outpatient Medications on File Prior to Encounter   Medication Sig Dispense Refill    furosemide (LASIX) 20 MG tablet Take 20 mg by mouth daily      piperacillin-tazobactam (ZOSYN) 3-0.375 GM per 50ML IVPB extended infusion Infuse 3.375 g intravenously every 6 hours      hydrochlorothiazide (HYDRODIURIL) 12.5 MG tablet Take 12.5 mg by mouth daily      lactobacillus (CULTURELLE) capsule Take 1 capsule by mouth daily      hydrogen peroxide 3 % external solution Apply topically as needed. 1 Bottle 0    amLODIPine (NORVASC) 5 MG tablet Take 1 tablet by mouth daily 30 tablet 0    sennosides-docusate sodium (SENOKOT-S) 8.6-50 MG tablet Take 1 tablet by mouth daily      pregabalin (LYRICA) 150 MG capsule Take 1 capsule by mouth 2 times daily.  60 capsule 0    acetaminophen (TYLENOL) 500 MG tablet Take 2 tablets by mouth every 6 hours as needed for Pain or Fever 120 tablet 3    pantoprazole (PROTONIX) 40 MG tablet Take 1 tablet by mouth every morning 30 tablet 3         Objective:      BP (!) 170/81   Pulse 114   Temp 97.4 °F (36.3 °C) (Rectal)   Resp 19   Ht 5' 5\" (1.651 m)   Wt 219 lb (99.3 kg)   SpO2 (!) 88%   BMI 36.44 kg/m²   Jaime Risk Score: Jaime Scale Score: 10    LABS    CBC:   Lab Results   Component Value Date    WBC 15.4 10/12/2020    RBC 5.49 10/12/2020    HGB 13.2 10/12/2020    HCT 43.5 10/12/2020    MCV 79.2 10/12/2020    MCH 24.0 10/12/2020    MCHC 30.3 10/12/2020    RDW 17.5 10/12/2020     10/12/2020    MPV 10.8 10/12/2020     CMP:    Lab Results   Component Value Date     10/12/2020    K 3.5 10/12/2020     10/12/2020    CO2 27 10/12/2020    BUN 17 10/12/2020    CREATININE 0.7 10/12/2020    GFRAA >60 10/12/2020    AGRATIO 1.6 07/03/2020    LABGLOM >60 10/12/2020    GLUCOSE 192 10/12/2020    PROT 6.2 10/12/2020    PROT 6.2 07/03/2020    LABALBU 3.7 10/12/2020    CALCIUM 8.6 10/12/2020    BILITOT 1.2 10/12/2020    ALKPHOS 144 10/12/2020    AST 67 10/12/2020    ALT 29 10/12/2020     Albumin:    Lab Results   Component Value Date    LABALBU 3.7 10/12/2020     PT/INR:    Lab Results   Component Value Date    PROTIME 15.2 10/12/2020    PROTIME 11.2 12/27/2011    INR 1.25 10/12/2020     HgBA1c:  No results found for: LABA1C      Assessment:     Patient Active Problem List   Diagnosis    Closed fracture of right hip ([address]    HTN (hypertension)    Vaginal vault prolapse after hysterectomy    Varicose veins of leg with swelling    Venous stasis dermatitis of left lower extremity    Chronic venous hypertension    Lower leg edema    Blister of foot, left    Lymphedema of both lower extremities    Closed displaced fracture of right patella    Fall at home    Left skew foot deformity    Postural dizziness with near syncope    Fall at home, sequela    Acute kidney injury ([facility] [address]    Frequent falls    Morbid obesity due to excess calories ([address]    Fall at home, subsequent encounter    Fall (on)(from) incline, initial encounter    Cellulitis    Cellulitis of left foot    Uncontrolled pain    Generalized weakness    Gait disturbance    Charcot's joint of left foot    Ischemic ulcer of left heel with fat layer exposed ([address]    Pyogenic inflammation of bone (HCC)    Pseudomonas infection    Foot abscess, left    Receiving intravenous antibiotic treatment as outpatient    Diarrhea    Open wound of left foot    Pneumonia due to COVID-19 virus       Measurements:  Incision 08/21/16 Hip (Active)   Number of days: 1512       Incision 09/04/18 Knee Right (Active)   Number of days: 768       Wound 02/03/20 Foot Left;Plantar (Active)   Number of days: 251       Wound 06/01/20 Foot Left;Plantar (Active)   Number of days: 133       Wound 06/01/20 Foot Left;Lateral;Plantar (Active)   Number of days: 133       Wound 06/07/20 Buttocks red with small open area (Active)   Number of days: 126       Wound 10/12/20 Buttocks Left (Active)   Wound Etiology Pressure Stage  2 10/12/20 1130   Dressing Status Other (Comment) 10/12/20 1130   Wound Cleansed Cleansed with saline 10/12/20 1130   Wound Length (cm) 1 cm 10/12/20 1130   Wound Width (cm) 1 cm 10/12/20 1130   Wound Depth (cm) 0.1 cm 10/12/20 1130   Wound Surface Area (cm^2) 1 cm^2 10/12/20 1130   Wound Volume (cm^3) 0.1 cm^3 10/12/20 1130   Distance Tunneling (cm) 0 cm 10/12/20 1130   Tunneling Position ___ O'Clock 0 10/12/20 1130   Undermining Starts ___ O'Clock 0 10/12/20 1130   Undermining Ends___ None 10/12/20 1130   Odor None 10/12/20 1130   Emily-wound Assessment Dry/flaky 10/12/20 1130   Margins Attached edges 10/12/20 1130   Number of days: 0       Response to treatment:  Poorly tolerated by patient. Pain Assessment:  Severity:  Pt moaning   Quality of pain:  Pt did not verbalize   Wound Pain Timing/Severity:    Premedicated: no    Plan:     Plan of Care: Wound 10/12/20 Buttocks Left-Dressing/Treatment: (calazime)  Wound 10/12/20 Foot Left;Plantar-Dressing/Treatment: (NS moist gauze, abd, kerlix)  Wound 10/12/20 Heel Right-Dressing/Treatment: (skin prep)     Pt in bed with restraints on is moaning as if aggravated. Pt's nurse in the room. Wound care for eval for lt plantar wound charcoat foot, rt heel dti and buttocks wound. Pt has wound to left plantar measured and pictured dressing as above is a chronic wound. Pt has dti to rt heel wiped with skin prep. Buttocks pressure stage 2 cleansed with ns measured and pictured applied calazime cream. Heels floated. Pt turned to lt side. Pt is at high risk for skin breakdown AEB kyle score. Observe kyle orders. Specialty Bed Required : yes  [x] Low Air Loss   [] Pressure Redistribution  [] Fluid Immersion  [] Bariatric  [] Total Pressure Relief  [] Other:     Discharge Plan:  Placement for patient upon discharge: tbd   Hospice Care: no  Patient appropriate for Outpatient [address]:  [facility]    Patient/Caregiver Teaching:  Level of patient/caregiver understanding able to:    cares explained as given. Electronically signed by Ayana Rodriguez RN,  on 10/12/2020 at 12:51 PM

## 2020-10-12 NOTE — ED PROVIDER NOTES
Triage Chief Complaint:   Shortness of Breath    [place]:  Today in the ED I had the pleasure of caring for Tomas Thompson who is a 80 y.o. female that presents today to the emergency department. For hypoxia. Patient comes from local skilled nursing facility. She is COVID positive. Per reports from EMS patient has had hypoxia and shortness of breath is been progressive. Patient herself states \"I do not feel good\". However she appears to be anxious and distressed. Very poor historian. Per nurse at nursing home. Sob x 2 days much worse today. Nurse noted O2 of 65% and abdominal retractions. Pt kept taking off her nasal canula.      ROS:  REVIEW OF SYSTEMS    At least 10 systems reviewed      All other review of systems are negative  See HPI and nursing notes for additional information       Past Medical History:   Diagnosis Date    Acid reflux     Anesthesia     \"Combative After Anesthesia\"    Arthritis     \"All Over My Body\"    Diverticulosis     HTN (hypertension)     Hx of blood clots 2000's    \"Behind Right Knee\"    Kidney stones     Passed Kidney Stones In 1970's    Lower back pain     \"Sometimes\"    Neuropathy     bilateral feet    Osteoporosis     IV Reclast Once A Year, Last Dose Received In 2014    Purpura ([address]     \"Arms\"    Shingles     \"Twice, First Time  In 1972 Left Breast Area, Second Time In 2000 Right Lower Abdomen\"    Skin Cancer Excised Back Of Right Leg 2000's    Teeth missing     Upper And Lower    Urinary incontinence     Wears dentures     Full Upper    Wears glasses      Past Surgical History:   Procedure Laterality Date    APPENDECTOMY  1941    BLADDER SUSPENSION  1990's    BREAST CYST EXCISION Right 1955    Benign    COLONOSCOPY  Last Done In 2000's    Polpys Removed In Past    DENTAL SURGERY      Teeth Extracted In Past    DILATION AND CURETTAGE OF UTERUS      \"2 Or 3 \" Prior To DUANE In 1970's    FOOT DEBRIDEMENT Left 5/29/2020    FOOT ABSCESS DEBRIDEMENT INCISION Skin , Paper Tape Is Ok To Use\"       Nursing Notes Reviewed    Physical Exam:  ED Triage Vitals   Enc Vitals Group      BP 10/12/20 0219 (!) 177/79      Pulse 10/12/20 0219 88      Resp 10/12/20 0219 13      Temp 10/12/20 0219 97.9 °F (36.6 °C)      Temp Source 10/12/20 0219 Oral      SpO2 10/12/20 0219 (!) 89 %      Weight 10/12/20 0215 219 lb (99.3 kg)      Height 10/12/20 0215 5' 5\" (1.651 m)      Head Circumference --       Peak Flow --       Pain Score --       Pain Loc --       Pain Edu? --       Excl. in [address]? --      General : Patient is elderly demented female anxious. Pulling at her IV pulling at her mask. Does not appear to be alert and oriented to place but is oriented to person. HEENT: Pupils are equally round and reactive to light extraocular motors are intact conjunctivae clear sclerae white there is no injection no icterus. Nose without any rhinorrhea or epistaxis. Oral mucosa is moist no exudate buccal mucosa shows no ulcerations. Uvula is midline    Neck: Neck is supple full range of motion trachea midline thyroid nonpalpable  Cardiac: Heart regular rate rhythm no murmurs rubs clicks or gallops  Lungs: Sounds are distant. There is mild accessory muscle use noted with tachypnea. Chest wall: There is no tenderness to palpation over the chest wall or over ribs  Abdomen: Abdomen is soft nontender nondistended. There is no firm or pulsatile masses no rebound rigidity or guarding negative Del Rio's negative McBurney, no peritoneal signs  Suprapubic:  there is no tenderness to palpation over the external bladder   Musculoskeletal: 5 out of 5 strength in all 4 extremities full flexion extension abduction and adduction supination pronation of all extremities and all digits. No obvious muscle atrophy is noted.  No focal muscle deficits are appreciated  Dermatology: Skin is warm and dry there is no obvious abscesses lacerations or lesions noted  Psych: Mentation is grossly normal cognition is grossly normal. Affect is anxious      I have reviewed and interpreted all of the currently available lab results from this visit (if applicable):  Results for orders placed or performed during the hospital encounter of 10/12/20   CBC Auto Differential   Result Value Ref Range    WBC 15.4 (H) 4.0 - 10.5 K/CU MM    RBC 5.49 (H) 4.2 - 5.4 M/CU MM    Hemoglobin 13.2 12.5 - 16.0 GM/DL    Hematocrit 43.5 37 - 47 %    MCV 79.2 78 - 100 FL    MCH 24.0 (L) 27 - 31 PG    MCHC 30.3 (L) 32.0 - 36.0 %    RDW 17.5 (H) 11.7 - 14.9 %    Platelets 345 255 - 949 K/CU MM    MPV 10.8 7.5 - 11.1 FL    Promyelocytes Percent 1 (HH) 0.0 %    Myelocyte Percent 2 (H) 0.0 %    Metamyelocytes Relative 2 (H) 0.0 %    Bands Relative 7 5 - 11 %    Segs Relative 73.0 (H) 36 - 66 %    Lymphocytes % 13.0 (L) 24 - 44 %    Monocytes % 2.0 0 - 4 %    Promyelocytes Absolute 0.15 K/CU MM    Myelocytes Absolute 0.31 K/CU MM    Metamyelocytes Absolute 0.31 K/CU MM    Bands Absolute 1.08 K/CU MM    Segs Absolute 11.2 K/CU MM    Lymphocytes Absolute 2.0 K/CU MM    Monocytes Absolute 0.3 K/CU MM    Differential Type MANUAL DIFFERENTIAL     Anisocytosis 1+     Polychromasia 1+     Giant PLTs PRESENT  PRESENT      Comprehensive Metabolic Panel   Result Value Ref Range    Sodium 140 135 - 145 MMOL/L    Potassium 3.5 3.5 - 5.1 MMOL/L    Chloride 100 99 - 110 mMol/L    CO2 27 21 - 32 MMOL/L    BUN 17 6 - 23 MG/DL    CREATININE 0.7 0.6 - 1.1 MG/DL    Glucose 192 (H) 70 - 99 MG/DL    Calcium 8.6 8.3 - 10.6 MG/DL    Alb 4.0 3.4 - 5.0 GM/DL    Total Protein 7.1 6.4 - 8.2 GM/DL    Total Bilirubin 1.1 (H) 0.0 - 1.0 MG/DL    ALT 30 10 - 40 U/L    AST 74 (H) 15 - 37 IU/L    Alkaline Phosphatase 159 (H) 40 - 128 IU/L    GFR Non-African American >60 >60 mL/min/1.73m2    GFR African American >60 >60 mL/min/1.73m2    Anion Gap 13 4 - 16   Troponin   Result Value Ref Range    Troponin T <0.010 <0.01 NG/ML   Lipase   Result Value Ref Range    Lipase 55 13 - 60 IU/L   Brain Natriuretic Peptide   Result Value Ref Range    Pro-BNP 1,464 (H) <300 PG/ML   COVID-19    Specimen: Nasopharyngeal Swab   Result Value Ref Range    Source THROAT     SARS-CoV-2, NAAT DETECTED (A)    EKG 12 Lead   Result Value Ref Range    Ventricular Rate 84 BPM    Atrial Rate 84 BPM    P-R Interval 126 ms    QRS Duration 78 ms    Q-T Interval 430 ms    QTc Calculation (Bazett) 508 ms    P Axis 37 degrees    R Axis 47 degrees    T Axis 63 degrees    Diagnosis       Normal sinus rhythm  Marked ST abnormality, possible lateral subendocardial injury  Abnormal ECG  When compared with ECG of 27-MAY-2020 19:04,  ST now depressed in Anterolateral leads        Radiographs (if obtained):  [] The following radiograph was interpreted by myself in the absence of a radiologist:   [] Radiologist's Report Reviewed:  CTA PULMONARY W CONTRAST   Final Result   1. Motion limited evaluation with no evidence of large or central pulmonary   embolus. 2. Patchy bilateral ground-glass opacities are consistent with the patient's   known COVID-19 pneumonia. 3. Cardiomegaly and coronary artery disease. CT HEAD WO CONTRAST   Final Result   Motion limited evaluation with no overt evidence of acute intracranial   abnormality. XR CHEST PORTABLE   Final Result   Patchy bilateral airspace opacities, right greater than left. Findings are   consistent with the patient's known COVID-19 pneumonia. EKG (if obtained):   Please See Note of attending physician for EKG interpretation. Chart review shows recent radiograph(s):  No results found. MDM:   Demented patient presents today to the emergency department. With hypoxia. Has known COVID. X-ray shows COVID pneumonia. She does have a bit of a leukocytosis of 15,000. No left shift. Metabolic panel is unremarkable here  . Her BNP is a bit elevated. However I have low clinical suspicion of fluid overload.   CT scan does reveal patchy bilateral groundglass opacities consistent with patient's known COVID pneumonia. On-call physician Was consulted regarding patient. After thorough discussion regarding patient's history, physical exam.  laboratory values, radiographic evidence (if applicable  theymyself as well as my attending physician agreed Given the patient's presenting concerns, medical history and clinical findings, the patient will be admitted at this time to undergo further evaluation and disposition. . During patient's entire stay in the ED patient remained stable and comfortable. Analgesia is well-controlled. Patient will be admitted for all information regarding ongoing management and care of patient please see note of Admitting physician. I saw this patient ine conjunction with Attending Physician Dr. Elsy Chandra    All EKG interpretations are performed by Attending physician            BP (!) 177/79   Pulse 88   Temp 97.9 °F (36.6 °C) (Oral)   Resp 13   Ht 5' 5\" (1.651 m)   Wt 219 lb (99.3 kg)   SpO2 (!) 89%   BMI 36.44 kg/m²       Clinical Impression:  1. COVID-19    2. Pneumonia due to organism        Disposition referral (if applicable):  No follow-up provider specified. Disposition medications (if applicable):  New Prescriptions    No medications on file         Comment: Please note this report has been produced using speech recognition software and may contain errors related to that system including errors in grammar, punctuation, and spelling, as well as words and phrases that may be inappropriate. If there are any questions or concerns please feel free to contact the dictating provider for clarification.       Pamela Moore, [id number] [place], [place]  10/12/20 0612

## 2020-10-12 NOTE — CONSULTS
Subjective:   CHIEF COMPLAINT / HPI:  80year old female came from [facility] and is covid positive. She is admitted with tachypnea,using acccessory muscle of breathing and severe hypoxemia. She is currently on 100% fio2 thru vapotherm.       Past Medical History:  Past Medical History:   Diagnosis Date    Acid reflux     Anesthesia     \"Combative After Anesthesia\"    Arthritis     \"All Over My Body\"    Diverticulosis     HTN (hypertension)     Hx of blood clots 2000's    \"Behind Right Knee\"    Kidney stones     Passed Kidney Stones In 1970's    Lower back pain     \"Sometimes\"    Neuropathy     bilateral feet    Osteoporosis     IV Reclast Once A Year, Last Dose Received In 2014    Purpura ([address]     \"Arms\"    Shingles     \"Twice, First Time  In 1972 Left Breast Area, Second Time In 2000 Right Lower Abdomen\"    Skin Cancer Excised Back Of Right Leg 2000's    Teeth missing     Upper And Lower    Urinary incontinence     Wears dentures     Full Upper    Wears glasses        Past Surgical History:        Procedure Laterality Date    APPENDECTOMY  1941    BLADDER SUSPENSION  1990's    BREAST CYST EXCISION Right 1955    Benign    COLONOSCOPY  Last Done In 2000's    Polpys Removed In Past    DENTAL SURGERY      Teeth Extracted In Past    DILATION AND CURETTAGE OF UTERUS      \"2 Or 3 \" Prior To DUANE In 1970's    FOOT DEBRIDEMENT Left 5/29/2020    FOOT ABSCESS DEBRIDEMENT INCISION AND DRAINAGE LEFT, EXCISIONAL BIOPSY OF THE LEFT FOOT MASS performed by Cody Curry MD at [place] [id number] Left 7-7-12    Broken Left Femur With Hardware    HYSTERECTOMY, TOTAL ABDOMINAL  1970's    JOINT REPLACEMENT Right 12-2-14    Total Right Knee    OTHER SURGICAL HISTORY  11/23/15    Robotic Assisted Laparoscopic Sacrocolpopexy    OTHER SURGICAL HISTORY Right 09/04/2018    orif right patella    OVARIAN CYST SURGERY Right 1957    \"Right\"    SKIN CANCER EXCISION  2000's    Skin Cancer Excised Back Of Right Leg    TOE AMPUTATION Right 1990's Or 2000's    Right Foot Little Toe    TOE AMPUTATION Right 5-23-13    Second Toe    TONSILLECTOMY  1962    TOTAL HIP ARTHROPLASTY Right 08/21/2016       Current Medications:    Current Facility-Administered Medications: sodium chloride flush 0.9 % injection 10 mL, 10 mL, Intravenous, BID  acetaminophen (TYLENOL) tablet 1,000 mg, 1,000 mg, Oral, Q6H PRN  pantoprazole (PROTONIX) tablet 40 mg, 40 mg, Oral, QAM  pregabalin (LYRICA) capsule 150 mg, 150 mg, Oral, BID  lactobacillus (CULTURELLE) capsule 1 capsule, 1 capsule, Oral, Daily  amLODIPine (NORVASC) tablet 5 mg, 5 mg, Oral, Daily  sennosides-docusate sodium (SENOKOT-S) 8.6-50 MG tablet 1 tablet, 1 tablet, Oral, Daily  furosemide (LASIX) tablet 20 mg, 20 mg, Oral, Daily  hydroCHLOROthiazide (HYDRODIURIL) tablet 12.5 mg, 12.5 mg, Oral, Daily  sodium chloride flush 0.9 % injection 10 mL, 10 mL, Intravenous, 2 times per day  sodium chloride flush 0.9 % injection 10 mL, 10 mL, Intravenous, PRN  acetaminophen (TYLENOL) tablet 650 mg, 650 mg, Oral, Q6H PRN **OR** acetaminophen (TYLENOL) suppository 650 mg, 650 mg, Rectal, Q6H PRN  LORazepam (ATIVAN) injection 1 mg, 1 mg, Intravenous, Q6H PRN  dexamethasone (DECADRON) injection 6 mg, 6 mg, Intravenous, Daily  enoxaparin (LOVENOX) injection 30 mg, 30 mg, Subcutaneous, BID  vancomycin (VANCOCIN) 1,500 mg in dextrose 5 % 500 mL IVPB, 1,500 mg, Intravenous, Q24H  cefepime (MAXIPIME) 2 g IVPB minibag, 2 g, Intravenous, Q12H  morphine (PF) injection 1 mg, 1 mg, Intravenous, Q4H PRN    Allergies   Allergen Reactions    Percocet [Oxycodone-Acetaminophen]      \"I Get Got Crazy And Mean\"    Phenergan [Promethazine Hcl]      \"Violent Behavior\"      Tape [Adhesive Tape]      \"Redness And Tears The Skin , Paper Tape Is Ok To Use\"       Social History:    Social History     Socioeconomic History    Marital status:       Spouse name: None    Number of children: None    Years of education: None    Highest education level: None   Occupational History    None   Social Needs    Financial resource strain: None    Food insecurity     Worry: None     Inability: None    Transportation needs     Medical: None     Non-medical: None   Tobacco Use    Smoking status: Never Smoker    Smokeless tobacco: Never Used   Substance and Sexual Activity    Alcohol use: No    Drug use: No    Sexual activity: Never   Lifestyle    Physical activity     Days per week: None     Minutes per session: None    Stress: None   Relationships    Social connections     Talks on phone: None     Gets together: None     Attends Yarsani service: None     Active member of club or organization: None     Attends meetings of clubs or organizations: None     Relationship status: None    Intimate partner violence     Fear of current or ex partner: None     Emotionally abused: None     Physically abused: None     Forced sexual activity: None   Other Topics Concern    None   Social History Narrative    None       Family History:   Family History   Problem Relation Age of Onset    Kidney Disease Mother     Heart Disease Mother         Heart Attack     Early Death Father 44        \"Muster Gas\"    Heart Disease Sister         Heart Attack    Early Death Brother 46    Diabetes Brother     Early Death Sister 2    Cancer Brother         Prostate Cancer    Kidney Disease Brother     Diabetes Brother     Diabetes Brother     Heart Disease Brother     Diabetes Son        Immunization:  Immunization History   Administered Date(s) Administered    Pneumococcal Conjugate 7-valent (Prevnar7) 07/06/2007         REVIEW OF SYSTEMS:    CONSTITUTIONAL:  negative for fevers, chills, diaphoresis, activity change, appetite change, fatigue, night sweats and unexpected weight change.    EYES:  negative for blurred vision, eye discharge, visual disturbance and icterus  HEENT:  negative for hearing loss, tinnitus, ear drainage, obvious depression or anxiety. MUSCULOSKELETAL: No obvious misalignment or effusion of the joints. No clubbing, cyanosis of the digits. RIGHT AND LEFT LOWER EXTREMITIES: No edema, no inflammation, no tenderness. SKIN:  normal skin color, texture, turgor and no redness, warmth, or swelling. No palpable nodules    DATA:               EXAMINATION:    CTA OF THE CHEST 10/12/2020 4:14 am         TECHNIQUE:    CTA of the chest was performed after the administration of intravenous    contrast.  Multiplanar reformatted images are provided for review.  MIP    images are provided for review. Dose modulation, iterative reconstruction,    and/or weight based adjustment of the mA/kV was utilized to reduce the    radiation dose to as low as reasonably achievable.         COMPARISON:    None.         HISTORY:    ORDERING SYSTEM PROVIDED HISTORY: sob    TECHNOLOGIST PROVIDED HISTORY:    Reason for exam:->sob    Reason for Exam: ananda d-dimer         FINDINGS:    Pulmonary Arteries: The pulmonary arteries are adequately opacified for    evaluation.  Evaluation is significantly limited by respiratory motion. There is no evidence of large or central pulmonary embolus.  The main    pulmonary artery is normal in caliber.         Mediastinum: The heart is enlarged.  Normal caliber of the aorta.  Moderate    atherosclerotic plaque.  Coronary artery calcifications are noted.  No    mediastinal adenopathy.         Lungs/pleura: Patchy bilateral ground-glass opacities involving all lobes are    consistent with the patient's known COVID-19 pneumonia.  No effusion or    pneumothorax.  Evaluation for small pulmonary nodules is limited.         Upper Abdomen: Limited images of the upper abdomen are without acute findings.         Soft Tissues/Bones: Within the limitations of respiratory motion, no acute    bony findings are identified.              Impression    1.  Motion limited evaluation with no evidence of large or central pulmonary embolus. 2. Patchy bilateral ground-glass opacities are consistent with the patient's    known COVID-19 pneumonia. 3. Cardiomegaly and coronary artery disease.                     Assessment:     1. Ac hypoxemic resp failure  2. covid pneumonia coni          Plan:     1. Adequate o2 adm  2. Continue vapotherm  3. Start remdesivir  4. Status DNRCC  5. Continue antibx  6. Bd rx as needed  7. Thanks will follow  8.  D dimer is 5241 will start lovenox

## 2020-10-12 NOTE — PROGRESS NOTES
Patient arrived to unit from er per cart with non-rebreather in place. Patient alert but  has incomprehensive speech. Call light within reach. Skin assessment completed and areas noted, see head to toe assessment.

## 2020-10-12 NOTE — PROGRESS NOTES
COVID-19 pneumonia  -rapid Covid positive. CT chest: Bilateral groundglass opacities. On Vapotherm at 100%. On Solu-Medrol. On Lovenox. On Decadron. Consult ID and pulmonology. Hypertension  -Refusing oral meds at this time. Place on PRN labetalol and hydralazine. Elevated LFTs  -Continue to monitor. Spoke with daughter, Brissa Villasenor, who is POA. She is not sure as to how much treatment her mother would want. She does state that she wants her mother to be comfortable, but not sure how aggressive she wants to be with treatment. She definitely knows that she does not want any CPR, shocking, or intubation for patient. Does not want anything significantly aggressive. At this time we will continue with IV treatment, and keep patient a DNR CC. We will also give Ativan for anxiety and agitation. Consult palliative care to see if they can help with clarifying goals of care and CODE STATUS for patient.

## 2020-10-13 NOTE — CARE COORDINATION
Patient on COVID unit on Vapotherm . Patient has admitted from [facility]. Hospice consult noted. Phoned and spoke with patient's daughter Queenie Brewster. She is agreeable to Hospice and [facility]. Referral called to [facility].

## 2020-10-13 NOTE — PROGRESS NOTES
[address] [facility]  consulted by Dr. Belva Najjar for monitoring and adjustment. Indication for treatment: Covid-19 pneumonia, possible secondary bacterial infection   Goal trough: 15 mcg/mL (AUC/-600)     Pertinent Laboratory Values:   Temp Readings from Last 3 Encounters:   10/13/20 95.2 °F (35.1 °C) (Rectal)   07/15/20 98.2 °F (36.8 °C) (Infrared)   07/08/20 98.4 °F (36.9 °C) (Infrared)     Recent Labs     10/12/20  0228 10/13/20  0500   WBC 15.4* 11.1*     Recent Labs     10/12/20  0228 10/12/20  1125 10/13/20  0500   BUN 17 17 16   CREATININE 0.7 0.7 0.5*     Estimated Creatinine Clearance: 87 mL/min (A) (based on SCr of 0.5 mg/dL (L)). Intake/Output Summary (Last 24 hours) at 10/13/2020 1007  Last data filed at 10/13/2020 0233  Gross per 24 hour   Intake --   Output 2275 ml   Net -2275 ml       Pertinent Cultures:  Date    Source    Results  10/12   Blood    NGTD  10/12   Resp PCR   Negative  10/12   Covid-19   Positive  10/12   Legionella/ strep pneumo ag  Pending     Vancomycin level:   TROUGH:  No results for input(s): VANCOTROUGH in the last 72 hours. RANDOM:  No results for input(s): VANCORANDOM in the last 72 hours.     Assessment:  · WBC and temperature: WBC elevated (receiving dexamethasone); afebrile  · SCr, BUN, and urine output: stable  · Day(s) of therapy: 2  · Vancomycin level: to be collected    Plan:  · Vancomycin 1500 mg IVPB q24h  · Anticipated trough 12, AUC/   · Plan to check a trough level prior to the 4th dose  · MRSA screen   · Pharmacy will continue to monitor patient and adjust therapy as indicated    [address] 10/15 @ 1200    Thank you for the consult,  Shane Mariano, PharmD, BCPS   10/13/2020 10:07 AM

## 2020-10-13 NOTE — PROGRESS NOTES
masses. RESP Clear to auscultation, no wheezes, rales or rhonchi. Symmetric chest movement while on room air. CARDIO/VASC S1/S2 auscultated. Regular rate without appreciable murmurs, rubs, or gallops. Peripheral pulses equal bilaterally and palpable. No peripheral edema. GI Abdomen is soft without significant tenderness, masses, or guarding. Bowel sounds are normoactive. Rectal exam deferred.  [place] catheter is not present. HEME/LYMPH No petechiae or ecchymoses. MSK No gross joint deformities. Spontaneous movement of all extremities  SKIN Normal coloration, warm, dry. NEURO Cranial nerves appear grossly intact, normal speech, no lateralizing weakness. PSYCH Awake, alert, oriented x 4. Affect appropriate.     Medications:   Medications:    sodium chloride flush  10 mL Intravenous BID    pantoprazole  40 mg Oral QAM    pregabalin  150 mg Oral BID    lactobacillus  1 capsule Oral Daily    amLODIPine  5 mg Oral Daily    sennosides-docusate sodium  1 tablet Oral Daily    furosemide  20 mg Oral Daily    hydroCHLOROthiazide  12.5 mg Oral Daily    sodium chloride flush  10 mL Intravenous 2 times per day    dexamethasone  6 mg Intravenous Daily    enoxaparin  30 mg Subcutaneous BID    vancomycin  1,500 mg Intravenous Q24H    cefepime  2 g Intravenous Q12H    remdesivir IVPB  100 mg Intravenous Daily      Infusions:   PRN Meds: morphine, 2 mg, Q3H PRN  acetaminophen, 1,000 mg, Q6H PRN  sodium chloride flush, 10 mL, PRN  acetaminophen, 650 mg, Q6H PRN    Or  acetaminophen, 650 mg, Q6H PRN  LORazepam, 1 mg, Q6H PRN  sodium chloride, 30 mL, PRN        Data    Recent Labs     10/12/20  0228 10/13/20  0500   WBC 15.4* 11.1*   HGB 13.2 10.5*   HCT 43.5 35.7*    212      Recent Labs     10/12/20  0228 10/12/20  1125 10/13/20  0500     --  142   K 3.5  --  3.5     --  105   CO2 27  --  25   PHOS  --   --  2.1*   BUN 17 17 16   CREATININE 0.7 0.7 0.5*     Recent Labs     10/12/20  0228 10/12/20  1125 10/13/20  0500   AST 74* 67* 47*  47*   ALT 30 29 20  20   BILIDIR  --  0.6* 0.3   BILITOT 1.1* 1.2* 0.7  0.7   ALKPHOS 159* 144* 112  112     Recent Labs     10/12/20  1125 10/13/20  0500   INR 1.25 1.17     No results for input(s): CKTOTAL, CKMB, CKMBINDEX, TROPONINI in the last 72 hours.         Electronically signed by Essence Carmona MD on 10/13/2020 at 2:41 PM

## 2020-10-13 NOTE — PROGRESS NOTES
Pt fell out due to a positive screen for wounds and difficulties swallowing. Pt is currently COVID positive and family is pursuing hospice with a DNR-CC as code status. Will follow from [facility] for any possible changes in status and nutritional needs.     Electronically signed by Mariela Deluna RD, LD on 26/50/4643 at 2:33 PM    [id number]

## 2020-10-13 NOTE — PLAN OF CARE
Problem: Falls - Risk of:  Goal: Will remain free from falls  Description: Will remain free from falls  Outcome: Ongoing  Goal: Absence of physical injury  Description: Absence of physical injury  Outcome: Ongoing     Problem: Airway Clearance - Ineffective  Goal: Achieve or maintain patent airway  Outcome: Ongoing     Problem: Gas Exchange - Impaired  Goal: Absence of hypoxia  Outcome: Ongoing  Goal: Promote optimal lung function  Outcome: Ongoing     Problem: Breathing Pattern - Ineffective  Goal: Ability to achieve and maintain a regular respiratory rate  Outcome: Ongoing     Problem:  Body Temperature -  Risk of, Imbalanced  Goal: Ability to maintain a body temperature within defined limits  Outcome: Ongoing  Goal: Will regain or maintain usual level of consciousness  Outcome: Ongoing  Goal: Complications related to the disease process, condition or treatment will be avoided or minimized  Outcome: Ongoing     Problem: Isolation Precautions - Risk of Spread of Infection  Goal: Prevent transmission of infection  Outcome: Ongoing     Problem: Nutrition Deficits  Goal: Optimize nutrtional status  Outcome: Ongoing     Problem: Risk for Fluid Volume Deficit  Goal: Maintain normal heart rhythm  Outcome: Ongoing  Goal: Maintain absence of muscle cramping  Outcome: Ongoing  Goal: Maintain normal serum potassium, sodium, calcium, phosphorus, and pH  Outcome: Ongoing     Problem: Loneliness or Risk for Loneliness  Goal: Demonstrate positive use of time alone when socialization is not possible  Outcome: Ongoing     Problem: Fatigue  Goal: Verbalize increase energy and improved vitality  Outcome: Ongoing     Problem: Patient Education: Go to Patient Education Activity  Goal: Patient/Family Education  Outcome: Ongoing     Problem: Restraint Use - Nonviolent/Non-Self-Destructive Behavior:  Goal: Absence of restraint indications  Description: Absence of restraint indications  Outcome: Ongoing  Goal: Absence of restraint-related injury  Description: Absence of restraint-related injury  Outcome: Ongoing     Problem: Skin Integrity:  Goal: Will show no infection signs and symptoms  Description: Will show no infection signs and symptoms  Outcome: Ongoing  Goal: Absence of new skin breakdown  Description: Absence of new skin breakdown  Outcome: Ongoing     Problem: Pain:  Description: Pain management should include both nonpharmacologic and pharmacologic interventions.   Goal: Pain level will decrease  Description: Pain level will decrease  Outcome: Ongoing  Goal: Control of acute pain  Description: Control of acute pain  Outcome: Ongoing  Goal: Control of chronic pain  Description: Control of chronic pain  Outcome: Ongoing     Problem: Confusion - Acute:  Goal: Absence of continued neurological deterioration signs and symptoms  Description: Absence of continued neurological deterioration signs and symptoms  Outcome: Ongoing  Goal: Mental status will be restored to baseline  Description: Mental status will be restored to baseline  Outcome: Ongoing     Problem: Discharge Planning:  Goal: Ability to perform activities of daily living will improve  Description: Ability to perform activities of daily living will improve  Outcome: Ongoing  Goal: Participates in care planning  Description: Participates in care planning  Outcome: Ongoing     Problem: Injury - Risk of, Physical Injury:  Goal: Will remain free from falls  Description: Will remain free from falls  Outcome: Ongoing  Goal: Absence of physical injury  Description: Absence of physical injury  Outcome: Ongoing     Problem: Mood - Altered:  Goal: Mood stable  Description: Mood stable  Outcome: Ongoing  Goal: Absence of abusive behavior  Description: Absence of abusive behavior  Outcome: Ongoing  Goal: Verbalizations of feeling emotionally comfortable while being cared for will increase  Description: Verbalizations of feeling emotionally comfortable while being cared for will increase  Outcome: Ongoing     Problem: Psychomotor Activity - Altered:  Goal: Absence of psychomotor disturbance signs and symptoms  Description: Absence of psychomotor disturbance signs and symptoms  Outcome: Ongoing     Problem: Sensory Perception - Impaired:  Goal: Demonstrations of improved sensory functioning will increase  Description: Demonstrations of improved sensory functioning will increase  Outcome: Ongoing  Goal: Decrease in sensory misperception frequency  Description: Decrease in sensory misperception frequency  Outcome: Ongoing  Goal: Able to refrain from responding to false sensory perceptions  Description: Able to refrain from responding to false sensory perceptions  Outcome: Ongoing  Goal: Demonstrates accurate environmental perceptions  Description: Demonstrates accurate environmental perceptions  Outcome: Ongoing  Goal: Able to distinguish between reality-based and nonreality-based thinking  Description: Able to distinguish between reality-based and nonreality-based thinking  Outcome: Ongoing  Goal: Able to interrupt nonreality-based thinking  Description: Able to interrupt nonreality-based thinking  Outcome: Ongoing     Problem: Sleep Pattern Disturbance:  Goal: Appears well-rested  Description: Appears well-rested  Outcome: Ongoing

## 2020-10-13 NOTE — PROGRESS NOTES
Spoke with the patient's daughter regarding her mother's care. The patient is on 100% vapotherm and sating in the 80's. She continues to seem to be in pain or anxious per the nurse. I called and spoke with the patient's daughter and recommended that we get a hospice consult. The daughter is is agreement. Consult placed. Case management notified.
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
## 2020-10-14 PROBLEM — L02.612 FOOT ABSCESS, LEFT: Status: RESOLVED | Noted: 2020-01-01 | Resolved: 2020-01-01

## 2020-10-14 PROBLEM — J96.91 RESPIRATORY FAILURE WITH HYPOXIA (HCC): Status: ACTIVE | Noted: 2020-01-01
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
## 2020-10-14 PROBLEM — N17.9 ACUTE KIDNEY INJURY (HCC): Status: RESOLVED | Noted: 2020-01-01 | Resolved: 2020-01-01

## 2020-10-14 PROBLEM — Z51.5 HOSPICE CARE: Status: ACTIVE | Noted: 2020-01-01
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
## 2020-10-14 PROBLEM — S82.001A CLOSED DISPLACED FRACTURE OF RIGHT PATELLA: Status: RESOLVED | Noted: 2018-09-02 | Resolved: 2020-01-01

## 2020-10-14 PROBLEM — L03.116 CELLULITIS OF LEFT FOOT: Status: RESOLVED | Noted: 2020-01-01 | Resolved: 2020-01-01

## 2020-10-14 PROBLEM — L03.90 CELLULITIS: Status: RESOLVED | Noted: 2020-01-01 | Resolved: 2020-01-01

## 2020-10-14 PROBLEM — R55 POSTURAL DIZZINESS WITH NEAR SYNCOPE: Status: RESOLVED | Noted: 2020-01-01 | Resolved: 2020-01-01

## 2020-10-14 PROBLEM — R42 POSTURAL DIZZINESS WITH NEAR SYNCOPE: Status: RESOLVED | Noted: 2020-01-01 | Resolved: 2020-01-01

## 2020-10-14 PROBLEM — R19.7 DIARRHEA: Status: RESOLVED | Noted: 2020-01-01 | Resolved: 2020-01-01

## 2020-10-14 NOTE — FLOWSHEET NOTE
Had prayer with patient. Patient become calm during prayer and looked at .  Pt was unable to respond will continue to keep patient and family is prayers

## 2020-10-14 NOTE — PROGRESS NOTES
Hospitalist Progress Note      Name:  Moshe Bernard /Age/Sex: 1931  (80 y.o. female)   MRN & CSN:  [id number] & [id number] Admission Date/Time: 10/12/2020  2:15 AM   Location:  [facility] PCP: No primary care provider on file. Hospital Day: 3    Assessment and Plan:   Moshe Bernard is a 80 y.o.  female  who presents with Pneumonia due to COVID-19 virus       · Acute hypoxic respiratory failure due to COVID-19 pneumonia  · Sepsis secondary to COVID 19 pneumonia  -CT chest with bilateral groundglass opacities  -Worsening O2 requirements, currently on Vapotherm  -Was started on Decadron, remdesivir  -On IV Vanco and cefepime  -ID on board  -All medications stopped now, to focus on comfort care    · Chronic medical condition  -Left heel ulcer, dressing, wound ostomy on board  -Morbid obesity     CODE STATUS, patient DNR CC, palliative care on board, daughter agreeable to hospice. Meeting with hospice at 3 PM today, spoke with daughter Penelope Montelongo, agrees to oral medications been stopped, and focusing on comfort care. Diet DIET CARDIAC;   DVT Prophylaxis [] Lovenox, []  Heparin, [] SCDs, []No VTE prophylaxis, patient ambulating   GI Prophylaxis [] PPI, [] H2 Blocker, [] No GI prophylaxis, patient is receiving diet/Tube Feeds   Code Status DNR-CC   Disposition Patient requires continued admission due to    MDM [] Low, [] Moderate,[]  High  Patient's risk as above due to      History of Present Illness:     Pt S&E. Lethargic, noncommunicative, moaning     10-14 point ROS reviewed negative, unless as noted above    Objective: Intake/Output Summary (Last 24 hours) at 10/14/2020 1518  Last data filed at 10/14/2020 0432  Gross per 24 hour   Intake 800 ml   Output 1750 ml   Net -950 ml      Vitals:   Vitals:    10/14/20 0851   BP: (!) 189/87   Pulse: 118   Resp: 30   Temp: 97.7 °F (36.5 °C)   SpO2: (!) 85%     Physical Exam:    GEN Lethargic, not responsive  EYES Pupils are equally round.   No scleral erythema, discharge, or conjunctivitis. HENT Mucous membranes are moist.   NECK No apparent thyromegaly or masses. RESP Clear to auscultation, no wheezes, rales or rhonchi. Symmetric chest movement while on room air. CARDIO/VASC S1/S2 auscultated. Regular rate without appreciable murmurs, rubs, or gallops. Peripheral pulses equal bilaterally and palpable. No peripheral edema. GI Abdomen is soft without significant tenderness, masses, or guarding. Bowel sounds are normoactive. Rectal exam deferred.  [place] catheter is  present. HEME/LYMPH No petechiae or ecchymoses. MSK No gross joint deformities. Spontaneous movement of all extremities. Both feet in PRAFO boots  SKIN Normal coloration, warm, dry. NEURO Cranial nerves appear grossly intact, no lateralizing weakness. Medications:   Medications:    Infusions:   PRN Meds: morphine, 2 mg, Q3H PRN  acetaminophen, 1,000 mg, Q6H PRN  sodium chloride flush, 10 mL, PRN  acetaminophen, 650 mg, Q6H PRN    Or  acetaminophen, 650 mg, Q6H PRN  LORazepam, 1 mg, Q6H PRN  sodium chloride, 30 mL, PRN        Data    Recent Labs     10/12/20  0228 10/13/20  0500 10/14/20  0622   WBC 15.4* 11.1* 16.2*   HGB 13.2 10.5* 12.1*   HCT 43.5 35.7* 41.8    212 255      Recent Labs     10/12/20  0228 10/12/20  1125 10/13/20  0500 10/14/20  0622     --  142 142   K 3.5  --  3.5 3.0*     --  105 103   CO2 27  --  25 25   PHOS  --   --  2.1* 1.7*   BUN 17 17 16 20   CREATININE 0.7 0.7 0.5* 0.5*     Recent Labs     10/12/20  1125 10/13/20  0500 10/14/20  0622   AST 67* 47*  47* 46*  46*   ALT 29 20  20 25  25   BILIDIR 0.6* 0.3 0.6*   BILITOT 1.2* 0.7  0.7 1.3*  1.3*   ALKPHOS 144* 112  112 128  128     Recent Labs     10/12/20  1125 10/13/20  0500 10/14/20  0622   INR 1.25 1.17 1.23     No results for input(s): CKTOTAL, CKMB, CKMBINDEX, TROPONINI in the last 72 hours.         Electronically signed by Michele Toussaint MD on 10/14/2020 at 3:18 PM

## 2020-10-14 NOTE — CONSULTS
[address] [place] [address] Consultation Note    Date: 10/14/2020  Name: Oniel Ruiz  MRN: [id number]  YOB: 1931   Patient's PCP: No primary care provider on file. Referring Physician: Dr. Silvia Flood  Acute care admit date: 10/12/2020    Informant: Chart reviewed, discussed with the hospice nurse liaison. I examined the patient who is unable to provide any information due to her clinical status    CC:  Respiratory distress, COVID pneumonitis    [place]: This is a 80year old nursing home resident with history of hypertension, osteoporosis, probable peripheral vascular disease with prior right toe amputations who has been at [facility] since July 2020. There is a questionable history of dementia per records. The patient was sent to the ED and admitted on 10/12/2020 because of shortness of breath, confusion and hypoxia with sats of 65%. The patient was positive for COVID-19 at the facility. The patient was admitted and has been seen by Pulmonary, ID and Palliative Medicine. Dr. Silvia Flood has talked with the patient's daughter, and there is a meeting with the hospice nurse liaison scheduled for late afternoon 10/14/20. The patient is on Vapotherm at 100% FiO2. The patient is mumbling, with coarse respirations. There is no conversation. The patient is DNR-comfort care. The patient was started on Dexamethasone and Remdesivir with broad spectrum antibiotics for possible secondary bacterial infection. The patient is not taking oral medications.     Past Medical History:   Diagnosis Date    Acid reflux     Anesthesia     \"Combative After Anesthesia\"    Arthritis     \"All Over My Body\"    Diverticulosis     HTN (hypertension)     Hx of blood clots 2000's    \"Behind Right Knee\"    Kidney stones     Passed Kidney Stones In 1970's    Lower back pain     \"Sometimes\"    Neuropathy     bilateral feet    Osteoporosis     IV Reclast Once A Year, Last Dose Received In 2014    by mouth daily    Historical Provider, MD   lactobacillus (CULTURELLE) capsule Take 1 capsule by mouth daily 6/11/20   NAYA Limon MD   hydrogen peroxide 3 % external solution Apply topically as needed. 6/10/20   Mau Handley MD   amLODIPine (NORVASC) 5 MG tablet Take 1 tablet by mouth daily 6/11/20   NAYA Limon MD   sennosides-docusate sodium (SENOKOT-S) 8.6-50 MG tablet Take 1 tablet by mouth daily 6/11/20   NAYA Limon MD   pregabalin (LYRICA) 150 MG capsule Take 1 capsule by mouth 2 times daily. 2/3/20 10/29/22  CHUCK Benoit CNP   acetaminophen (TYLENOL) 500 MG tablet Take 2 tablets by mouth every 6 hours as needed for Pain or Fever 8/24/16   Salazar Bustamante MD   pantoprazole (PROTONIX) 40 MG tablet Take 1 tablet by mouth every morning 8/24/16   Salazar Bustamante MD       ROS: As noted in [address], all other systems are unobtainable due to the patient's clinical condition    Weight:    Wt Readings from Last 3 Encounters:   10/13/20 211 lb 10.3 oz (96 kg)   07/15/20 219 lb (99.3 kg)   07/08/20 213 lb (96.6 kg)       Data reviewed 10/14/2020:  CTA Chest: 10/12/2020  1. Motion limited evaluation with no evidence of large or central pulmonary    embolus. 2. Patchy bilateral ground-glass opacities are consistent with the patient's    known COVID-19 pneumonia. 3. Cardiomegaly and coronary artery disease. Chest X-ray  10/12/20  Patchy bilateral airspace opacities, right greater than left.  Findings are    consistent with the patient's known COVID-19 pneumonia.         Recent Labs     10/12/20  0228 10/12/20  1125 10/13/20  0500 10/14/20  0622   AST 74* 67* 47*  47* 46*  46*   ALT 30 29 20  20 25  25   LIPASE 55  --   --   --    BILIDIR  --  0.6* 0.3 0.6*   BILITOT 1.1* 1.2* 0.7  0.7 1.3*  1.3*   ALKPHOS 159* 144* 112  112 128  128     Lab Results   Component Value Date    ALKPHOS 128 10/14/2020    ALKPHOS 128 10/14/2020    ALT 25 10/14/2020    ALT 25 10/14/2020    AST 46 10/14/2020    AST 46 10/14/2020    PROT 6.3 10/14/2020    PROT 6.3 10/14/2020    PROT 6.2 07/03/2020    BILITOT 1.3 10/14/2020    BILITOT 1.3 10/14/2020    BILIDIR 0.6 10/14/2020    IBILI 0.7 10/14/2020    LABALBU 3.9 10/14/2020    LABALBU 3.9 10/14/2020     CBC:   Recent Labs     10/12/20  0228 10/13/20  0500 10/14/20  0622   WBC 15.4* 11.1* 16.2*   HGB 13.2 10.5* 12.1*   HCT 43.5 35.7* 41.8   MCV 79.2 80.4 81.2    212 255     BMP:   Recent Labs     10/12/20  0228 10/12/20  1125 10/13/20  0500 10/14/20  0622     --  142 142   K 3.5  --  3.5 3.0*     --  105 103   CO2 27  --  25 25   BUN 17 17 16 20   CREATININE 0.7 0.7 0.5* 0.5*   GLUCOSE 192*  --  218* 202*       Physical Exam:   BP (!) 189/87   Pulse 118   Temp 97.7 °F (36.5 °C) (Axillary)   Resp 30   Ht 5' 5\" (1.651 m)   Wt 211 lb 10.3 oz (96 kg)   SpO2 (!) 85%   BMI 35.22 kg/m²   General: elderly restless female, mumble, no conversation,   HEENT: Mucous membranes are dry, sclerae are clear,    Neck: thick, JVP not visualized  Heart: distant tones, tachycardic RRR, S1S2, no murmurs  Lungs:  Coarse breath sounds bilaterally, diminished at the bases, with bibasilar rales, coarse rhonchi   Abdomen: obese, soft, bowel sounds quiet, no apparent tenderness, nondistended  : suggs  Extremities:  ++ lower extremity edema, chronic venous stasis changes of legs, bilateral heel ulcers and prior toe amputation on right, no mottling, no palpable cords,   Neurologic: obtunded    Assessment/Plan:  1. COVID-19 pneumonitis with hypoxic respiratory failure. The patient is currently receiving treatment with Vapotherm, Dexamethasone, Remdesivir and broad spectrum antibiotics. A meeting is being arranged with the patient's daughter to discuss goals of care. If family agreeable to de-escalate care and focus on comfort, she is likely eligible for General Inpatient Hospice. 2. Hyperglycemia without history of DM and may be due to steroids  3.  Probable peripheral vascular disease  4. 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    Varicose veins of leg with swelling I83.899    Venous stasis dermatitis of left lower extremity I87.2    Chronic venous hypertension I87.309    Lower leg edema R60.0    Lymphedema of both lower extremities I89.0    Left skew foot deformity M21.6X2    Frequent falls R29.6    Morbid obesity due to excess calories ([facility]) E66.01    Uncontrolled pain R52    Generalized weakness R53.1    Gait disturbance R26.9    Charcot's joint of left foot M14.672    Ischemic ulcer of left heel with fat layer exposed ([facility] [address] L97.422    Pyogenic inflammation of bone ([facility]) M86.9    Pseudomonas infection A49.8    Receiving intravenous antibiotic treatment as outpatient Z79.2    Open wound of left foot S91.302A    Pneumonia due to COVID-19 virus U07.1, J12.89    Respiratory failure with hypoxia ([facility]) X41.60       Certification of Terminal Illness: I certify that this patient is eligible for General Inpatient Hospice services for a terminal diagnosis of COVID-19 pneumonitis with hypoxic respiratory failure with a life expectancy predicted to be less than 6 months, if the illness follows its expected course.     Maria Elena Mcmahan MD, [facility]

## 2020-10-14 NOTE — PROGRESS NOTES
[address] [place] [address]      The hospice nurse liaison met with the patient's daughter, Jaun White, to discuss hospice and comfort care. Alexx Jay requests hospice inpatient care, and wants to stop Vapotherm, agreeable to use of cannula oxygen, and stopping antibiotics, Dexamethasone, Remdesivir and focus only on comfort. I will place comfort med orders, and ask respiratory therapist to stop Vapotherm after admission to [address] [place] [address] on the COVID-19 unit.      Toya Alaniz MD

## 2020-10-14 NOTE — DISCHARGE SUMMARY
Discharge Summary    Name:  Janis Flores /Age/Sex: 1931  (80 y.o. female)   MRN & CSN:  [id number] & [id number] Admission Date/Time: 10/12/2020  2:15 AM   Attending:  Jaynee Dakins, MD Discharging Physician: Jaynee Dakins, MD         Hospital Course:   Janis Flores is a 80 y.o.  female  who presents with Pneumonia due to COVID-19 virus, patient requiring increased o2 supplementation, she was made a [facility] by her Daughter with a focus on comfort care. She was seen by palliative and hospice care, all treatment stopped. She will by admitted to general inpatient  Hospice. · Acute hypoxic respiratory failure due to COVID-19 pneumonia  · Sepsis secondary to COVID 19 pneumonia  -CT chest with bilateral groundglass opacities  -Worsening O2 requirements, currently on Vapotherm  -Was started on Decadron, remdesivir  -On IV Vanco and cefepime  -ID on board  -All medications stopped now, to focus on comfort care     · Chronic medical condition  -Left heel ulcer, dressing, wound ostomy on board  -Morbid obesity     CODE STATUS, patient DNR CC, palliative care on board, daughter agreeable to hospice. Meeting with hospice at 3 PM 10/14/2020, spoke with daughter Oumar De Luna, agrees to oral medications been stopped, and focusing on comfort care. The patient expressed appropriate understanding of and agreement with the discharge recommendations, medications, and plan.      Consults this admission:  IP CONSULT TO HOSPITALIST  IP CONSULT TO INFECTIOUS DISEASES  IP CONSULT TO PULMONOLOGY  IP CONSULT TO PALLIATIVE CARE  IP CONSULT TO [address] [place] [address]    Discharge Instruction:   Follow up appointments: Hospice care     Diet:  regular diet   Activity: activity as tolerated  Disposition: Discharged to:   []Home, []HHC, []SNF, []Acute Rehab, [x]Hospice   Condition on discharge: Stable    Discharge Medications:      Trudell Leny \"Ami\"   Home Medication Instructions MRQ:[id number]    Printed on:10/14/20 1630   Medication Information acetaminophen (TYLENOL) 500 MG tablet  Take 2 tablets by mouth every 6 hours as needed for Pain or Fever             amLODIPine (NORVASC) 5 MG tablet  Take 1 tablet by mouth daily             furosemide (LASIX) 20 MG tablet  Take 20 mg by mouth daily             hydrochlorothiazide (HYDRODIURIL) 12.5 MG tablet  Take 12.5 mg by mouth daily             hydrogen peroxide 3 % external solution  Apply topically as needed. lactobacillus (CULTURELLE) capsule  Take 1 capsule by mouth daily             pantoprazole (PROTONIX) 40 MG tablet  Take 1 tablet by mouth every morning             piperacillin-tazobactam (ZOSYN) 3-0.375 GM per 50ML IVPB extended infusion  Infuse 3.375 g intravenously every 6 hours             pregabalin (LYRICA) 150 MG capsule  Take 1 capsule by mouth 2 times daily. sennosides-docusate sodium (SENOKOT-S) 8.6-50 MG tablet  Take 1 tablet by mouth daily               Objective Findings at Discharge:   BP (!) 189/87   Pulse 118   Temp 97.7 °F (36.5 °C) (Axillary)   Resp 30   Ht 5' 5\" (1.651 m)   Wt 211 lb 10.3 oz (96 kg)   SpO2 (!) 85%   BMI 35.22 kg/m²            PHYSICAL EXAM   GEN Lethargic, not responsive, moaning  EYES Pupils are equally round. No scleral erythema, discharge, or conjunctivitis. HENT Mucous membranes are moist.   NECK No apparent thyromegaly or masses. RESP Clear to auscultation, no wheezes, rales or rhonchi. Symmetric chest movement while on room air. CARDIO/VASC S1/S2 auscultated. Regular rate without appreciable murmurs, rubs, or gallops. Peripheral pulses equal bilaterally and palpable. No peripheral edema. GI Abdomen is soft without significant tenderness, masses, or guarding. Bowel sounds are normoactive. Rectal exam deferred.  [place] catheter is present. HEME/LYMPH No petechiae or ecchymoses. MSK No gross joint deformities. Spontaneous movement of all extremities. Both feet in PRAFO boots.   SKIN Normal coloration, warm, dry.  NEURO Cranial nerves appear grossly intact,, no lateralizing weakness. BMP/CBC  Recent Labs     10/12/20  0228 10/12/20  1125 10/13/20  0500 10/14/20  0622     --  142 142   K 3.5  --  3.5 3.0*     --  105 103   CO2 27  --  25 25   BUN 17 17 16 20   CREATININE 0.7 0.7 0.5* 0.5*   WBC 15.4*  --  11.1* 16.2*   HCT 43.5  --  35.7* 41.8     --  212 255       IMAGING:  Ct Head Wo Contrast    Result Date: 10/12/2020  EXAMINATION: CT OF THE HEAD WITHOUT CONTRAST  10/12/2020 4:13 am TECHNIQUE: CT of the head was performed without the administration of intravenous contrast. Dose modulation, iterative reconstruction, and/or weight based adjustment of the mA/kV was utilized to reduce the radiation dose to as low as reasonably achievable. COMPARISON: 05/27/2020 HISTORY: ORDERING SYSTEM PROVIDED HISTORY: AMS TECHNOLOGIST PROVIDED HISTORY: Has a \"code stroke\" or \"stroke alert\" been called? ->No Reason for exam:->AMS Reason for Exam: covid/pna FINDINGS: BRAIN/VENTRICLES: Evaluation is limited by patient motion, despite repeated attempts. There is no acute intracranial hemorrhage, mass effect or midline shift. No abnormal extra-axial fluid collection. No evidence of recent territorial infarct. There are nonspecific areas of hypoattenuation in the periventricular white matter and centrum semiovale that are likely related to chronic small vessel ischemic disease. The ventricles and cisternal spaces are prominent consistent with cerebral atrophy. There is no evidence of hydrocephalus. There is intracranial atherosclerosis. ORBITS: The visualized portion of the orbits demonstrate no acute abnormality. SINUSES: The visualized paranasal sinuses and mastoid air cells demonstrate no acute abnormality. SOFT TISSUES/SKULL:  No acute abnormality of the visualized skull or soft tissues. Motion limited evaluation with no overt evidence of acute intracranial abnormality.      Xr Chest Portable    Result Date: 10/12/2020  EXAMINATION: ONE XRAY VIEW OF THE CHEST 10/12/2020 2:34 am COMPARISON: 05/27/2020 HISTORY: ORDERING SYSTEM PROVIDED HISTORY: chest pain TECHNOLOGIST PROVIDED HISTORY: Reason for exam:->chest pain Reason for Exam: chest pain decreased o2 sats Acuity: Acute Type of Exam: Initial FINDINGS: There are patchy bilateral airspace opacities, right greater than left. No effusion or pneumothorax. The cardiac silhouette and mediastinal contours are stable. No acute bony abnormality. Patchy bilateral airspace opacities, right greater than left. Findings are consistent with the patient's known COVID-19 pneumonia. Cta Pulmonary W Contrast    Result Date: 10/12/2020  EXAMINATION: CTA OF THE CHEST 10/12/2020 4:14 am TECHNIQUE: CTA of the chest was performed after the administration of intravenous contrast.  Multiplanar reformatted images are provided for review. MIP images are provided for review. Dose modulation, iterative reconstruction, and/or weight based adjustment of the mA/kV was utilized to reduce the radiation dose to as low as reasonably achievable. COMPARISON: None. HISTORY: ORDERING SYSTEM PROVIDED HISTORY: sob TECHNOLOGIST PROVIDED HISTORY: Reason for exam:->sob Reason for Exam: ananda d-dimer FINDINGS: Pulmonary Arteries: The pulmonary arteries are adequately opacified for evaluation. Evaluation is significantly limited by respiratory motion. There is no evidence of large or central pulmonary embolus. The main pulmonary artery is normal in caliber. Mediastinum: The heart is enlarged. Normal caliber of the aorta. Moderate atherosclerotic plaque. Coronary artery calcifications are noted. No mediastinal adenopathy. Lungs/pleura: Patchy bilateral ground-glass opacities involving all lobes are consistent with the patient's known COVID-19 pneumonia. No effusion or pneumothorax. Evaluation for small pulmonary nodules is limited.  Upper Abdomen: Limited images of the upper abdomen are without acute findings. Soft Tissues/Bones: Within the limitations of respiratory motion, no acute bony findings are identified. 1. Motion limited evaluation with no evidence of large or central pulmonary embolus. 2. Patchy bilateral ground-glass opacities are consistent with the patient's known COVID-19 pneumonia. 3. Cardiomegaly and coronary artery disease.      Discharge Time of 28 minutes    Electronically signed by Jaynee Dakins, MD on 10/14/2020 at 4:30 PM

## 2020-10-14 NOTE — ED PROVIDER NOTES
I independently examined and evaluated Miguel Ángel Butler. HPI:  In brief, patient is an 80-year-old female that presents to the emergency department for evaluation. She presents from skilled nursing facility and is brought in via EMS. Report is initially provided by paramedics who state that they were dispatched to [facility] for a patient with hypoxia. Patient has recently tested positive for COVID-19. When EMS arrived, patient was saturating in the low 60s. She is DNR-CC and was initiated on non-rebreather therapy. Was subsequently transported to our emergency department for evaluation. Per nursing report from [facility], patient's shortness of breath has progressively gotten worse. She appears to be anxious and distressed. She does have orthopnea. No changes in diet or increased salt or water intake. Patient has been more confused as well. No falls, head trauma, neck trauma. No known history of chest pain or pleuritic pain. No known history of DVT, PE, recent surgery. Patient does have a dry, nonproductive cough. No hemoptysis. No known syncope, dizziness, headache, lightheadedness, numbness, tingling, weakness, paresthesias. No known precipitating, modifying, alleviating factors. Additional history is difficult to obtain taken to patient's mental status and the fact that she is a poor historian. ROS:  A complete 10 point review systems was unable to be performed due to patient's mental status. PMH/PSH/FH/SH/Allergies/Medications:  Reviewed as documented in PA notes and EMR.        Physical Exam:  Triage VS:    ED Triage Vitals   Enc Vitals Group      BP 10/12/20 0219 (!) 177/79      Pulse 10/12/20 0219 88      Resp 10/12/20 0219 13      Temp 10/12/20 0219 97.9 °F (36.6 °C)      Temp Source 10/12/20 0219 Oral      SpO2 10/12/20 0219 (!) 89 %      Weight 10/12/20 0215 219 lb (99.3 kg)      Height 10/12/20 0215 5' 5\" (1.651 m)     My pulse ox interpretation is - normal    General appearance: Patient is alert, oriented only to self. Not oriented to place or time. Patient appears to be an older, cachectic female. She is pulling at her IV and at her nonrebreather mask. No drooling, stridor, hoarseness, tripoding. Patient has comprehensible speech with 3-5 word conversational dyspnea. Skin:  Warm. Dry. No rashes, petechiae, purpura. Patient does have a left foot plantar ulcer. Eye:  Pupils are equal, round, reactive. No nystagmus. No gaze deviation. Funduscopic exam reveals no papilledema. Disc margins are clear. Head, ears, nose, mouth and throat:  Head is normocephalic, atraumatic. No external masses or lesions. No nasal drainage. Pharynx is clear, non-erythematous. Airway is patent. Mucous membranes are dry. Neck:  Supple. No nuchal rigidity. Trachea midline. No masses, thyromegaly or lymphadenopathy. No JVD. No carotid thrills or bruits. Extremity:  No clubbing, cyanosis. Minimal edema in bilateral lower extremities. No joint swelling. Normal muscle tone. Heart:  Regular rate and sinus rhythm. Audible S1 & S2. No audible murmurs, rubs, or gallops. Perfusion:  Symmetric peripheral pulses. Brisk capillary refill. Compartments are soft and compressible. No signs of compartment syndrome. Respiratory: Breath sounds diminished bilaterally. Patient does have rhonchi in bilateral lung fields. She does have some chest abdominal retractions with accessory muscle use. No signs of thoracic trauma. No flail segments. Abdominal:  Soft. Nontender. Non distended. Normal bowel sounds. No midline pulsatile abdominal masses, thrills or bruits. Back:  No CVA tenderness to palpation. No midline tenderness to palpation. Neurological:  Alert. No focal or lateralizing neurological deficits. Psychiatric:  Cooperative. MDM:  Patient was seen and evaluated in the emergency department by myself and ALFREDITO Liam Rodriguez.  A thorough history and physical exam were performed and prior medical records were reviewed. Appropriate PPE was donned and doffed. Upon arrival, patient's vital signs were noted. No tachycardia. She was bradycardia apneic at 13 respirations per minute. She was hypoxic with an O2 saturation of 89%. She was initiated on nonrebreather mask at 15 L/min. Blood pressure was hypertensive 177/79. Patient was afebrile. Patient was connected to continuous cardiopulmonary monitoring. Rhythm strip interpreted by myself demonstrating sinus rhythm. EKG was interpreted by myself, demonstrating ventricular rate of 84 bpm in sinus rhythm. There is motion artifact present. No signs of STEMI. Differential diagnoses and treatment plan were considered. Case is discussed with nursing staff at skilled nursing facility who does endorse that patient is DNR CC. Case is also discussed with daughter, Maciel Spence. Pertinent labs were drawn and radiographic studies were performed. Once results were available, they were reviewed by myself. Labs demonstrate leukocytosis with a white blood cell count of 15.4. No anemia or thrombocytopenia. Electrolytes are all within normal limits. No signs of kidney injury. Glucose was 192. Total bilirubin is 1.1. AST is 74. Troponin less than 0.010. proBNP 1464. Lipase is 55. COVID testing is positive. CT brain radiology report reads motion limited evaluation with no overt evidence of acute intracranial abnormality. Initially, chest x-ray is performed demonstrating patchy bilateral airspace opacities right greater than left. Findings consistent with patient's known COVID-19 pneumonia. Due to shortness of breath, CT angiography chest was performed. Radiology report reads motion limited evaluation with no evidence of large or central pulmonary embolus. Patchy bilateral groundglass opacities are consistent with patient's known COVID-19 pneumonia. Cardiomegaly and coronary artery disease.     On repeat evaluations, patient still appears to be in respiratory distress. Daughter states that the patient would not want intubation, chest compressions, CPR, cardioversion or defibrillation, IV pressors. Respiratory therapy was called back to bedside and does administer Vapotherm and high flow nasal cannula. Patient presents with difficulty in breathing. Symptoms concerning for hypoxic respiratory failure with bilateral groundglass opacities consistent with pneumonia. Vancomycin and cefepime are initiated. Due to elevated BNP, 30 cc/kg of normal saline is not provided. Blood cultures are drawn and are pending. Patient does have sirs criteria with leukocytosis and tachycardia which develops in the emergency department. She is also hypoxic. On repeat, focused sepsis exam is there are no signs of severe sepsis or septic shock. Given patient's symptoms and risk factors, we recommend admission to the hospital for further monitoring and treatment as necessary. Patient is agreeable. Case is discussed with admitting hospitalist who is agreed with treatment plan accepts admission. Patient is admitted in critical, guarded condition. Droplet precautions were maintained. Clinical Impressions:  1. Acute respiratory failure with hypoxia ([address]    2. Encephalopathy due to 2019 novel coronavirus    3. SIRS (systemic inflammatory response syndrome) (HCC)    4. Septicemia ([address]    5. Ground glass opacity present on imaging of lung    6. Pneumonia due to organism    7. COVID-19    8. Non-healing ulcer of left ankle, limited to breakdown of skin ([address]    9. Transaminitis    10. Accelerated hypertension        Critical Care Note:  Total critical care time provided today was 36 minutes. This excludes seperately billable procedures and family discussion time.  Critical care time provided for obtaining history, conducting a physical exam, performing and monitoring interventions, ordering, collecting and interpreting tests, and establishing medical

## 2020-10-14 NOTE — PROGRESS NOTES
pulmonary      SUBJECTIVE: remains on vapotherm     OBJECTIVE    VITALS:  BP (!) 183/78   Pulse 98   Temp 97.9 °F (36.6 °C) (Axillary)   Resp 20   Ht 5' 5\" (1.651 m)   Wt 211 lb 10.3 oz (96 kg)   SpO2 (!) 89%   BMI 35.22 kg/m²   HEAD AND FACE EXAM:  No throat injection, no active exudate,no thrush  NECK EXAM;No JVD, no masses, symmetrical  CHEST EXAM; Expansion equal and symmetrical, no masses  LUNG EXAM; Good breath sounds bilaterally. There are expiratory wheezes both lungs, there are crackles at both lung bases  CARDIOVASCULAR EXAM: Positive S1 and S2, no S3 or S4, no clicks ,no murmurs  RIGHT AND LEFT LOWER EXTRIMITY EXAM: No edema, no swelling, no inflamation            LABS   Lab Results   Component Value Date    WBC 16.2 (H) 10/14/2020    HGB 12.1 (L) 10/14/2020    HCT 41.8 10/14/2020    MCV 81.2 10/14/2020     10/14/2020     Lab Results   Component Value Date    CREATININE 0.5 (L) 10/13/2020    BUN 16 10/13/2020     10/13/2020    K 3.5 10/13/2020     10/13/2020    CO2 25 10/13/2020     Lab Results   Component Value Date    INR 1.23 10/14/2020    PROTIME 14.9 (H) 10/14/2020          Lab Results   Component Value Date    PHOS 2.1 10/13/2020    PHOS 3.9 11/13/2015      No results for input(s): PH, PO2ART, FXE1LQV, HCO3, BEART, O2SAT in the last 72 hours. Wt Readings from Last 3 Encounters:   10/13/20 211 lb 10.3 oz (96 kg)   07/15/20 219 lb (99.3 kg)   07/08/20 213 lb (96.6 kg)               ASSESMENT  Ac resp failure  covid pneumonia        PLAN  1. cpm  2. Comfort measures  3. Status DNRCC  4.  I will sign off.    10/14/2020  Mireya Blas M.D.

## 2020-10-14 NOTE — CARE COORDINATION
Received VM from [place] from [facility]. She stated that she has just met with patient's daughter and plan for inpatient hospice.

## 2020-10-14 NOTE — CARE COORDINATION
Received VM from Jourdan De La Torre at [facility] .  She stated that Hospice will meet with daughter today at 3pm.

## 2020-10-15 NOTE — PROGRESS NOTES
[address] [place] [address]  General Inpatient Hospice Progress Note    Date: 10/15/2020  Name: Valery Hoskins  MRN: [id number]  YOB: 1931   Patient's PCP: No primary care provider on file. Admit Date: 10/14/2020 to General Inpatient Hospice    Subjective: The patient is moaning, restless. She is off Vapotherm and on cannula oxygen, and hypoxic. There is mild upper airway noise. No family are here. Objective:   Pain is managed with Morphine IV prn with 4 doses in the past 24 hours, Glycopyrrolate IV is available for secretions, Haloperidol for delirium or nausea, and Lorazepam is available for anxiety. Data reviewed 10/15/2020:  CTA Chest: 10/12/2020  1. Motion limited evaluation with no evidence of large or central pulmonary    embolus. 2. Patchy bilateral ground-glass opacities are consistent with the patient's    known COVID-19 pneumonia.     3. Cardiomegaly and coronary artery disease.       Chest X-ray  10/12/20  Patchy bilateral airspace opacities, right greater than left.  Findings are    consistent with the patient's known COVID-19 pneumonia.                 Recent Labs     10/12/20  0228 10/12/20  1125 10/13/20  0500 10/14/20  0622   AST 74* 67* 47*  47* 46*  46*   ALT 30 29 20  20 25  25   LIPASE 55  --   --   --    BILIDIR  --  0.6* 0.3 0.6*   BILITOT 1.1* 1.2* 0.7  0.7 1.3*  1.3*   ALKPHOS 159* 144* 112  112 128  128            Lab Results   Component Value Date     ALKPHOS 128 10/14/2020     ALKPHOS 128 10/14/2020     ALT 25 10/14/2020     ALT 25 10/14/2020     AST 46 10/14/2020     AST 46 10/14/2020     PROT 6.3 10/14/2020     PROT 6.3 10/14/2020     PROT 6.2 07/03/2020     BILITOT 1.3 10/14/2020     BILITOT 1.3 10/14/2020     BILIDIR 0.6 10/14/2020     IBILI 0.7 10/14/2020     LABALBU 3.9 10/14/2020     LABALBU 3.9 10/14/2020      CBC:         Recent Labs     10/12/20  0228 10/13/20  0500 10/14/20  0622   WBC 15.4* 11.1* 16.2*   HGB 13.2 10.5* 12.1*   HCT 43.5 35.7* 41.8   MCV 79.2 80.4 81.2    212 255      BMP:          Recent Labs     10/12/20  0228 10/12/20  1125 10/13/20  0500 10/14/20  0622     --  142 142   K 3.5  --  3.5 3.0*     --  105 103   CO2 27  --  25 25   BUN 17 17 16 20   CREATININE 0.7 0.7 0.5* 0.5*   GLUCOSE 192*  --  218* 202*         Physical Exam:   BP (!) 189/87   Pulse 118   Temp 97.7 °F (36.5 °C) (Axillary)   Resp 30   Ht 5' 5\" (1.651 m)   Wt 211 lb 10.3 oz (96 kg)   SpO2 (!) 85%   BMI 35.22 kg/m²   General: restless, moaning,    HEENT: Mucous membranes are dry, sclerae are clear,    Heart: distant tones, tachycardic IRRR, S1S2, no murmurs  Lungs:  Coarse shallow breath sounds bilaterally, diminished at the bases, with bibasilar rales, coarse rhonchi   Abdomen: obese, soft, bowel sounds quiet, no apparent tenderness, nondistended  : suggs  Extremities:  ++ lower extremity edema, chronic venous stasis changes of legs, bilateral heel ulcers and 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 COVID-19 pneumonitis with hypoxic respiratory failure with goal of comfort care. Will add scheduled Morphine and Lorazepam, and continue prn. The patient is continued on [facility] for management of pain, dyspnea, congestion and end of life care. PPS 10%. Life expectancy is likely hours to days. 2. Hyperglycemia without history of DM, not monitoring given the above  3. Peripheral vascular disease  4.  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    Varicose veins of leg with swelling I83.899    Venous stasis dermatitis of left lower extremity I87.2    Chronic venous hypertension I87.309    Lower leg edema R60.0    Lymphedema of both lower extremities I89.0    Left skew foot deformity M21.6X2    Frequent falls R29.6    Morbid obesity due to excess calories ([address] E66.01    Uncontrolled pain R52    Generalized weakness R53.1    Gait disturbance R26.9    Charcot's joint of left foot M14.672    Ischemic ulcer of left heel with fat layer exposed ([address] L97.422    Pyogenic inflammation of bone (HCC) M86.9    Pseudomonas infection A49.8    Receiving intravenous antibiotic treatment as outpatient Z79.2    Open wound of left foot S91.302A    Pneumonia due to COVID-19 virus U07.1, J12.89    Respiratory failure with hypoxia ([address] J96.91    Hospice care Z51.5       BAIRON Casanova MD  10/15/2020

## 2020-10-15 NOTE — PLAN OF CARE
Problem: Gas Exchange - Impaired  Goal: Absence of hypoxia  Outcome: Ongoing     Problem: Gas Exchange - Impaired  Goal: Promote optimal lung function  Outcome: Ongoing     Problem: Breathing Pattern - Ineffective  Goal: Ability to achieve and maintain a regular respiratory rate  Outcome: Ongoing     Problem: Body Temperature -  Risk of, Imbalanced  Goal: Ability to maintain a body temperature within defined limits  Outcome: Ongoing     Problem:  Body Temperature -  Risk of, Imbalanced  Goal: Will regain or maintain usual level of consciousness  Outcome: Ongoing     Problem: Nutrition Deficits  Goal: Optimize nutrtional status  Outcome: Ongoing     Problem: Isolation Precautions - Risk of Spread of Infection  Goal: Prevent transmission of infection  Outcome: Ongoing     Problem: Falls - Risk of:  Goal: Will remain free from falls  Description: Will remain free from falls  Outcome: Ongoing

## 2020-10-15 NOTE — H&P
[address] [place] [address]    Date: 10/14/2020  Name: Lesly Marshall  MRN: [id number]  YOB: 1931   Patient's PCP: No primary care provider on file. Referring Physician: Dr. Joshua Moreno care admit date: 10/12/2020    Informant: Chart reviewed, discussed with the hospice nurse liaison and the patient's nurse. I examined the patient who is unable to provide any information due to her clinical status    CC:  Respiratory distress, COVID pneumonitis    [place]: This is a 80year old nursing home resident with history of hypertension, osteoporosis, probable peripheral vascular disease with prior right toe amputations who has been at [facility] since July 2020. There is a questionable history of dementia per records. The patient was sent to the ED and admitted on 10/12/2020 because of shortness of breath, confusion and hypoxia with sats of 65%. The patient was positive for COVID-19 at the facility. The patient was admitted and has been seen by Pulmonary, ID and Palliative Medicine. Dr. Lisset Moore has talked with the patient's daughter, and there is a meeting with the hospice nurse liaison scheduled for late afternoon 10/14/20. The patient is on Vapotherm at 100% FiO2. The patient is mumbling, with coarse respirations. There is no conversation. The patient is DNR-comfort care. The patient was started on Dexamethasone and Remdesivir with broad spectrum antibiotics for possible secondary bacterial infection. The patient is not taking oral medications or eating. The hospice nurse liaison met with the patient's daughter, Matheus Celaya, to discuss hospice and comfort care. Topher Rodríguez requests hospice inpatient care, and wants to stop Vapotherm, agreeable to use of cannula oxygen, and stopping antibiotics, Dexamethasone, Remdesivir and focus only on comfort.  I have placed comfort med orders, and ask respiratory therapist to stop Vapotherm after admission to General Inpatient Hospice on the [place] [address] unit.          Past Medical History:   Diagnosis Date    Acid reflux     Anesthesia     \"Combative After Anesthesia\"    Arthritis     \"All Over My Body\"    Diverticulosis     HTN (hypertension)     Hx of blood clots 2000's    \"Behind Right Knee\"    Kidney stones     Passed Kidney Stones In 1970's    Lower back pain     \"Sometimes\"    Neuropathy     bilateral feet    Osteoporosis     IV Reclast Once A Year, Last Dose Received In 2014    Purpura ([address]     \"Arms\"    Shingles     \"Twice, First Time  In 1972 Left Breast Area, Second Time In 2000 Right Lower Abdomen\"    Skin Cancer Excised Back Of Right Leg 2000's    Teeth missing     Upper And Lower    Urinary incontinence     Wears dentures     Full Upper    Wears glasses        Past Surgical History:   Procedure Laterality Date    APPENDECTOMY  1941    BLADDER SUSPENSION  1990's    BREAST CYST EXCISION Right 1955    Benign    COLONOSCOPY  Last Done In 2000's    Polpys Removed In Past    DENTAL SURGERY      Teeth Extracted In Past    DILATION AND CURETTAGE OF UTERUS      \"2 Or 3 \" Prior To DUANE In 1970's    FOOT DEBRIDEMENT Left 5/29/2020    FOOT ABSCESS DEBRIDEMENT INCISION AND DRAINAGE LEFT, EXCISIONAL BIOPSY OF THE LEFT FOOT MASS performed by Roxanna Dorantes MD at [address] [facility] Left 7-7-12    Broken Left Femur With Hardware    HYSTERECTOMY, TOTAL ABDOMINAL  1970's    JOINT REPLACEMENT Right 12-2-14    Total Right Knee    OTHER SURGICAL HISTORY  11/23/15    Robotic Assisted Laparoscopic Sacrocolpopexy    OTHER SURGICAL HISTORY Right 09/04/2018    orif right patella    OVARIAN CYST SURGERY Right 1957    \"Right\"    SKIN CANCER EXCISION  2000's    Skin Cancer Excised Back Of Right Leg    TOE AMPUTATION Right 1990's Or 2000's    Right Foot Little Toe    TOE AMPUTATION Right 5-23-13    Second Toe    TONSILLECTOMY  1962    TOTAL HIP ARTHROPLASTY Right 08/21/2016       Social History     Socioeconomic History    Marital status:       Spouse name: Not on file    Number of children: Not on file    Years of education: Not on file    Highest education level: Not on file   Occupational History    Not on file   Social Needs    Financial resource strain: Not on file    Food insecurity     Worry: Not on file     Inability: Not on file    Transportation needs     Medical: Not on file     Non-medical: Not on file   Tobacco Use    Smoking status: Never Smoker    Smokeless tobacco: Never Used   Substance and Sexual Activity    Alcohol use: No    Drug use: No    Sexual activity: Never   Lifestyle    Physical activity     Days per week: Not on file     Minutes per session: Not on file    Stress: Not on file   Relationships    Social connections     Talks on phone: Not on file     Gets together: Not on file     Attends Caodaism service: Not on file     Active member of club or organization: Not on file     Attends meetings of clubs or organizations: Not on file     Relationship status: Not on file    Intimate partner violence     Fear of current or ex partner: Not on file     Emotionally abused: Not on file     Physically abused: Not on file     Forced sexual activity: Not on file   Other Topics Concern    Not on file   Social History Narrative    Not on file       Family History   Problem Relation Age of Onset    Kidney Disease Mother     Heart Disease Mother         Heart Attack     Early Death Father 44        \"Muster Gas\"    Heart Disease Sister         Heart Attack    Early Death Brother 46    Diabetes Brother     Early Death Sister 2    Cancer Brother         Prostate Cancer    Kidney Disease Brother     Diabetes Brother     Diabetes Brother     Heart Disease Brother     Diabetes Son        Allergies   Allergen Reactions    Percocet [Oxycodone-Acetaminophen]      \"I Get Got Crazy And Mean\"    Phenergan [Promethazine Hcl]      \"Violent Behavior\"      Tape [Adhesive Gwinda Hu \"Redness And Tears The Skin , Paper Tape Is Ok To Use\"       Medications reviewed  Prior to Admission medications    Medication Sig Start Date End Date Taking? Authorizing Provider   furosemide (LASIX) 20 MG tablet Take 20 mg by mouth daily    Historical Provider, MD   piperacillin-tazobactam (ZOSYN) 3-0.375 GM per 50ML IVPB extended infusion Infuse 3.375 g intravenously every 6 hours    Historical Provider, MD   hydrochlorothiazide (HYDRODIURIL) 12.5 MG tablet Take 12.5 mg by mouth daily    Historical Provider, MD   lactobacillus (CULTURELLE) capsule Take 1 capsule by mouth daily 6/11/20   NAYA Urrutia MD   hydrogen peroxide 3 % external solution Apply topically as needed. 6/10/20   Ayanna Vera MD   amLODIPine (NORVASC) 5 MG tablet Take 1 tablet by mouth daily 6/11/20   NAYA Urrutia MD   sennosides-docusate sodium (SENOKOT-S) 8.6-50 MG tablet Take 1 tablet by mouth daily 6/11/20   NAYA Urrutia MD   pregabalin (LYRICA) 150 MG capsule Take 1 capsule by mouth 2 times daily. 2/3/20 10/29/22  CHUCK Duff - CNP   acetaminophen (TYLENOL) 500 MG tablet Take 2 tablets by mouth every 6 hours as needed for Pain or Fever 8/24/16   Bryan Amador MD   pantoprazole (PROTONIX) 40 MG tablet Take 1 tablet by mouth every morning 8/24/16   Bryan Amador MD       ROS: As noted in [address], all other systems are unobtainable due to the patient's clinical condition    Weight:    Wt Readings from Last 3 Encounters:   10/13/20 211 lb 10.3 oz (96 kg)   07/15/20 219 lb (99.3 kg)   07/08/20 213 lb (96.6 kg)       Data reviewed 10/14/2020:  CTA Chest: 10/12/2020  1. Motion limited evaluation with no evidence of large or central pulmonary    embolus. 2. Patchy bilateral ground-glass opacities are consistent with the patient's    known COVID-19 pneumonia. 3. Cardiomegaly and coronary artery disease.       Chest X-ray  10/12/20  Patchy bilateral airspace opacities, right greater than left.  Findings are consistent with the patient's known COVID-19 pneumonia.         Recent Labs     10/12/20  0228 10/12/20  1125 10/13/20  0500 10/14/20  0622   AST 74* 67* 47*  47* 46*  46*   ALT 30 29 20  20 25  25   LIPASE 55  --   --   --    BILIDIR  --  0.6* 0.3 0.6*   BILITOT 1.1* 1.2* 0.7  0.7 1.3*  1.3*   ALKPHOS 159* 144* 112  112 128  128     Lab Results   Component Value Date    ALKPHOS 128 10/14/2020    ALKPHOS 128 10/14/2020    ALT 25 10/14/2020    ALT 25 10/14/2020    AST 46 10/14/2020    AST 46 10/14/2020    PROT 6.3 10/14/2020    PROT 6.3 10/14/2020    PROT 6.2 07/03/2020    BILITOT 1.3 10/14/2020    BILITOT 1.3 10/14/2020    BILIDIR 0.6 10/14/2020    IBILI 0.7 10/14/2020    LABALBU 3.9 10/14/2020    LABALBU 3.9 10/14/2020     CBC:   Recent Labs     10/12/20  0228 10/13/20  0500 10/14/20  0622   WBC 15.4* 11.1* 16.2*   HGB 13.2 10.5* 12.1*   HCT 43.5 35.7* 41.8   MCV 79.2 80.4 81.2    212 255     BMP:   Recent Labs     10/12/20  0228 10/12/20  1125 10/13/20  0500 10/14/20  0622     --  142 142   K 3.5  --  3.5 3.0*     --  105 103   CO2 27  --  25 25   BUN 17 17 16 20   CREATININE 0.7 0.7 0.5* 0.5*   GLUCOSE 192*  --  218* 202*       Physical Exam:   BP (!) 189/87   Pulse 118   Temp 97.7 °F (36.5 °C) (Axillary)   Resp 30   Ht 5' 5\" (1.651 m)   Wt 211 lb 10.3 oz (96 kg)   SpO2 (!) 85%   BMI 35.22 kg/m²   General: elderly restless female, mumble, no conversation,   HEENT: Mucous membranes are dry, sclerae are clear,    Neck: thick, JVP not visualized  Heart: distant tones, tachycardic RRR, S1S2, no murmurs  Lungs:  Coarse breath sounds bilaterally, diminished at the bases, with bibasilar rales, coarse rhonchi   Abdomen: obese, soft, bowel sounds quiet, no apparent tenderness, nondistended  : suggs  Extremities:  ++ lower extremity edema, chronic venous stasis changes of legs, bilateral heel ulcers and prior toe amputation on right, no mottling, no palpable cords,   Neurologic: obtunded    Assessment/Plan:  1. COVID-19 pneumonitis with hypoxic respiratory failure. The patient's daughter requests comfort care due to her failure to improve and progressive debility and the acute respiratory failure. The patient is admitted to [facility] for management of pain, dyspnea, congestion and end of life care. PPS 10%. Life expectancy is likely hours to few days. 2. Hyperglycemia without history of DM and may be due to steroids  3. Probable peripheral vascular disease  4. 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    Varicose veins of leg with swelling I83.899    Venous stasis dermatitis of left lower extremity I87.2    Chronic venous hypertension I87.309    Lower leg edema R60.0    Lymphedema of both lower extremities I89.0    Left skew foot deformity M21.6X2    Frequent falls R29.6    Morbid obesity due to excess calories (HCC) E66.01    Uncontrolled pain R52    Generalized weakness R53.1    Gait disturbance R26.9    Charcot's joint of left foot M14.672    Ischemic ulcer of left heel with fat layer exposed ([address] L97.422    Pyogenic inflammation of bone (HCC) M86.9    Pseudomonas infection A49.8    Receiving intravenous antibiotic treatment as outpatient Z79.2    Open wound of left foot S91.302A    Pneumonia due to COVID-19 virus U07.1, J12.89    Respiratory failure with hypoxia ([address] J96.91    Hospice care M47.7       Certification of Terminal Illness: I certify that this patient is eligible for General Inpatient Hospice services for a terminal diagnosis of COVID-19 pneumonitis with hypoxic respiratory failure with a life expectancy predicted to be less than 6 months, if the illness follows its expected course.     Jonathon Gould MD, [facility]

## 2020-10-16 NOTE — PROGRESS NOTES
Upon checking on patient at [address] [facility] found patient without respirations and heartbeat. Confirmed no respirations and heart beat for 5 minutes with Flaquito Draper RN. Time of death confirmed at 200. Perfect Serve sent to Dr. Itz Valdez. Daughter Kaushik Hart notified. Donor referral notified at 26. Patient is not a candidate for donor referral Reference number [id number].  notified at 1252; states patient is not a coroners case. [address] notified at [phone].

## 2020-10-16 NOTE — PROGRESS NOTES
[facility]  General Inpatient Hospice Progress Note    Date: 10/16/2020  Name: Austyn Irvin  MRN: [id number]  YOB: 1931   Patient's PCP: No primary care provider on file. Admit Date: 10/14/2020 to General Inpatient Hospice    Subjective: The patient is minimally responsive, briefly opening her eyes, no speech and does not follow commands. She is mildly restless but better than yesterday. . She is off Vapotherm and on cannula oxygen, and remains hypoxic but not in significant respiratory distress. There is mild upper airway noise. No family are here. Objective:   Pain is managed with scheduled Morphine IV 4 mg every 6 hours plus prn (prn doses are difficult to assess for 10/15/20 with 5 charted doses between 1010 and 1017 - discussed with charge nurse), Glycopyrrolate IV is available for secretions, Haloperidol for delirium or nausea, and Lorazepam is available for anxiety with 1 dose. Data reviewed 10/16/2020:  CTA Chest: 10/12/2020  1. Motion limited evaluation with no evidence of large or central pulmonary    embolus. 2. Patchy bilateral ground-glass opacities are consistent with the patient's    known COVID-19 pneumonia.     3. Cardiomegaly and coronary artery disease.       Chest X-ray  10/12/20  Patchy bilateral airspace opacities, right greater than left.  Findings are    consistent with the patient's known COVID-19 pneumonia.                 Recent Labs     10/12/20  0228 10/12/20  1125 10/13/20  0500 10/14/20  0622   AST 74* 67* 47*  47* 46*  46*   ALT 30 29 20  20 25  25   LIPASE 55  --   --   --    BILIDIR  --  0.6* 0.3 0.6*   BILITOT 1.1* 1.2* 0.7  0.7 1.3*  1.3*   ALKPHOS 159* 144* 112  112 128  128            Lab Results   Component Value Date     ALKPHOS 128 10/14/2020     ALKPHOS 128 10/14/2020     ALT 25 10/14/2020     ALT 25 10/14/2020     AST 46 10/14/2020     AST 46 10/14/2020     PROT 6.3 10/14/2020     PROT 6.3 10/14/2020     PROT 6.2 07/03/2020   BILITOT 1.3 10/14/2020     BILITOT 1.3 10/14/2020     BILIDIR 0.6 10/14/2020     IBILI 0.7 10/14/2020     LABALBU 3.9 10/14/2020     LABALBU 3.9 10/14/2020      CBC:         Recent Labs     10/12/20  0228 10/13/20  0500 10/14/20  0622   WBC 15.4* 11.1* 16.2*   HGB 13.2 10.5* 12.1*   HCT 43.5 35.7* 41.8   MCV 79.2 80.4 81.2    212 255      BMP:          Recent Labs     10/12/20  0228 10/12/20  1125 10/13/20  0500 10/14/20  0622     --  142 142   K 3.5  --  3.5 3.0*     --  105 103   CO2 27  --  25 25   BUN 17 17 16 20   CREATININE 0.7 0.7 0.5* 0.5*   GLUCOSE 192*  --  218* 202*         Physical Exam:   BP (!) 189/87   Pulse 118   Temp 97.7 °F (36.5 °C) (Axillary)   Resp 30   Ht 5' 5\" (1.651 m)   Wt 211 lb 10.3 oz (96 kg)   SpO2 (!) 85%   BMI 35.22 kg/m²   General: minimally responsive, occasional soft moan, mild congestion,     Skin: sallow  HEENT: Mucous membranes are dry, sclerae are clear,    Heart: distant tones, tachycardic IRRR, S1S2, no murmurs  Lungs:  Coarse shallow breath sounds bilaterally, diminished at the bases, with bibasilar rales, coarse rhonchi   Abdomen: obese, soft, bowel sounds quiet, no apparent tenderness, nondistended  : suggs  Extremities:  ++ lower extremity edema, chronic venous stasis changes of legs, bilateral heel ulcers and prior toe amputation on right, toes are cool, no mottling, no palpable cords,   Neurologic: minimally responsive     Assessment/Plan:  1. COVID-19 pneumonitis with hypoxic respiratory failure with goal of comfort care. Continue scheduled Morphine and Lorazepam, and continue prn. The patient is continued on [facility] for management of pain, dyspnea, congestion and end of life care. PPS 10%. Life expectancy is likely hours to days. 2. Hyperglycemia without history of DM, not monitoring given the above  3. Peripheral vascular disease, with heel ulcer  4. DNR-comfort care  5.  Dr. Yu Reveles will be covering the General Inpatient Hospice patients for Dr Novoa Counts beginning Friday, October 16 at 1700 until Monday October 19 at 0800. Dr. Media Canavan can be reached through [facility]. 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    Varicose veins of leg with swelling I83.899    Venous stasis dermatitis of left lower extremity I87.2    Chronic venous hypertension I87.309    Lower leg edema R60.0    Lymphedema of both lower extremities I89.0    Left skew foot deformity M21.6X2    Frequent falls R29.6    Morbid obesity due to excess calories (HCC) E66.01    Uncontrolled pain R52    Generalized weakness R53.1    Gait disturbance R26.9    Charcot's joint of left foot M14.672    Ischemic ulcer of left heel with fat layer exposed ([address] L97.422    Pyogenic inflammation of bone (HCC) M86.9    Pseudomonas infection A49.8    Receiving intravenous antibiotic treatment as outpatient Z79.2    Open wound of left foot S91.302A    Pneumonia due to COVID-19 virus U07.1, J12.89    Respiratory failure with hypoxia ([address] J96.91    Hospice care Z51.5       JKatherine Wolff MD  10/16/2020

## 2020-10-17 LAB
CULTURE: NORMAL
Lab: NORMAL
SPECIMEN: NORMAL

## 2020-10-17 NOTE — DISCHARGE SUMMARY
care Z51.5       Brief History, reason for admission: Please see the acute care H+P, discharge summary, consultants notes, and my notes. The patient was admitted to [facility]. This is a 80year old nursing home resident with history of hypertension, osteoporosis, probable peripheral vascular disease with prior right toe amputations who has been at [facility] since July 2020. There is a questionable history of dementia per records. The patient was sent to the ED and admitted on 10/12/2020 because of shortness of breath, confusion and hypoxia with sats of 65%. The patient was positive for COVID-19 at the facility.        The patient was admitted and has been seen by Pulmonary, ID and Palliative Medicine. Dr. Mark Marshall has talked with the patient's daughter, and there is a meeting with the hospice nurse liaison scheduled for late afternoon 10/14/20.        The patient is on Vapotherm at 100% FiO2. The patient is mumbling, with coarse respirations. There is no conversation. The patient is DNR-comfort care. The patient was started on Dexamethasone and Remdesivir with broad spectrum antibiotics for possible secondary bacterial infection. The patient is not taking oral medications or eating.       The hospice nurse liaison met with the patient's daughter, Waldo Paige, to discuss hospice and comfort care. Jade Lutz requests hospice inpatient care, and wants to stop Vapotherm, agreeable to use of cannula oxygen, and stopping antibiotics, Dexamethasone, Remdesivir and focus only on comfort. I have placed comfort med orders, and ask respiratory therapist to stop Vapotherm after admission to General Inpatient [facility] Course: The patient was admitted to [facility] with the above, for complete details, please see the acute care History and Physical, progress notes, consultant notes and discharge summary.  The patient was treated with comfort medications, and symptoms were managed. Emotional and spiritual support was provided to the patient and family. The patient  as noted above.     Cause of death: COVID-19 pneumonitis with hypoxic respiratory failure    Significant Diagnostic Studies:  See computerized record in Guy Barnes MD, [facility]   10/17/2020

## 2020-10-21 LAB
EKG ATRIAL RATE: 84 BPM
EKG DIAGNOSIS: NORMAL
EKG P AXIS: 37 DEGREES
EKG P-R INTERVAL: 126 MS
EKG Q-T INTERVAL: 430 MS
EKG QRS DURATION: 78 MS
EKG QTC CALCULATION (BAZETT): 508 MS
EKG R AXIS: 47 DEGREES
EKG T AXIS: 63 DEGREES
EKG VENTRICULAR RATE: 84 BPM

## 2020-11-13 NOTE — PROGRESS NOTES
7/8/2020         Referring Physician: No ref. provider found  Primary Care Physician: Armin Green MD    Impression/Plan:   Diagnosis Orders   1. Chronic multifocal osteomyelitis of left foot ([address]     2. Pseudomonas infection     3. Charcot's joint of left foot     4. Receiving intravenous antibiotic treatment as outpatient         Discussion:  Pyogenic inflammation of bone ([address]  Slight improvement, will need to see surgeon, Complete antibiotics on 7/13      Return in about 2 weeks (around 7/22/2020). History: Benjamin Valenzuela is a 80 y.o.  female presenting today for follow-up. Has a hx  HTN, nephrolithiasin Seen in the hospital in June 2020 for a left foot infected plantar ulcer. S/p I and D on 5/29, wound cx positive for P aeruginosa and gamma streptococcus. Treated with Zosyn, planned duration of 6 weeks with an end-date of 7/13/2020 . Complains of frequent post-prandial watery stools. Denies abdominal pain, fever, chills. 7/8: No changed, diarrhea persists, but C diff was negative. Ulcer still present. Review of Systems   All other systems reviewed and are negative.       Allergies   Allergen Reactions    Percocet [Oxycodone-Acetaminophen]      \"I Get Got Crazy And Mean\"    Phenergan [Promethazine Hcl]      \"Violent Behavior\"      Tape [Adhesive Tape]      \"Redness And Tears The Skin , Paper Tape Is Ok To Use\"       Patient Active Problem List   Diagnosis    [facility] fracture of right hip ([facility]    HTN (hypertension)    Vaginal vault prolapse after hysterectomy    Varicose veins of leg with swelling    Venous stasis dermatitis of left lower extremity    Chronic venous hypertension    Lower leg edema    Blister of foot, left    Lymphedema of both lower extremities    Closed displaced fracture of right patella    Fall at home    Left skew foot deformity    Postural dizziness with near syncope    Fall at home, sequela    Acute kidney injury ([address]    Frequent falls    Morbid obesity due to excess calories ([address]    Fall at home, subsequent encounter    Fall (on)(from) incline, initial encounter    Cellulitis    Cellulitis of left foot    Uncontrolled pain    Generalized weakness    Gait disturbance    Charcot's joint of left foot    Ischemic ulcer of left heel with fat layer exposed ([address]    Pyogenic inflammation of bone (HCC)    Pseudomonas infection    Foot abscess, left    Receiving intravenous antibiotic treatment as outpatient    Diarrhea       Current Outpatient Medications   Medication Sig Dispense Refill    furosemide (LASIX) 20 MG tablet Take 20 mg by mouth daily      piperacillin-tazobactam (ZOSYN) 3-0.375 GM per 50ML IVPB extended infusion Infuse 3.375 g intravenously every 6 hours      hydrochlorothiazide (HYDRODIURIL) 12.5 MG tablet Take 12.5 mg by mouth daily      Probiotic Acidophilus (FLORANEX) TABS Take 1 tablet by mouth 3 times daily 90 tablet 0    lactobacillus (CULTURELLE) capsule Take 1 capsule by mouth daily      hydrogen peroxide 3 % external solution Apply topically as needed. 1 Bottle 0    amLODIPine (NORVASC) 5 MG tablet Take 1 tablet by mouth daily 30 tablet 0    sennosides-docusate sodium (SENOKOT-S) 8.6-50 MG tablet Take 1 tablet by mouth daily      pregabalin (LYRICA) 150 MG capsule Take 1 capsule by mouth 2 times daily. 60 capsule 0    acetaminophen (TYLENOL) 500 MG tablet Take 2 tablets by mouth every 6 hours as needed for Pain or Fever 120 tablet 3    pantoprazole (PROTONIX) 40 MG tablet Take 1 tablet by mouth every morning 30 tablet 3     No current facility-administered medications for this visit.         Past Medical History:   Diagnosis Date    Acid reflux     Anesthesia     \"Combative After Anesthesia\"    Arthritis     \"All Over My Body\"    Diverticulosis     HTN (hypertension)     Hx of blood clots 2000's    \"Behind Right Knee\"    Kidney stones     Passed Kidney Stones In 1970's    Lower back pain     \"Sometimes\"    Neuropathy     bilateral feet    Osteoporosis     IV Reclast Once A Year, Last Dose Received In 2014    Purpura ([address]     \"Arms\"    Shingles     \"Twice, First Time  In 1972 Left Breast Area, Second Time In 2000 Right Lower Abdomen\"    Skin Cancer Excised Back Of Right Leg 2000's    Teeth missing     Upper And Lower    Urinary incontinence     Wears dentures     Full Upper    Wears glasses        Past Surgical History:   Procedure Laterality Date    APPENDECTOMY  1941    BLADDER SUSPENSION  1990's    BREAST CYST EXCISION Right 1955    Benign    COLONOSCOPY  Last Done In 2000's    Polpys Removed In Past    DENTAL SURGERY      Teeth Extracted In Past    DILATION AND CURETTAGE OF UTERUS      \"2 Or 3 \" Prior To DUANE In 1970's    FOOT DEBRIDEMENT Left 5/29/2020    FOOT ABSCESS DEBRIDEMENT INCISION AND DRAINAGE LEFT, EXCISIONAL BIOPSY OF THE LEFT FOOT MASS performed by Laverne Sheth MD at [facility] Left 7-7-12    Broken Left Femur With Hardware    HYSTERECTOMY, TOTAL ABDOMINAL  1970's    JOINT REPLACEMENT Right 12-2-14    Total Right Knee    OTHER SURGICAL HISTORY  11/23/15    Robotic Assisted Laparoscopic Sacrocolpopexy    OTHER SURGICAL HISTORY Right 09/04/2018    orif right patella    OVARIAN CYST SURGERY Right 1957    \"Right\"    SKIN CANCER EXCISION  2000's    Skin Cancer Excised Back Of Right Leg    TOE AMPUTATION Right 1990's Or 2000's    Right Foot Little Toe    TOE AMPUTATION Right 5-23-13    Second Toe    TONSILLECTOMY  1962    TOTAL HIP ARTHROPLASTY Right 08/21/2016       Social History     Socioeconomic History    Marital status:       Spouse name: Not on file    Number of children: Not on file    Years of education: Not on file    Highest education level: Not on file   Occupational History    Not on file   Social Needs    Financial resource strain: Not on file    Food insecurity     Worry: Not on file     Inability: Not on file   Repairy needs Medical: Not on file     Non-medical: Not on file   Tobacco Use    Smoking status: Never Smoker    Smokeless tobacco: Never Used   Substance and Sexual Activity    Alcohol use: No    Drug use: No    Sexual activity: Never   Lifestyle    Physical activity     Days per week: Not on file     Minutes per session: Not on file    Stress: Not on file   Relationships    Social connections     Talks on phone: Not on file     Gets together: Not on file     Attends [employer] service: Not on file     Active member of club or organization: Not on file     Attends meetings of clubs or organizations: Not on file     Relationship status: Not on file    Intimate partner violence     Fear of current or ex partner: Not on file     Emotionally abused: Not on file     Physically abused: Not on file     Forced sexual activity: Not on file   Other Topics Concern    Not on file   Social History Narrative    Not on file       Family History   Problem Relation Age of Onset    Kidney Disease Mother     Heart Disease Mother         Heart Attack     Early Death Father 44        \"Muster Gas\"    Heart Disease Sister         Heart Attack    Early Death Brother 46    Diabetes Brother     Early Death Sister 2    Cancer Brother         Prostate Cancer    Kidney Disease Brother     Diabetes Brother     Diabetes Brother     Heart Disease Brother     Diabetes Son        Vital Signs:  Vitals:    07/08/20 0934   BP: (!) 137/59   Site: Left Upper Arm   Position: Sitting   Cuff Size: Medium Adult   Pulse: 72   Temp: 98 °F (36.7 °C)   TempSrc: Infrared        Wt Readings from Last 3 Encounters:   07/08/20 213 lb (96.6 kg)   06/24/20 213 lb (96.6 kg)   06/06/20 215 lb (97.5 kg)        Physical Exam:   Gen: alert and NAD  HEENT: sclera clear, pupils equal and reactive, extra ocular muscles intact, oropharynx clear, mucus membranes moist, tympanic membranes clear bilaterally, no cervical lymphadenopathy noted and neck supple  Neck: supple, no significant adenopathy  Chest: clear to auscultation, no wheezes, rales or rhonchi, symmetric air entry  Heart: regular rate and rhythm, no murmurs  ABD: abdomen is soft without significant tenderness, masses, organomegaly or guarding. EXT:peripheral pulses normal, no pedal edema, no clubbing or cyanosis  NEURO: alert, oriented, normal speech, no focal findings or movement disorder noted  Skin: well hydrated, no lesions, surgical site examined  Wounds: left foot plantar ulcer packed and surrounding hypertrophic tissue.    Labs:   WBC   Date Value Ref Range Status   07/03/2020 8.4 10^3/mL Final   05/31/2020 8.8 4.0 - 10.5 K/CU MM Final   05/27/2020 12.9 (H) 4.0 - 10.5 K/CU MM Final     CREATININE   Date Value Ref Range Status   07/03/2020 0.8  Final   06/09/2020 0.8 0.6 - 1.1 MG/DL Final   05/31/2020 0.8 0.6 - 1.1 MG/DL Final       Cultures:  Culture   Date Value Ref Range Status   06/08/2020 No growth at 18 to 36 hours  Final   05/27/2020 NO GROWTH AT 5 DAYS  Final   05/27/2020 PSEUDOMONAS AERUGINOSA HEAVY GROWTH (A)  Final   05/27/2020 (A)  Final    GAMMA STREPTOCOCCUS SP SCANTY GROWTH OPPORTUNISTIC PATHOGEN   05/27/2020 NO ANAEROBIC GROWTH AT 72 HOURS  Final   05/27/2020 (NOTE) PRINTED TO PHARMACY 5/30 5023 DD  Final   05/27/2020 NO GROWTH AT 5 DAYS  Final       Imaging Studies:   reviewed      Electronicallysigned by Ella Keen MD on 6/24/20 at 8:55 AM EDT Need for prophylactic measure
